# Patient Record
Sex: FEMALE | Race: OTHER | HISPANIC OR LATINO | URBAN - METROPOLITAN AREA
[De-identification: names, ages, dates, MRNs, and addresses within clinical notes are randomized per-mention and may not be internally consistent; named-entity substitution may affect disease eponyms.]

---

## 2017-01-15 ENCOUNTER — EMERGENCY (EMERGENCY)
Facility: HOSPITAL | Age: 37
LOS: 1 days | Discharge: HOME | End: 2017-01-15
Attending: EMERGENCY MEDICINE
Payer: MEDICAID

## 2017-01-15 VITALS
RESPIRATION RATE: 16 BRPM | SYSTOLIC BLOOD PRESSURE: 121 MMHG | DIASTOLIC BLOOD PRESSURE: 80 MMHG | TEMPERATURE: 98 F | OXYGEN SATURATION: 100 % | HEART RATE: 83 BPM

## 2017-01-15 VITALS
OXYGEN SATURATION: 97 % | WEIGHT: 139.99 LBS | SYSTOLIC BLOOD PRESSURE: 109 MMHG | RESPIRATION RATE: 18 BRPM | TEMPERATURE: 98 F | DIASTOLIC BLOOD PRESSURE: 69 MMHG | HEART RATE: 84 BPM | HEIGHT: 64 IN

## 2017-01-15 DIAGNOSIS — R42 DIZZINESS AND GIDDINESS: ICD-10-CM

## 2017-01-15 DIAGNOSIS — J45.909 UNSPECIFIED ASTHMA, UNCOMPLICATED: ICD-10-CM

## 2017-01-15 DIAGNOSIS — R51 HEADACHE: ICD-10-CM

## 2017-01-15 DIAGNOSIS — R10.13 EPIGASTRIC PAIN: ICD-10-CM

## 2017-01-15 DIAGNOSIS — I10 ESSENTIAL (PRIMARY) HYPERTENSION: ICD-10-CM

## 2017-01-15 DIAGNOSIS — Z88.0 ALLERGY STATUS TO PENICILLIN: ICD-10-CM

## 2017-01-15 DIAGNOSIS — R11.0 NAUSEA: ICD-10-CM

## 2017-01-15 LAB
ALBUMIN SERPL ELPH-MCNC: 4.4 G/DL — SIGNIFICANT CHANGE UP (ref 3.3–5.2)
ALP SERPL-CCNC: 48 U/L — SIGNIFICANT CHANGE UP (ref 40–120)
ALT FLD-CCNC: 22 U/L — SIGNIFICANT CHANGE UP
ANION GAP SERPL CALC-SCNC: 16 MMOL/L — SIGNIFICANT CHANGE UP (ref 5–17)
AST SERPL-CCNC: 25 U/L — SIGNIFICANT CHANGE UP
BASOPHILS # BLD AUTO: 0.1 K/UL — SIGNIFICANT CHANGE UP (ref 0–0.2)
BASOPHILS NFR BLD AUTO: 0.7 % — SIGNIFICANT CHANGE UP (ref 0–2)
BILIRUB SERPL-MCNC: 0.4 MG/DL — SIGNIFICANT CHANGE UP (ref 0.4–2)
BUN SERPL-MCNC: 10 MG/DL — SIGNIFICANT CHANGE UP (ref 8–20)
CALCIUM SERPL-MCNC: 9.6 MG/DL — SIGNIFICANT CHANGE UP (ref 8.6–10.2)
CHLORIDE SERPL-SCNC: 98 MMOL/L — SIGNIFICANT CHANGE UP (ref 98–107)
CO2 SERPL-SCNC: 26 MMOL/L — SIGNIFICANT CHANGE UP (ref 22–29)
CREAT SERPL-MCNC: 0.6 MG/DL — SIGNIFICANT CHANGE UP (ref 0.5–1.3)
EOSINOPHIL # BLD AUTO: 0.2 K/UL — SIGNIFICANT CHANGE UP (ref 0–0.5)
EOSINOPHIL NFR BLD AUTO: 1.9 % — SIGNIFICANT CHANGE UP (ref 0–6)
GLUCOSE SERPL-MCNC: 106 MG/DL — SIGNIFICANT CHANGE UP (ref 70–115)
HCT VFR BLD CALC: 41.6 % — SIGNIFICANT CHANGE UP (ref 37–47)
HGB BLD-MCNC: 14.8 G/DL — SIGNIFICANT CHANGE UP (ref 12–16)
LIDOCAIN IGE QN: 38 U/L — SIGNIFICANT CHANGE UP (ref 22–51)
LYMPHOCYTES # BLD AUTO: 2.9 K/UL — SIGNIFICANT CHANGE UP (ref 1–4.8)
LYMPHOCYTES # BLD AUTO: 32.1 % — SIGNIFICANT CHANGE UP (ref 20–55)
MCHC RBC-ENTMCNC: 31.4 PG — HIGH (ref 27–31)
MCHC RBC-ENTMCNC: 35.6 G/DL — SIGNIFICANT CHANGE UP (ref 32–36)
MCV RBC AUTO: 88.3 FL — SIGNIFICANT CHANGE UP (ref 81–99)
MONOCYTES # BLD AUTO: 0.5 K/UL — SIGNIFICANT CHANGE UP (ref 0–0.8)
MONOCYTES NFR BLD AUTO: 6.1 % — SIGNIFICANT CHANGE UP (ref 3–10)
NEUTROPHILS # BLD AUTO: 5.3 K/UL — SIGNIFICANT CHANGE UP (ref 1.8–8)
NEUTROPHILS NFR BLD AUTO: 59.1 % — SIGNIFICANT CHANGE UP (ref 37–73)
PLATELET # BLD AUTO: 282 K/UL — SIGNIFICANT CHANGE UP (ref 150–400)
POTASSIUM SERPL-MCNC: 3.6 MMOL/L — SIGNIFICANT CHANGE UP (ref 3.5–5.3)
POTASSIUM SERPL-SCNC: 3.6 MMOL/L — SIGNIFICANT CHANGE UP (ref 3.5–5.3)
PROT SERPL-MCNC: 8.3 G/DL — SIGNIFICANT CHANGE UP (ref 6.6–8.7)
RBC # BLD: 4.71 M/UL — SIGNIFICANT CHANGE UP (ref 4.4–5.2)
RBC # FLD: 12.2 % — SIGNIFICANT CHANGE UP (ref 11–15.6)
SODIUM SERPL-SCNC: 140 MMOL/L — SIGNIFICANT CHANGE UP (ref 135–145)
WBC # BLD: 8.9 K/UL — SIGNIFICANT CHANGE UP (ref 4.8–10.8)
WBC # FLD AUTO: 8.9 K/UL — SIGNIFICANT CHANGE UP (ref 4.8–10.8)

## 2017-01-15 PROCEDURE — 99284 EMERGENCY DEPT VISIT MOD MDM: CPT | Mod: 25

## 2017-01-15 PROCEDURE — 96374 THER/PROPH/DIAG INJ IV PUSH: CPT

## 2017-01-15 PROCEDURE — 85027 COMPLETE CBC AUTOMATED: CPT

## 2017-01-15 PROCEDURE — 83690 ASSAY OF LIPASE: CPT

## 2017-01-15 PROCEDURE — 84702 CHORIONIC GONADOTROPIN TEST: CPT

## 2017-01-15 PROCEDURE — 80053 COMPREHEN METABOLIC PANEL: CPT

## 2017-01-15 RX ORDER — IBUPROFEN 200 MG
600 TABLET ORAL ONCE
Qty: 0 | Refills: 0 | Status: COMPLETED | OUTPATIENT
Start: 2017-01-15 | End: 2017-01-15

## 2017-01-15 RX ORDER — ONDANSETRON 8 MG/1
4 TABLET, FILM COATED ORAL ONCE
Qty: 0 | Refills: 0 | Status: COMPLETED | OUTPATIENT
Start: 2017-01-15 | End: 2017-01-15

## 2017-01-15 RX ORDER — SODIUM CHLORIDE 9 MG/ML
1000 INJECTION INTRAMUSCULAR; INTRAVENOUS; SUBCUTANEOUS ONCE
Qty: 0 | Refills: 0 | Status: COMPLETED | OUTPATIENT
Start: 2017-01-15 | End: 2017-01-15

## 2017-01-15 RX ADMIN — Medication 600 MILLIGRAM(S): at 07:51

## 2017-01-15 RX ADMIN — SODIUM CHLORIDE 1000 MILLILITER(S): 9 INJECTION INTRAMUSCULAR; INTRAVENOUS; SUBCUTANEOUS at 07:49

## 2017-01-15 RX ADMIN — ONDANSETRON 4 MILLIGRAM(S): 8 TABLET, FILM COATED ORAL at 07:49

## 2017-01-15 RX ADMIN — Medication 600 MILLIGRAM(S): at 08:12

## 2017-01-15 NOTE — ED ADULT TRIAGE NOTE - CHIEF COMPLAINT QUOTE
pt states headache 3 hours pta , pt was at daughter bedside who is a patient while obtaining iv access on pt daughter pt states dizziness and nausea present pt assisted to stretcher

## 2017-01-15 NOTE — ED PROVIDER NOTE - OBJECTIVE STATEMENT
Pt is a 36yoF with h/o HTN, here in ED accompnaying 7yo daughter who likely has gastroenteritis.  She became lighthheaded, dizzy, and developed a generalized headache while watching the iv be placed in her daughter.  c/o some epigastric discomfort and persistent symptoms. No blurry vision/ numbness/tingling/chest pain/ SOB/ vomiting, or diarrhea. Denies foreign travel.

## 2017-01-15 NOTE — ED ADULT NURSE NOTE - OBJECTIVE STATEMENT
pt AOX3 started to feel sick while her daughter was being treated, c/o nausea, abdominal pain and headache. Denies any fevers, vomiting, diarrhea, constipation and blood in her stool. Pt has a Hx of HTN and asthma. Lungs clear bilaterally and normoactive bowel sounds. Blood sent to labs and medications given as per MD orders.

## 2017-01-15 NOTE — ED PROVIDER NOTE - MEDICAL DECISION MAKING DETAILS
Headache/ dizziness/lighteadedness while watching daughter's blood draw - developing epigastric pain/ nausea.  Ddx includes vasovagal reaction vs. early developing viral illness. Will check labs/ hydrate/ treat headache

## 2017-08-16 NOTE — ED ADULT NURSE NOTE - NS ED NURSE DC INFO COMPLEXITY
MD reviewed discharge instructions with the patient. The patient verbalized understanding. Patient removed her IV and took herself off the monitor. Patient was dressed when RN entered room. Patient is free of nausea and vomiting. Patient is in no apparent distress. Patient ambulated out of the emergency department without assistance.
Simple: Patient demonstrates quick and easy understanding/Verbalized Understanding

## 2017-09-06 ENCOUNTER — EMERGENCY (EMERGENCY)
Facility: HOSPITAL | Age: 37
LOS: 1 days | Discharge: HOME | End: 2017-09-06
Attending: EMERGENCY MEDICINE
Payer: MEDICAID

## 2017-09-06 VITALS
SYSTOLIC BLOOD PRESSURE: 128 MMHG | DIASTOLIC BLOOD PRESSURE: 86 MMHG | OXYGEN SATURATION: 100 % | HEART RATE: 63 BPM | HEIGHT: 65 IN | TEMPERATURE: 98 F | WEIGHT: 139.99 LBS | RESPIRATION RATE: 18 BRPM

## 2017-09-06 PROCEDURE — 99284 EMERGENCY DEPT VISIT MOD MDM: CPT

## 2017-09-06 PROCEDURE — 93005 ELECTROCARDIOGRAM TRACING: CPT

## 2017-09-06 PROCEDURE — 99283 EMERGENCY DEPT VISIT LOW MDM: CPT | Mod: 25

## 2017-09-06 PROCEDURE — 93010 ELECTROCARDIOGRAM REPORT: CPT

## 2017-09-06 RX ORDER — IBUPROFEN 200 MG
1 TABLET ORAL
Qty: 24 | Refills: 0
Start: 2017-09-06

## 2017-09-06 RX ORDER — FLUTICASONE PROPIONATE 50 MCG
1 SPRAY, SUSPENSION NASAL
Qty: 1 | Refills: 0
Start: 2017-09-06 | End: 2017-10-06

## 2017-09-06 NOTE — ED STATDOCS - MEDICAL DECISION MAKING DETAILS
Will check EKG and control pain. Sx consistent with URI. Non-tender sinuses, no pharyngeal erythema, lungs clear, no clinical OM. Likely URI, advised motrin, flonase, and dc. No fevers, well appearing, stable for dc

## 2017-09-06 NOTE — ED STATDOCS - OBJECTIVE STATEMENT
38 y/o F w/ PMHx of HTN presents to ED c/o b/l ear pain and headache x3 days. Associated sx include sore throat, and nasal congestion. Pt states she made an appointment to visit PMD but could not see him for another 2 weeks. She notes intermittent CP on the R side. Pt states she has not taken any medication for her current sx. Denies recent travel, sick contacts, difficulty breathing, SOB, cough, or fever. 36 y/o F w/ PMHx of HTN presents to ED c/o b/l ear pain and headache x3 days. Associated sx include sore throat, and nasal congestion. Pt states she made an appointment to visit PMD but could not see him for another 2 weeks. Pt states she has not taken any medication for her current sx. Denies recent travel, sick contacts, difficulty breathing, SOB, cough, or fever.

## 2017-10-31 ENCOUNTER — INPATIENT (INPATIENT)
Facility: HOSPITAL | Age: 37
LOS: 5 days | Discharge: HOME | DRG: 581 | End: 2017-11-06
Attending: HOSPITALIST
Payer: MEDICAID

## 2017-10-31 VITALS — HEIGHT: 65 IN | WEIGHT: 139.99 LBS

## 2017-10-31 DIAGNOSIS — R59.9 ENLARGED LYMPH NODES, UNSPECIFIED: ICD-10-CM

## 2017-10-31 DIAGNOSIS — R59.1 GENERALIZED ENLARGED LYMPH NODES: ICD-10-CM

## 2017-10-31 DIAGNOSIS — J45.909 UNSPECIFIED ASTHMA, UNCOMPLICATED: ICD-10-CM

## 2017-10-31 DIAGNOSIS — I10 ESSENTIAL (PRIMARY) HYPERTENSION: ICD-10-CM

## 2017-10-31 LAB
ALBUMIN SERPL ELPH-MCNC: 4.3 G/DL — SIGNIFICANT CHANGE UP (ref 3.3–5.2)
ALP SERPL-CCNC: 62 U/L — SIGNIFICANT CHANGE UP (ref 40–120)
ALT FLD-CCNC: 15 U/L — SIGNIFICANT CHANGE UP
ANION GAP SERPL CALC-SCNC: 14 MMOL/L — SIGNIFICANT CHANGE UP (ref 5–17)
APTT BLD: 28.2 SEC — SIGNIFICANT CHANGE UP (ref 27.5–37.4)
AST SERPL-CCNC: 17 U/L — SIGNIFICANT CHANGE UP
BASOPHILS # BLD AUTO: 0 K/UL — SIGNIFICANT CHANGE UP (ref 0–0.2)
BASOPHILS NFR BLD AUTO: 0.2 % — SIGNIFICANT CHANGE UP (ref 0–2)
BILIRUB SERPL-MCNC: 0.5 MG/DL — SIGNIFICANT CHANGE UP (ref 0.4–2)
BUN SERPL-MCNC: 9 MG/DL — SIGNIFICANT CHANGE UP (ref 8–20)
CALCIUM SERPL-MCNC: 8.9 MG/DL — SIGNIFICANT CHANGE UP (ref 8.6–10.2)
CHLORIDE SERPL-SCNC: 99 MMOL/L — SIGNIFICANT CHANGE UP (ref 98–107)
CO2 SERPL-SCNC: 26 MMOL/L — SIGNIFICANT CHANGE UP (ref 22–29)
CREAT SERPL-MCNC: 0.56 MG/DL — SIGNIFICANT CHANGE UP (ref 0.5–1.3)
EOSINOPHIL # BLD AUTO: 0.2 K/UL — SIGNIFICANT CHANGE UP (ref 0–0.5)
EOSINOPHIL NFR BLD AUTO: 1.3 % — SIGNIFICANT CHANGE UP (ref 0–6)
GLUCOSE SERPL-MCNC: 84 MG/DL — SIGNIFICANT CHANGE UP (ref 70–115)
HCG SERPL-ACNC: <2 MIU/ML — SIGNIFICANT CHANGE UP
HCT VFR BLD CALC: 38.3 % — SIGNIFICANT CHANGE UP (ref 37–47)
HGB BLD-MCNC: 13.6 G/DL — SIGNIFICANT CHANGE UP (ref 12–16)
INR BLD: 1.01 RATIO — SIGNIFICANT CHANGE UP (ref 0.88–1.16)
LYMPHOCYTES # BLD AUTO: 2.9 K/UL — SIGNIFICANT CHANGE UP (ref 1–4.8)
LYMPHOCYTES # BLD AUTO: 20 % — SIGNIFICANT CHANGE UP (ref 20–55)
MCHC RBC-ENTMCNC: 31.6 PG — HIGH (ref 27–31)
MCHC RBC-ENTMCNC: 35.5 G/DL — SIGNIFICANT CHANGE UP (ref 32–36)
MCV RBC AUTO: 89.1 FL — SIGNIFICANT CHANGE UP (ref 81–99)
MONOCYTES # BLD AUTO: 0.9 K/UL — HIGH (ref 0–0.8)
MONOCYTES NFR BLD AUTO: 6.1 % — SIGNIFICANT CHANGE UP (ref 3–10)
NEUTROPHILS # BLD AUTO: 10.3 K/UL — HIGH (ref 1.8–8)
NEUTROPHILS NFR BLD AUTO: 72.2 % — SIGNIFICANT CHANGE UP (ref 37–73)
PLATELET # BLD AUTO: 256 K/UL — SIGNIFICANT CHANGE UP (ref 150–400)
POTASSIUM SERPL-MCNC: 3.4 MMOL/L — LOW (ref 3.5–5.3)
POTASSIUM SERPL-SCNC: 3.4 MMOL/L — LOW (ref 3.5–5.3)
PROT SERPL-MCNC: 8 G/DL — SIGNIFICANT CHANGE UP (ref 6.6–8.7)
PROTHROM AB SERPL-ACNC: 11.1 SEC — SIGNIFICANT CHANGE UP (ref 9.8–12.7)
RBC # BLD: 4.3 M/UL — LOW (ref 4.4–5.2)
RBC # FLD: 13.9 % — SIGNIFICANT CHANGE UP (ref 11–15.6)
SODIUM SERPL-SCNC: 139 MMOL/L — SIGNIFICANT CHANGE UP (ref 135–145)
WBC # BLD: 14.3 K/UL — HIGH (ref 4.8–10.8)
WBC # FLD AUTO: 14.3 K/UL — HIGH (ref 4.8–10.8)

## 2017-10-31 PROCEDURE — 71260 CT THORAX DX C+: CPT | Mod: 26

## 2017-10-31 PROCEDURE — 99283 EMERGENCY DEPT VISIT LOW MDM: CPT

## 2017-10-31 RX ORDER — FLUTICASONE PROPIONATE 50 MCG
1 SPRAY, SUSPENSION NASAL
Qty: 0 | Refills: 0 | Status: DISCONTINUED | OUTPATIENT
Start: 2017-10-31 | End: 2017-11-06

## 2017-10-31 RX ORDER — POTASSIUM CHLORIDE 20 MEQ
40 PACKET (EA) ORAL ONCE
Qty: 0 | Refills: 0 | Status: COMPLETED | OUTPATIENT
Start: 2017-10-31 | End: 2017-10-31

## 2017-10-31 RX ORDER — LABETALOL HCL 100 MG
200 TABLET ORAL EVERY 12 HOURS
Qty: 0 | Refills: 0 | Status: DISCONTINUED | OUTPATIENT
Start: 2017-10-31 | End: 2017-11-01

## 2017-10-31 RX ORDER — ALBUTEROL 90 UG/1
1 AEROSOL, METERED ORAL EVERY 4 HOURS
Qty: 0 | Refills: 0 | Status: DISCONTINUED | OUTPATIENT
Start: 2017-10-31 | End: 2017-11-06

## 2017-10-31 RX ORDER — ACETAMINOPHEN 500 MG
650 TABLET ORAL EVERY 6 HOURS
Qty: 0 | Refills: 0 | Status: DISCONTINUED | OUTPATIENT
Start: 2017-10-31 | End: 2017-11-06

## 2017-10-31 RX ORDER — OXYCODONE AND ACETAMINOPHEN 5; 325 MG/1; MG/1
1 TABLET ORAL EVERY 6 HOURS
Qty: 0 | Refills: 0 | Status: DISCONTINUED | OUTPATIENT
Start: 2017-10-31 | End: 2017-11-06

## 2017-10-31 RX ORDER — KETOROLAC TROMETHAMINE 30 MG/ML
30 SYRINGE (ML) INJECTION ONCE
Qty: 0 | Refills: 0 | Status: DISCONTINUED | OUTPATIENT
Start: 2017-10-31 | End: 2017-10-31

## 2017-10-31 RX ORDER — LISINOPRIL 2.5 MG/1
20 TABLET ORAL DAILY
Qty: 0 | Refills: 0 | Status: DISCONTINUED | OUTPATIENT
Start: 2017-10-31 | End: 2017-11-01

## 2017-10-31 RX ORDER — MORPHINE SULFATE 50 MG/1
4 CAPSULE, EXTENDED RELEASE ORAL ONCE
Qty: 0 | Refills: 0 | Status: DISCONTINUED | OUTPATIENT
Start: 2017-10-31 | End: 2017-10-31

## 2017-10-31 RX ADMIN — Medication 650 MILLIGRAM(S): at 21:56

## 2017-10-31 RX ADMIN — LISINOPRIL 20 MILLIGRAM(S): 2.5 TABLET ORAL at 21:08

## 2017-10-31 RX ADMIN — Medication 650 MILLIGRAM(S): at 21:08

## 2017-10-31 RX ADMIN — MORPHINE SULFATE 4 MILLIGRAM(S): 50 CAPSULE, EXTENDED RELEASE ORAL at 15:50

## 2017-10-31 RX ADMIN — Medication 40 MILLIEQUIVALENT(S): at 20:49

## 2017-10-31 RX ADMIN — Medication 30 MILLIGRAM(S): at 17:32

## 2017-10-31 RX ADMIN — Medication 30 MILLIGRAM(S): at 17:31

## 2017-10-31 RX ADMIN — Medication 1 SPRAY(S): at 21:07

## 2017-10-31 RX ADMIN — Medication 200 MILLIGRAM(S): at 21:08

## 2017-10-31 RX ADMIN — MORPHINE SULFATE 4 MILLIGRAM(S): 50 CAPSULE, EXTENDED RELEASE ORAL at 16:30

## 2017-10-31 NOTE — H&P ADULT - RS GEN PE MLT RESP DETAILS PC
airway patent/no rales/good air movement/no wheezes/no rhonchi/breath sounds equal/respirations non-labored

## 2017-10-31 NOTE — ED ADULT NURSE REASSESSMENT NOTE - NS ED NURSE REASSESS COMMENT FT1
Called unit to give report. WEN Orozco not available for report at this time. Will await return phone call.
Called unit to give report. WEN Orozco not available. Will call unit again.   Patient remains A&Ox4, complaining of pain to right midaxillary & nausea. Medication for pain administered as ordered. All PM medications administered as ordered, well tolerated. Respirations even & unlabored, denies any numbness or tingling. Denies any chest pain, shortness of breath, or dizziness. IV ABX in progress, well tolerated. IV site C/D/I, patent, negative s/s phlebitis or infiltration. Will monitor.
Report received from off going RN, charting as noted. Patient A&Ox4, tearful, complaining of pain to right axillary secondary to abscess. Respirations even & unlabored, denies any numbness or tingling. Denies any chest pain, shortness of breath, nausea or dizziness. IV site C/D/I, patent, negative s/s phlebitis or infiltration. Plan of care discussed, all questions answered. Will monitor.

## 2017-10-31 NOTE — ED STATDOCS - LACERATION LENGTH DESCRIPTION FT
5x3 circular lesions that is hard, firm and tender to right axilla. No fluctuance, no erythema, and no warmth.

## 2017-10-31 NOTE — ED ADULT NURSE NOTE - OBJECTIVE STATEMENT
rt axillary abscess. started 3 months ago, but pain is worse + fever yesterday, denies any drainage.

## 2017-10-31 NOTE — ED STATDOCS - ATTENDING CONTRIBUTION TO CARE
I, Juan Luis Hamilton, performed the initial face to face bedside interview with this patient regarding history of present illness, review of symptoms and relevant past medical, social and family history.  I completed an independent physical examination.  I was the initial provider who evaluated this patient. I have signed out the follow up of any pending tests (i.e. labs, radiological studies) to the ACP.  I have communicated the patient’s plan of care and disposition with the ACP.  The history, relevant review of systems, past medical and surgical history, medical decision making, and physical examination was documented by the scribe in my presence and I attest to the accuracy of the documentation.

## 2017-10-31 NOTE — ED STATDOCS - OBJECTIVE STATEMENT
37 year old female presenting to the ED complaining of a bump to her right axilla x 3 months. Pt states that she did not feel pain to the area until yesterday. She states that her sx began as a small bump but began to grow in size. Pt states that she visited her PMD for her sx and was informed that she needed to have the abscess removed. She states that she did not follow up with her MD. Pt denies having any breast pain or discharge. She denies having any trauma to the area.  No further complaints at this time.  : Rosanna

## 2017-10-31 NOTE — H&P ADULT - HISTORY OF PRESENT ILLNESS
37 years old female with PMH of HTN presented to the ER for the evaluation of right axillary mass. Patient states the mass has been there for >6 months but for the past few days its has become pain full. Pain is constant, 10/10, non radiating and not associated with nausea, vomiting, SOB, chest pain .

## 2017-10-31 NOTE — H&P ADULT - PROBLEM SELECTOR PLAN 1
?etiology.  CT chest reviewed.  elevated wbc count.  started on Levaquin.  ID consult.  consider hematology consult if no improvement.

## 2017-10-31 NOTE — ED STATDOCS - PROGRESS NOTE DETAILS
Pt seen and evaluated. Agree with HPI/PE and plan. will follow CT and reeval Pt seen and evaluated. Agree with HPI/PE and plan. + large tender nodular axillar mass. No erythema or warmth to suggest abscess. Pt with + fh of breast CA. B/L breast small nontender nodules c/w possible fibrocystic breast vs CA.   Will follow CT and reeval Pt seen and evaluated. Agree with HPI/PE and plan. + large tender nodular axillar mass. Pt reports axillary nodule x 6 months, but develop and increase pain and swelling x 1 month. No fevers, but reports chills. No N/V.  No erythema or warmth to suggest abscess.   Will follow CT and reeval Results noted- concerning for possible lymphoma. results d/w pt and hospitalist called for admission

## 2017-11-01 DIAGNOSIS — J45.909 UNSPECIFIED ASTHMA, UNCOMPLICATED: ICD-10-CM

## 2017-11-01 DIAGNOSIS — R22.31 LOCALIZED SWELLING, MASS AND LUMP, RIGHT UPPER LIMB: ICD-10-CM

## 2017-11-01 DIAGNOSIS — R59.0 LOCALIZED ENLARGED LYMPH NODES: ICD-10-CM

## 2017-11-01 DIAGNOSIS — R50.9 FEVER, UNSPECIFIED: ICD-10-CM

## 2017-11-01 DIAGNOSIS — N63.10 UNSPECIFIED LUMP IN THE RIGHT BREAST, UNSPECIFIED QUADRANT: ICD-10-CM

## 2017-11-01 LAB
POTASSIUM SERPL-MCNC: 3.9 MMOL/L — SIGNIFICANT CHANGE UP (ref 3.5–5.3)
POTASSIUM SERPL-SCNC: 3.9 MMOL/L — SIGNIFICANT CHANGE UP (ref 3.5–5.3)

## 2017-11-01 PROCEDURE — 99222 1ST HOSP IP/OBS MODERATE 55: CPT

## 2017-11-01 PROCEDURE — 99233 SBSQ HOSP IP/OBS HIGH 50: CPT

## 2017-11-01 RX ORDER — SODIUM CHLORIDE 9 MG/ML
1000 INJECTION INTRAMUSCULAR; INTRAVENOUS; SUBCUTANEOUS
Qty: 0 | Refills: 0 | Status: DISCONTINUED | OUTPATIENT
Start: 2017-11-01 | End: 2017-11-03

## 2017-11-01 RX ORDER — SODIUM CHLORIDE 9 MG/ML
1000 INJECTION INTRAMUSCULAR; INTRAVENOUS; SUBCUTANEOUS ONCE
Qty: 0 | Refills: 0 | Status: COMPLETED | OUTPATIENT
Start: 2017-11-01 | End: 2017-11-01

## 2017-11-01 RX ORDER — AZITHROMYCIN 500 MG/1
500 TABLET, FILM COATED ORAL DAILY
Qty: 0 | Refills: 0 | Status: DISCONTINUED | OUTPATIENT
Start: 2017-11-01 | End: 2017-11-06

## 2017-11-01 RX ORDER — ONDANSETRON 8 MG/1
4 TABLET, FILM COATED ORAL EVERY 6 HOURS
Qty: 0 | Refills: 0 | Status: DISCONTINUED | OUTPATIENT
Start: 2017-11-01 | End: 2017-11-06

## 2017-11-01 RX ORDER — LABETALOL HCL 100 MG
100 TABLET ORAL EVERY 12 HOURS
Qty: 0 | Refills: 0 | Status: DISCONTINUED | OUTPATIENT
Start: 2017-11-01 | End: 2017-11-06

## 2017-11-01 RX ORDER — INFLUENZA VIRUS VACCINE 15; 15; 15; 15 UG/.5ML; UG/.5ML; UG/.5ML; UG/.5ML
0.5 SUSPENSION INTRAMUSCULAR ONCE
Qty: 0 | Refills: 0 | Status: COMPLETED | OUTPATIENT
Start: 2017-11-01 | End: 2017-11-01

## 2017-11-01 RX ORDER — SODIUM CHLORIDE 9 MG/ML
500 INJECTION INTRAMUSCULAR; INTRAVENOUS; SUBCUTANEOUS ONCE
Qty: 0 | Refills: 0 | Status: COMPLETED | OUTPATIENT
Start: 2017-11-01 | End: 2017-11-01

## 2017-11-01 RX ADMIN — OXYCODONE AND ACETAMINOPHEN 1 TABLET(S): 5; 325 TABLET ORAL at 20:50

## 2017-11-01 RX ADMIN — SODIUM CHLORIDE 1000 MILLILITER(S): 9 INJECTION INTRAMUSCULAR; INTRAVENOUS; SUBCUTANEOUS at 08:23

## 2017-11-01 RX ADMIN — SODIUM CHLORIDE 125 MILLILITER(S): 9 INJECTION INTRAMUSCULAR; INTRAVENOUS; SUBCUTANEOUS at 19:54

## 2017-11-01 RX ADMIN — SODIUM CHLORIDE 125 MILLILITER(S): 9 INJECTION INTRAMUSCULAR; INTRAVENOUS; SUBCUTANEOUS at 15:26

## 2017-11-01 RX ADMIN — Medication 1 SPRAY(S): at 17:40

## 2017-11-01 RX ADMIN — AZITHROMYCIN 500 MILLIGRAM(S): 500 TABLET, FILM COATED ORAL at 11:52

## 2017-11-01 RX ADMIN — OXYCODONE AND ACETAMINOPHEN 1 TABLET(S): 5; 325 TABLET ORAL at 06:14

## 2017-11-01 RX ADMIN — ONDANSETRON 4 MILLIGRAM(S): 8 TABLET, FILM COATED ORAL at 08:27

## 2017-11-01 RX ADMIN — OXYCODONE AND ACETAMINOPHEN 1 TABLET(S): 5; 325 TABLET ORAL at 14:00

## 2017-11-01 RX ADMIN — OXYCODONE AND ACETAMINOPHEN 1 TABLET(S): 5; 325 TABLET ORAL at 19:54

## 2017-11-01 RX ADMIN — ONDANSETRON 4 MILLIGRAM(S): 8 TABLET, FILM COATED ORAL at 15:26

## 2017-11-01 RX ADMIN — SODIUM CHLORIDE 2000 MILLILITER(S): 9 INJECTION INTRAMUSCULAR; INTRAVENOUS; SUBCUTANEOUS at 23:54

## 2017-11-01 RX ADMIN — OXYCODONE AND ACETAMINOPHEN 1 TABLET(S): 5; 325 TABLET ORAL at 06:51

## 2017-11-01 RX ADMIN — OXYCODONE AND ACETAMINOPHEN 1 TABLET(S): 5; 325 TABLET ORAL at 13:03

## 2017-11-01 RX ADMIN — Medication 1 SPRAY(S): at 06:14

## 2017-11-01 NOTE — CONSULT NOTE ADULT - ASSESSMENT
PAINFUL AXILLARY LYMPHADENOPATHY - NO EPID FOR BARTONELLA  NO TRAVEL AGAINST HISTO    C/W SIMILAR MASS IN BREAST  NODE APPEARS LARGER THAN STATED IN CT    SUGGEST BREAST SURGEON AND IR FOR BX AND CULURE/PATH    EMPIRIC AZITHRO PENDING BARTONELLA SEROLOGY

## 2017-11-01 NOTE — PROGRESS NOTE ADULT - SUBJECTIVE AND OBJECTIVE BOX
Patient is a 37y old  Female who presents with a chief complaint of axillary swelling and pain ,had pain for 1 week now, swelling noted few months ago , denies fever chills , discussed with ID Dr Gill       Allergies    penicillin (Rash)    Intolerances  ROS : pain and swelling under right arm, chest otherwise negative       REVIEW OF SYSTEMS:    Vital Signs Last 24 Hrs  T(C): 36.9 (01 Nov 2017 07:30), Max: 36.9 (31 Oct 2017 13:39)  T(F): 98.4 (01 Nov 2017 07:30), Max: 98.5 (31 Oct 2017 13:39)  HR: 60 (01 Nov 2017 11:18) (60 - 84)  BP: 90/58 (01 Nov 2017 11:18) (80/50 - 128/84)  BP(mean): 97 (31 Oct 2017 19:51) (97 - 97)  RR: 18 (01 Nov 2017 07:30) (16 - 19)  SpO2: 97% (01 Nov 2017 07:30) (97% - 100%)    PHYSICAL EXAM:    GENERAL: NAD, well-groomed, well-developed  NECK: Supple, No JVD, Normal thyroid  CHEST/LUNG: Clear to auscultation percussion bilaterally; No rales, rhonchi, wheezing, or rubs  HEART: Regular rate and rhythm; No murmurs, rubs, or gallops  ABDOMEN: Soft, Nontender, Nondistended; Bowel sounds present  EXTREMITIES:  2+ Peripheral Pulses, No clubbing, cyanosis, or edema  SKIN : right axillary /chest skin lump tender to touch       LABS:                        13.6   14.3  )-----------( 256      ( 31 Oct 2017 16:26 )             38.3     11-01    x   |  x   |  x   ----------------------------<  x   3.9   |  x   |  x     Ca    8.9      31 Oct 2017 16:26    TPro  8.0  /  Alb  4.3  /  TBili  0.5  /  DBili  x   /  AST  17  /  ALT  15  /  AlkPhos  62  10-31    PT/INR - ( 31 Oct 2017 16:26 )   PT: 11.1 sec;   INR: 1.01 ratio         PTT - ( 31 Oct 2017 16:26 )  PTT:28.2 sec  < from: CT Chest w/ IV Cont (10.31.17 @ 18:20) >  IMPRESSION:      The palpable abnormality in the superior lateral right breast at the   inferior margin of the right axilla appears to represent a solid nodule,   possibly a lymph node. In addition there is a mildly enlarged lymph node   in the superior aspect of the right axilla. Mild lymphadenopathy noted in   the visualized high retroperitoneum..    < end of copied text >      RADIOLOGY & ADDITIONAL TESTS:

## 2017-11-01 NOTE — PROGRESS NOTE ADULT - SUBJECTIVE AND OBJECTIVE BOX
The patient is a 37y old female who presents with a complaint of a painful worrisome axillary   nodule that is getting bigger.  She is scheduled to have a RIGHT AXILLARY NODE BIOPSY.  (31 Oct 2017 19:51)      PAST MEDICAL HISTORY:  Hypertension      PAST SURGICAL HISTORY:  Cesarian section x 2  Tummy tuck 3 years ago.      MEDICATIONS  (STANDING):  azithromycin   Tablet 500 milliGRAM(s) Oral daily  fluticasone propionate 50 MICROgram(s)/spray Nasal Spray 1 Spray(s) Both Nostrils two times a day  influenza   Vaccine 0.5 milliLiter(s) IntraMuscular once  labetalol 100 milliGRAM(s) Oral every 12 hours  sodium chloride 0.9%. 1000 milliLiter(s) (125 mL/Hr) IV Continuous <Continuous>    MEDICATIONS  (PRN):  acetaminophen   Tablet. 650 milliGRAM(s) Oral every 6 hours PRN Mild Pain (1 - 3)  ALBUTerol    90 MICROgram(s) HFA Inhaler 1 Puff(s) Inhalation every 4 hours PRN Shortness of Breath and/or Wheezing  ondansetron Injectable 4 milliGRAM(s) IV Push every 6 hours PRN Nausea  oxyCODONE    5 mG/acetaminophen 325 mG 1 Tablet(s) Oral every 6 hours PRN Moderate Pain (4 - 6)    Allergies:    penicillin (nausea & vomiting)    SOCIAL HISTORY:    The patient drinks wine on rare occasion.  She doesn't smoke or use illicit                               drugs.                      13.6   14.3  )-----------( 256      ( 31 Oct 2017 16:26 )             38.3       PT/INR - ( 31 Oct 2017 16:26 )   PT: 11.1 sec;   INR: 1.01 ratio         PTT - ( 31 Oct 2017 16:26 )  PTT:28.2 sec    11-01    x   |  x   |  x   ----------------------------<  x   3.9   |  x   |  x     Ca    8.9      31 Oct 2017 16:26    TPro  8.0  /  Alb  4.3  /  TBili  0.5  /  DBili  x   /  AST  17  /  ALT  15  /  AlkPhos  62  10-31    [id number]  Normal sinus rhythm  Normal ECG    CT CHEST  -  10/31/2017  FINDINGS:  Mildly enlarged lymph node in the superior aspect of the right exit   liver. At the superior margin of the lateral aspect of the right breast,   just inferior to the right axilla, there is a soft tissue nodule. This   abnormality measures 3.0 x 3.0 cm. This has the appearance of a solid   nodule. No evidence of a discernible wall or fluid center.  Mild lymphadenopathy is noted in the left para-aortic region of the upper   abdomen.  IMPRESSION:      The palpable abnormality in the superior lateral right breast at the   inferior margin of the right axilla appears to represent a solid nodule,   possibly a lymph node. In addition there is a mildly enlarged lymph node   in the superior aspect of the right axilla. Mild lymphadenopathy noted in   the visualized high retroperitoneum..    ASA # = 1 Mallampati # = 3   (Her teeth are intact)

## 2017-11-01 NOTE — CONSULT NOTE ADULT - SUBJECTIVE AND OBJECTIVE BOX
38yo F admitted to the ED last night with cc tender mass on the R maxilla. Patient refers that she first noted this mass 6 months ago and states that it has been increasing in size over time. She complains of chills and states to have had a fever on Sun that was not measured.  Mass became red/ecchymotic and more tender on Sat, now improved. She refers pain is 10/10. Denies drainage from the mass, trauma, or insect bites. States to have travelled recently for 3 [place]. Has a (-) breast CA family hx. One aunt with hx of ovarian CA. Not currently breastfeeding.  Denies n/v, SOB, CP, recent weight loss or loss of appetite. (-)OCP    PMHx: Asthma, HTN  All: PCN  PSHx: c/s, abd liposuction 4 yrs ago in   Meds: ibuprofen, fluticasone, labetalol, lisinopril    CT A/P 10/31: Superior lateral R breast in inferior margin of R axilla- solid nodule possibly LN. Mildly enlarged LN in superior aspect of R axilla. Mild lymphadenopathy in the RP.    TM: 98.4  HR:60-84  BP: 80/50 - 128/72  O2:    AAOx3, NAD  CTAx2  Tender palpable 3x5cm mass in the R axilla- nonfluctuant. No drainage. inflamed surrounding tissue  Breast exam- no palpable mass bilaterally, +fibrocystic changes, no nipple drainage, no skin changes noted  (-)cervical, supraclavicular, ingunal nodules palpated  S/ND/NT, no rebound or guarding    Labs:  WBC: 14.3 (segs: 72.2%)  H/H: 13.6/38.3  BUN/Cr: 9/0.56    A/P 38 yo F with palpable LN in R axilla and R superior of breast, most likely infected LN. Patient seen and evaluated with Dr. Rodriguez.  -Excisional biopsy of LN in R axilla  -Will follow   -rest as per PMD and ID
[facility] [place] VANESA ORO MD FACP   NANCY ACOSTA MD  Diplomates American Board of Internal Medicine and Infecctious Diseases      MRN-[id number]  JIMENA ARTHUR is a 37y  Female     CC:  5 MONTHS SOLLEN LN R AXILLA  NO C/O INCREASED PAIN AND FEVER  AFEBRILE ON ADMIT    DENIES ANIMAL EXPOSURE(CATS0 OR RECENT TRAVEL OR ANY PETS  NOTED NODE FOR MONTHS  NOW PAIN AND ? FEVER  NO WEIGHT LOSS  POS SWEATS    Past Medical & Surgical Hx:  PAST MEDICAL & SURGICAL HISTORY:  Asthma  High blood pressure   delivery delivered      Problem List:  HEALTH ISSUES - PROBLEM Dx:  Asthma: Asthma  HTN (hypertension): HTN (hypertension)  Lymph nodes enlarged: Lymph nodes enlarged            Allergies    penicillin (Rash)    Intolerances          ANTIBIOTICS:   azithromycin   Tablet 500 milliGRAM(s) Oral daily       Review of Systems: - Negative except as mentioned below  FEVER  PAINFUL R AXILLA    Physical Exam:    Vital Signs Last 24 Hrs  T(C): 36.9 (2017 07:30), Max: 36.9 (31 Oct 2017 13:39)  T(F): 98.4 (2017 07:30), Max: 98.5 (31 Oct 2017 13:39)  HR: 60 (2017 07:30) (60 - 84)  BP: 90/57 (2017 07:30) (80/50 - 128/84)  BP(mean): 97 (31 Oct 2017 19:51) (97 - 97)  RR: 18 (2017 07:30) (16 - 19)  SpO2: 97% (2017 07:30) (97% - 100%)    Height (cm): 167.64 (10-31 @ 16:27)  Weight (kg): 81.6 (10-31 @ 16:27)  BMI (kg/m2): 29 (10-31 @ 16:27)  BSA (m2): 1.91 (10-31 @ 16:27)    GEN: NAD, pleasant  HEENT: normocephalic and atraumatic. EOMI. JORDAN. Moist mucosa. Clear Posterior pharynx.  NECK: Supple. No carotid bruits.  No lymphadenopathy or thyromegaly.SHOTTY SMALL NODES  LUNGS: Clear to auscultation.  HEART: Regular rate and rhythm without murmur.  ABDOMEN: Soft, nontender, and nondistended.  Positive bowel sounds.  No hepatosplenomegaly was noted.  EXTREMITIES: Without any cyanosis, clubbing, rash, lesions or edema. LARGE VERY TENDER NON FLUCTUANT MASS R AXILLA. TOO PAINFUL TO PALPATE AND DETERMINE SIZE. SOFT  NO LN ELSEWHERE - L AXILLA, SIGNIF NECK OR INGUINAL  NEUROLOGIC: Cranial nerves II through XII are grossly intact.  MUSCULOSKELETAL:  SKIN: No ulceration or induration present.      Labs:                        13.6   14.3  )-----------( 256      ( 31 Oct 2017 16:26 )             38.3         x   |  x   |  x   ----------------------------<  x   3.9   |  x   |  x     Ca    8.9      31 Oct 2017 16:26    TPro  8.0  /  Alb  4.3  /  TBili  0.5  /  DBili  x   /  AST  17  /  ALT  15  /  AlkPhos  62  10-31    PT/INR - ( 31 Oct 2017 16:26 )   PT: 11.1 sec;   INR: 1.01 ratio         PTT - ( 31 Oct 2017 16:26 )  PTT:28.2 sec      Hemoglobin: 13.6 g/dL (10-31 @ 16:26)  Hematocrit: 38.3 % (10-31 @ 16:26)  WBC Count: 14.3 K/uL (10-31 @ 16:26)  Platelet Count - Automated: 256 K/uL (10-31 @ 16:26)    Potassium, Serum: 3.9 mmol/L ( @ 08:11)  Potassium, Serum: 3.4 mmol/L <L> (10-31 @ 16:26)  Blood Urea Nitrogen, Serum: 9.0 mg/dL (10-31 @ 16:26)  Sodium, Serum: 139 mmol/L (10-31 @ 16:26)          MICROBIOLOGY        RADIOLOGY  < from: CT Chest w/ IV Cont (10.31.17 @ 18:20) >  IMPRESSION:      The palpable abnormality in the superior lateral right breast at the   inferior margin of the right axilla appears to represent a solid nodule,   possibly a lymph node. In addition there is a mildly enlarged lymph node   in the superior aspect of the right axilla. Mild lymphadenopathy noted in   the visualized high retroperitoneum..          < end of copied text >              MADDIE ORO MD FACP

## 2017-11-02 ENCOUNTER — RESULT REVIEW (OUTPATIENT)
Age: 37
End: 2017-11-02

## 2017-11-02 LAB
ANION GAP SERPL CALC-SCNC: 8 MMOL/L — SIGNIFICANT CHANGE UP (ref 5–17)
BASOPHILS # BLD AUTO: 0 K/UL — SIGNIFICANT CHANGE UP (ref 0–0.2)
BASOPHILS NFR BLD AUTO: 0.2 % — SIGNIFICANT CHANGE UP (ref 0–2)
BUN SERPL-MCNC: 9 MG/DL — SIGNIFICANT CHANGE UP (ref 8–20)
CALCIUM SERPL-MCNC: 7.4 MG/DL — LOW (ref 8.6–10.2)
CHLORIDE SERPL-SCNC: 110 MMOL/L — HIGH (ref 98–107)
CO2 SERPL-SCNC: 22 MMOL/L — SIGNIFICANT CHANGE UP (ref 22–29)
CREAT SERPL-MCNC: 0.66 MG/DL — SIGNIFICANT CHANGE UP (ref 0.5–1.3)
EOSINOPHIL # BLD AUTO: 0.2 K/UL — SIGNIFICANT CHANGE UP (ref 0–0.5)
EOSINOPHIL NFR BLD AUTO: 1.8 % — SIGNIFICANT CHANGE UP (ref 0–6)
GLUCOSE SERPL-MCNC: 90 MG/DL — SIGNIFICANT CHANGE UP (ref 70–115)
GRAM STN FLD: SIGNIFICANT CHANGE UP
HCT VFR BLD CALC: 31.7 % — LOW (ref 37–47)
HGB BLD-MCNC: 10.8 G/DL — LOW (ref 12–16)
LYMPHOCYTES # BLD AUTO: 3.2 K/UL — SIGNIFICANT CHANGE UP (ref 1–4.8)
LYMPHOCYTES # BLD AUTO: 32.7 % — SIGNIFICANT CHANGE UP (ref 20–55)
MAGNESIUM SERPL-MCNC: 1.9 MG/DL — SIGNIFICANT CHANGE UP (ref 1.6–2.6)
MCHC RBC-ENTMCNC: 31.1 PG — HIGH (ref 27–31)
MCHC RBC-ENTMCNC: 34.1 G/DL — SIGNIFICANT CHANGE UP (ref 32–36)
MCV RBC AUTO: 91.4 FL — SIGNIFICANT CHANGE UP (ref 81–99)
MONOCYTES # BLD AUTO: 0.5 K/UL — SIGNIFICANT CHANGE UP (ref 0–0.8)
MONOCYTES NFR BLD AUTO: 5.4 % — SIGNIFICANT CHANGE UP (ref 3–10)
NEUTROPHILS # BLD AUTO: 5.7 K/UL — SIGNIFICANT CHANGE UP (ref 1.8–8)
NEUTROPHILS NFR BLD AUTO: 59.7 % — SIGNIFICANT CHANGE UP (ref 37–73)
PHOSPHATE SERPL-MCNC: 2.9 MG/DL — SIGNIFICANT CHANGE UP (ref 2.4–4.7)
PLATELET # BLD AUTO: 198 K/UL — SIGNIFICANT CHANGE UP (ref 150–400)
POTASSIUM SERPL-MCNC: 4.2 MMOL/L — SIGNIFICANT CHANGE UP (ref 3.5–5.3)
POTASSIUM SERPL-SCNC: 4.2 MMOL/L — SIGNIFICANT CHANGE UP (ref 3.5–5.3)
RBC # BLD: 3.47 M/UL — LOW (ref 4.4–5.2)
RBC # FLD: 14.6 % — SIGNIFICANT CHANGE UP (ref 11–15.6)
SODIUM SERPL-SCNC: 140 MMOL/L — SIGNIFICANT CHANGE UP (ref 135–145)
SPECIMEN SOURCE: SIGNIFICANT CHANGE UP
WBC # BLD: 9.6 K/UL — SIGNIFICANT CHANGE UP (ref 4.8–10.8)
WBC # FLD AUTO: 9.6 K/UL — SIGNIFICANT CHANGE UP (ref 4.8–10.8)

## 2017-11-02 PROCEDURE — 99232 SBSQ HOSP IP/OBS MODERATE 35: CPT

## 2017-11-02 PROCEDURE — 99233 SBSQ HOSP IP/OBS HIGH 50: CPT

## 2017-11-02 PROCEDURE — 88305 TISSUE EXAM BY PATHOLOGIST: CPT | Mod: 26

## 2017-11-02 RX ORDER — OXYCODONE HYDROCHLORIDE 5 MG/1
5 TABLET ORAL EVERY 4 HOURS
Qty: 0 | Refills: 0 | Status: DISCONTINUED | OUTPATIENT
Start: 2017-11-02 | End: 2017-11-06

## 2017-11-02 RX ORDER — MORPHINE SULFATE 50 MG/1
2 CAPSULE, EXTENDED RELEASE ORAL EVERY 6 HOURS
Qty: 0 | Refills: 0 | Status: DISCONTINUED | OUTPATIENT
Start: 2017-11-02 | End: 2017-11-06

## 2017-11-02 RX ADMIN — Medication 650 MILLIGRAM(S): at 06:03

## 2017-11-02 RX ADMIN — Medication 100 MILLIGRAM(S): at 17:10

## 2017-11-02 RX ADMIN — OXYCODONE AND ACETAMINOPHEN 1 TABLET(S): 5; 325 TABLET ORAL at 19:57

## 2017-11-02 RX ADMIN — OXYCODONE AND ACETAMINOPHEN 1 TABLET(S): 5; 325 TABLET ORAL at 20:27

## 2017-11-02 RX ADMIN — SODIUM CHLORIDE 125 MILLILITER(S): 9 INJECTION INTRAMUSCULAR; INTRAVENOUS; SUBCUTANEOUS at 23:24

## 2017-11-02 RX ADMIN — Medication 1 SPRAY(S): at 17:10

## 2017-11-02 RX ADMIN — SODIUM CHLORIDE 125 MILLILITER(S): 9 INJECTION INTRAMUSCULAR; INTRAVENOUS; SUBCUTANEOUS at 14:25

## 2017-11-02 RX ADMIN — AZITHROMYCIN 500 MILLIGRAM(S): 500 TABLET, FILM COATED ORAL at 14:03

## 2017-11-02 RX ADMIN — Medication 1 SPRAY(S): at 06:03

## 2017-11-02 RX ADMIN — Medication 650 MILLIGRAM(S): at 06:35

## 2017-11-02 NOTE — PROGRESS NOTE ADULT - SUBJECTIVE AND OBJECTIVE BOX
INTERVAL HPI/OVERNIGHT EVENTS: No acute events overnight    SUBJECTIVE: Mild R axillary pain. No fevers, chills. No n/v/d. Tolerating diet.      MEDICATIONS  (STANDING):  azithromycin   Tablet 500 milliGRAM(s) Oral daily  fluticasone propionate 50 MICROgram(s)/spray Nasal Spray 1 Spray(s) Both Nostrils two times a day  influenza   Vaccine 0.5 milliLiter(s) IntraMuscular once  labetalol 100 milliGRAM(s) Oral every 12 hours  sodium chloride 0.9%. 1000 milliLiter(s) (125 mL/Hr) IV Continuous <Continuous>    MEDICATIONS  (PRN):  acetaminophen   Tablet. 650 milliGRAM(s) Oral every 6 hours PRN Mild Pain (1 - 3)  ALBUTerol    90 MICROgram(s) HFA Inhaler 1 Puff(s) Inhalation every 4 hours PRN Shortness of Breath and/or Wheezing  morphine  - Injectable 2 milliGRAM(s) IV Push every 6 hours PRN Severe Pain (7 - 10)  ondansetron Injectable 4 milliGRAM(s) IV Push every 6 hours PRN Nausea  oxyCODONE    5 mG/acetaminophen 325 mG 1 Tablet(s) Oral every 6 hours PRN Moderate Pain (4 - 6)  oxyCODONE    IR 5 milliGRAM(s) Oral every 4 hours PRN Moderate Pain (4 - 6)      Vital Signs Last 24 Hrs  T(C): 36.8 (02 Nov 2017 07:15), Max: 37 (01 Nov 2017 15:20)  T(F): 98.2 (02 Nov 2017 07:15), Max: 98.6 (01 Nov 2017 15:20)  HR: 71 (02 Nov 2017 07:15) (71 - 80)  BP: 118/75 (02 Nov 2017 07:15) (86/53 - 118/75)  BP(mean): --  RR: 18 (02 Nov 2017 07:15) (18 - 19)  SpO2: 97% (02 Nov 2017 07:15) (97% - 98%)    NAD, AOx3  No respiratory distress  No peripheral edema, normal ROM    I&O's Detail    01 Nov 2017 07:01  -  02 Nov 2017 07:00  --------------------------------------------------------  IN:    Sodium Chloride 0.9% IV Bolus: 1000 mL    sodium chloride 0.9%.: 500 mL  Total IN: 1500 mL    OUT:  Total OUT: 0 mL    Total NET: 1500 mL      02 Nov 2017 07:01  -  02 Nov 2017 13:26  --------------------------------------------------------  IN:    sodium chloride 0.9%.: 625 mL  Total IN: 625 mL    OUT:  Total OUT: 0 mL    Total NET: 625 mL          LABS:                        10.8   9.6   )-----------( 198      ( 02 Nov 2017 07:48 )             31.7     11-02    140  |  110<H>  |  9.0  ----------------------------<  90  4.2   |  22.0  |  0.66    Ca    7.4<L>      02 Nov 2017 07:46  Phos  2.9     11-02  Mg     1.9     11-02    TPro  8.0  /  Alb  4.3  /  TBili  0.5  /  DBili  x   /  AST  17  /  ALT  15  /  AlkPhos  62  10-31    PT/INR - ( 31 Oct 2017 16:26 )   PT: 11.1 sec;   INR: 1.01 ratio         PTT - ( 31 Oct 2017 16:26 )  PTT:28.2 sec

## 2017-11-02 NOTE — PROGRESS NOTE ADULT - SUBJECTIVE AND OBJECTIVE BOX
ID FOLLOW UP      AFEBRILE  WC NORMAL    R AXILIARY MASS PAINFUL    PE    TENDER R AXILLARY MASS BUT FIRMNESS NOTES TO IT    FOR IR BX FOR DEFINITIVE DX    NEEDS CULTURE/AFB/FUNGUS  PAS STAIN FOR BARTONELLA  PATH TO R/O BREAST CANCER      MADDIE ORO MD

## 2017-11-02 NOTE — PROGRESS NOTE ADULT - SUBJECTIVE AND OBJECTIVE BOX
CC pain on right axillary , no fever or chills   discussed with IR ,  surgery and DR Gill   Allergies    penicillin (Rash)    Intolerances  ROS : pain and swelling under right arm, chest otherwise negative       REVIEW OF SYSTEMS:    Vital Signs Last 24 Hrs  T(C): 36.8 (02 Nov 2017 07:15), Max: 37 (01 Nov 2017 15:20)  T(F): 98.2 (02 Nov 2017 07:15), Max: 98.6 (01 Nov 2017 15:20)  HR: 71 (02 Nov 2017 07:15) (60 - 80)  BP: 118/75 (02 Nov 2017 07:15) (86/53 - 118/75)  BP(mean): --  RR: 18 (02 Nov 2017 07:15) (18 - 19)  SpO2: 97% (02 Nov 2017 07:15) (97% - 98%)  PHYSICAL EXAM:    GENERAL: NAD, well-groomed, well-developed  CHEST/LUNG: Clear to auscultation percussion bilaterally; No rales, rhonchi, wheezing, or rubs  HEART: Regular rate and rhythm; No murmurs, rubs, or gallops  ABDOMEN: Soft, Nontender, Nondistended; Bowel sounds present  EXTREMITIES:  2+ Peripheral Pulses, No clubbing, cyanosis, or edema  SKIN : right axillary /chest skin lump tender to touch       LABS:                                     10.8   9.6   )-----------( 198      ( 02 Nov 2017 07:48 )             31.7   Basic Metabolic Panel in AM (11.02.17 @ 07:46)    Potassium, Serum: 4.2 mmol/L

## 2017-11-02 NOTE — PROGRESS NOTE ADULT - PROBLEM SELECTOR PLAN 1
surgery input appreciated suggest IR consult for biopsy   I had spoke to IR and plan for needle aspirate /biopsy today   discussed with Dr Gill

## 2017-11-02 NOTE — PROGRESS NOTE ADULT - ATTENDING COMMENTS
Discussed with hospitalist after reviewing images. Large right axillary mass vs node which should be amenable to core biopsy. If core biopsy is indeterminate/nondiagnostic, will plan for operative approach.

## 2017-11-03 DIAGNOSIS — R51 HEADACHE: ICD-10-CM

## 2017-11-03 LAB
B HENSELAE IGG SER-ACNC: SIGNIFICANT CHANGE UP TITER
B HENSELAE IGM SER-ACNC: SIGNIFICANT CHANGE UP TITER
B QUINTANA IGG SERPL-ACNC: SIGNIFICANT CHANGE UP TITER
B QUINTANA IGM SER-ACNC: SIGNIFICANT CHANGE UP TITER
NIGHT BLUE STAIN TISS: SIGNIFICANT CHANGE UP
SPECIMEN SOURCE: SIGNIFICANT CHANGE UP

## 2017-11-03 PROCEDURE — 99232 SBSQ HOSP IP/OBS MODERATE 35: CPT

## 2017-11-03 PROCEDURE — 99233 SBSQ HOSP IP/OBS HIGH 50: CPT

## 2017-11-03 RX ORDER — ENOXAPARIN SODIUM 100 MG/ML
40 INJECTION SUBCUTANEOUS DAILY
Qty: 0 | Refills: 0 | Status: DISCONTINUED | OUTPATIENT
Start: 2017-11-03 | End: 2017-11-06

## 2017-11-03 RX ORDER — IBUPROFEN 200 MG
400 TABLET ORAL EVERY 8 HOURS
Qty: 0 | Refills: 0 | Status: DISCONTINUED | OUTPATIENT
Start: 2017-11-03 | End: 2017-11-06

## 2017-11-03 RX ORDER — SODIUM CHLORIDE 9 MG/ML
1000 INJECTION INTRAMUSCULAR; INTRAVENOUS; SUBCUTANEOUS
Qty: 0 | Refills: 0 | Status: DISCONTINUED | OUTPATIENT
Start: 2017-11-03 | End: 2017-11-06

## 2017-11-03 RX ADMIN — Medication 100 MILLIGRAM(S): at 17:00

## 2017-11-03 RX ADMIN — ENOXAPARIN SODIUM 40 MILLIGRAM(S): 100 INJECTION SUBCUTANEOUS at 11:00

## 2017-11-03 RX ADMIN — OXYCODONE AND ACETAMINOPHEN 1 TABLET(S): 5; 325 TABLET ORAL at 05:14

## 2017-11-03 RX ADMIN — Medication 1 SPRAY(S): at 17:00

## 2017-11-03 RX ADMIN — Medication 650 MILLIGRAM(S): at 13:40

## 2017-11-03 RX ADMIN — Medication 650 MILLIGRAM(S): at 12:53

## 2017-11-03 RX ADMIN — Medication 400 MILLIGRAM(S): at 21:21

## 2017-11-03 RX ADMIN — Medication 400 MILLIGRAM(S): at 22:00

## 2017-11-03 RX ADMIN — Medication 1 SPRAY(S): at 05:14

## 2017-11-03 RX ADMIN — SODIUM CHLORIDE 150 MILLILITER(S): 9 INJECTION INTRAMUSCULAR; INTRAVENOUS; SUBCUTANEOUS at 21:31

## 2017-11-03 RX ADMIN — AZITHROMYCIN 500 MILLIGRAM(S): 500 TABLET, FILM COATED ORAL at 11:00

## 2017-11-03 RX ADMIN — OXYCODONE AND ACETAMINOPHEN 1 TABLET(S): 5; 325 TABLET ORAL at 07:28

## 2017-11-03 NOTE — PROGRESS NOTE ADULT - SUBJECTIVE AND OBJECTIVE BOX
ID FOLLOW UP      AFEBRILE  WC NORMAL    R AXILIARY MASS PAINFUL    PE    TENDER R AXILLARY MASS BUT FIRMNESS NOTED     S/P IR BX FOR DEFINITIVE DX     CULTURE/AFB/FUNGUS P  PAS STAIN FOR BARTONELLA  PATH TO R/O BREAST CANCER    AFEBRILE  BARTONELLA SEROLOGY PENDING      MADDIE ORO MD

## 2017-11-03 NOTE — PROGRESS NOTE ADULT - SUBJECTIVE AND OBJECTIVE BOX
SUBJECTIVE: Pain to R axilla. No fevers, chills. No nausea, vomiting. Tolerating diet. Ambulating. HA.       MEDICATIONS  (STANDING):  azithromycin   Tablet 500 milliGRAM(s) Oral daily  enoxaparin Injectable 40 milliGRAM(s) SubCutaneous daily  fluticasone propionate 50 MICROgram(s)/spray Nasal Spray 1 Spray(s) Both Nostrils two times a day  influenza   Vaccine 0.5 milliLiter(s) IntraMuscular once  labetalol 100 milliGRAM(s) Oral every 12 hours  sodium chloride 0.9%. 1000 milliLiter(s) (125 mL/Hr) IV Continuous <Continuous>    MEDICATIONS  (PRN):  acetaminophen   Tablet. 650 milliGRAM(s) Oral every 6 hours PRN Mild Pain (1 - 3)  ALBUTerol    90 MICROgram(s) HFA Inhaler 1 Puff(s) Inhalation every 4 hours PRN Shortness of Breath and/or Wheezing  ibuprofen  Tablet 400 milliGRAM(s) Oral every 8 hours PRN headache  morphine  - Injectable 2 milliGRAM(s) IV Push every 6 hours PRN Severe Pain (7 - 10)  ondansetron Injectable 4 milliGRAM(s) IV Push every 6 hours PRN Nausea  oxyCODONE    5 mG/acetaminophen 325 mG 1 Tablet(s) Oral every 6 hours PRN Moderate Pain (4 - 6)  oxyCODONE    IR 5 milliGRAM(s) Oral every 4 hours PRN Moderate Pain (4 - 6)      Vital Signs Last 24 Hrs  T(C): 37.2 (02 Nov 2017 23:25), Max: 37.4 (02 Nov 2017 17:12)  T(F): 99 (02 Nov 2017 23:25), Max: 99.3 (02 Nov 2017 17:12)  HR: 88 (02 Nov 2017 23:25) (68 - 88)  BP: 96/62 (02 Nov 2017 23:30) (96/62 - 125/82)  BP(mean): --  RR: 16 (02 Nov 2017 23:25) (16 - 18)  SpO2: 96% (02 Nov 2017 23:25) (96% - 100%)    NAD, AOx3, uncomfortable  PERRL, EOMI  No respiratory distress  No peripheral edema, normal ROM    I&O's Detail    02 Nov 2017 07:01  -  03 Nov 2017 07:00  --------------------------------------------------------  IN:    sodium chloride 0.9%.: 1625 mL  Total IN: 1625 mL    OUT:  Total OUT: 0 mL    Total NET: 1625 mL      03 Nov 2017 07:01  -  03 Nov 2017 09:49  --------------------------------------------------------  IN:    sodium chloride 0.9%.: 375 mL  Total IN: 375 mL    OUT:  Total OUT: 0 mL    Total NET: 375 mL          LABS:                        10.8   9.6   )-----------( 198      ( 02 Nov 2017 07:48 )             31.7     11-02    140  |  110<H>  |  9.0  ----------------------------<  90  4.2   |  22.0  |  0.66    Ca    7.4<L>      02 Nov 2017 07:46  Phos  2.9     11-02  Mg     1.9     11-02

## 2017-11-03 NOTE — PROGRESS NOTE ADULT - SUBJECTIVE AND OBJECTIVE BOX
Pt is seen examined c/o right sided trobbing headache since yesterday , pain on the axillary area intermittent   s/p IR core neeedle biopsy yesterday , all input appreciated       ROS :as above otherwise negative ]      REVIEW OF SYSTEMS:    Vital Signs Last 24 Hrs  T(C): 37.2 (02 Nov 2017 23:25), Max: 37.4 (02 Nov 2017 17:12)  T(F): 99 (02 Nov 2017 23:25), Max: 99.3 (02 Nov 2017 17:12)  HR: 88 (02 Nov 2017 23:25) (68 - 88)  BP: 96/62 (02 Nov 2017 23:30) (96/62 - 125/82)  BP(mean): --  RR: 16 (02 Nov 2017 23:25) (16 - 18)  SpO2: 96% (02 Nov 2017 23:25) (96% - 100%)  PHYSICAL EXAM:    GENERAL: NAD, well-groomed, well-developed  CHEST/LUNG: Clear to auscultation  bilaterally; No rales  HEART: Regular rate and rhythm; No murmurs, rubs, or gallops  ABDOMEN: Soft, Nontender, Nondistended; Bowel sounds present  EXTREMITIES:  2+ Peripheral Pulses, No clubbing, cyanosis, or edema  SKIN : right axillary /chest skin lump tender to touch       LABS:                                 11-02    140  |  110<H>  |  9.0  ----------------------------<  90  4.2   |  22.0  |  0.66    Ca    7.4<L>      02 Nov 2017 07:46  Phos  2.9     11-02  Mg     1.9     11-02                          10.8   9.6   )-----------( 198      ( 02 Nov 2017 07:48 )             31.7

## 2017-11-04 LAB
H CAPSUL MYC AB FLD-ACNC: SIGNIFICANT CHANGE UP
H CAPSUL YST AB FLD-ACNC: SIGNIFICANT CHANGE UP

## 2017-11-04 PROCEDURE — 99233 SBSQ HOSP IP/OBS HIGH 50: CPT

## 2017-11-04 PROCEDURE — 99232 SBSQ HOSP IP/OBS MODERATE 35: CPT

## 2017-11-04 RX ADMIN — Medication 100 MILLIGRAM(S): at 05:16

## 2017-11-04 RX ADMIN — SODIUM CHLORIDE 150 MILLILITER(S): 9 INJECTION INTRAMUSCULAR; INTRAVENOUS; SUBCUTANEOUS at 05:15

## 2017-11-04 RX ADMIN — Medication 1 SPRAY(S): at 05:16

## 2017-11-04 RX ADMIN — Medication 100 MILLIGRAM(S): at 17:00

## 2017-11-04 RX ADMIN — Medication 400 MILLIGRAM(S): at 05:16

## 2017-11-04 RX ADMIN — Medication 400 MILLIGRAM(S): at 06:57

## 2017-11-04 RX ADMIN — Medication 1 SPRAY(S): at 17:00

## 2017-11-04 RX ADMIN — ENOXAPARIN SODIUM 40 MILLIGRAM(S): 100 INJECTION SUBCUTANEOUS at 11:00

## 2017-11-04 RX ADMIN — AZITHROMYCIN 500 MILLIGRAM(S): 500 TABLET, FILM COATED ORAL at 11:00

## 2017-11-04 NOTE — PROGRESS NOTE ADULT - SUBJECTIVE AND OBJECTIVE BOX
Pt is seen c/o axillary pain improving , + pus apparently from site last night   s/p IR core needle  biopsy on 10/02 , ID follow up appreciated     ROS :as above otherwise negative ]      REVIEW OF SYSTEMS:    Vital Signs Last 24 Hrs  T(C): 36.8 (04 Nov 2017 07:40), Max: 37.2 (03 Nov 2017 16:00)  T(F): 98.2 (04 Nov 2017 07:40), Max: 99 (03 Nov 2017 16:00)  HR: 74 (04 Nov 2017 07:40) (72 - 82)  BP: 108/76 (04 Nov 2017 07:40) (107/66 - 128/81)  BP(mean): --  RR: 19 (04 Nov 2017 07:40) (18 - 19)  SpO2: 97% (03 Nov 2017 23:15) (97% - 97%)  PHYSICAL EXAM:    GENERAL: NAD, well-groomed, well-developed  CHEST/LUNG: Clear to auscultation  bilaterally; No rales  HEART: Regular rate and rhythm; No murmurs, rubs, or gallops  ABDOMEN: Soft, Nontender, Nondistended; Bowel sounds present  EXTREMITIES:  2+ Peripheral Pulses, No clubbing, cyanosis, or edema  SKIN : right axillary /chest skin lump tender to touch       LABS: Pt is seen c/o axillary pain improving , + pus apparently from site last night   s/p IR core needle  biopsy on 10/02 , ID follow up appreciated     ROS :as above otherwise negative ]      REVIEW OF SYSTEMS:    Vital Signs Last 24 Hrs  T(C): 36.8 (04 Nov 2017 07:40), Max: 37.2 (03 Nov 2017 16:00)  T(F): 98.2 (04 Nov 2017 07:40), Max: 99 (03 Nov 2017 16:00)  HR: 74 (04 Nov 2017 07:40) (72 - 82)  BP: 108/76 (04 Nov 2017 07:40) (107/66 - 128/81)  BP(mean): --  RR: 19 (04 Nov 2017 07:40) (18 - 19)  SpO2: 97% (03 Nov 2017 23:15) (97% - 97%)  PHYSICAL EXAM:    GENERAL: NAD, well-groomed, well-developed  CHEST/LUNG: Clear to auscultation  bilaterally; No rales  HEART: Regular rate and rhythm; No murmurs, rubs, or gallops  ABDOMEN: Soft, Nontender, Nondistended; Bowel sounds present  EXTREMITIES:  2+ Peripheral Pulses, No clubbing, cyanosis, or edema  SKIN : right axillary /chest skin lump tender to touch       LABS:                    Surgical Pathology Final Report - :   ACCESSION No:  [id number]    JIMENA ARTHUR                  1        Surgical Final Report          Final Diagnosis    Soft tissue, right axilla, core biopsy: Fragments of keratin  material with isolated acute inflammatory cells consistent  with contents of epidermal inclusion cyst.    Prasanna Valdovinos MD  (Electronic Signature)  Reported on: 11/03/17    Clinical History  Acute inflam cells    Specimen(s) Submitted  1     Right lymph node biopsy (axillary)    Gross Description  Thespecimen is received in formalin, labeled "right axilla,  lymph node".  It consists of four cores of white-tan, soft  tissue, ranging from 0.3-1.0 cm in length and each measuring 0.1  cm diameter.  The tissue is submitted entirely in two cassettes.  Tissue is placed in RPMI and tissue is sent for culture.    IMMEDIATE ASSESMENT:    Acute inflammatory cells    By: Dr. YAZAN Negro.    KV 11/02/17 14:30 (11.02.17 @ 12:33)

## 2017-11-04 NOTE — PROGRESS NOTE ADULT - PROBLEM SELECTOR PLAN 1
s/p core needle biopsy result pending , continue azithromycin pending result   gr stain no organism s/p core needle biopsy result noted imflamatory cell   will discuss with surgery team    continue azithromycin   gr stain no organism

## 2017-11-05 PROCEDURE — 99232 SBSQ HOSP IP/OBS MODERATE 35: CPT

## 2017-11-05 RX ORDER — LISINOPRIL 2.5 MG/1
20 TABLET ORAL DAILY
Qty: 0 | Refills: 0 | Status: DISCONTINUED | OUTPATIENT
Start: 2017-11-05 | End: 2017-11-06

## 2017-11-05 RX ORDER — LISINOPRIL 2.5 MG/1
35 TABLET ORAL
Qty: 0 | Refills: 0 | COMMUNITY

## 2017-11-05 RX ORDER — HYDROCHLOROTHIAZIDE 25 MG
12.5 TABLET ORAL DAILY
Qty: 0 | Refills: 0 | Status: CANCELLED | OUTPATIENT
Start: 2017-11-06 | End: 2017-11-06

## 2017-11-05 RX ADMIN — AZITHROMYCIN 500 MILLIGRAM(S): 500 TABLET, FILM COATED ORAL at 12:58

## 2017-11-05 RX ADMIN — Medication 100 MILLIGRAM(S): at 05:25

## 2017-11-05 RX ADMIN — SODIUM CHLORIDE 150 MILLILITER(S): 9 INJECTION INTRAMUSCULAR; INTRAVENOUS; SUBCUTANEOUS at 00:08

## 2017-11-05 RX ADMIN — SODIUM CHLORIDE 150 MILLILITER(S): 9 INJECTION INTRAMUSCULAR; INTRAVENOUS; SUBCUTANEOUS at 19:50

## 2017-11-05 RX ADMIN — Medication 400 MILLIGRAM(S): at 00:04

## 2017-11-05 RX ADMIN — ENOXAPARIN SODIUM 40 MILLIGRAM(S): 100 INJECTION SUBCUTANEOUS at 12:58

## 2017-11-05 RX ADMIN — LISINOPRIL 20 MILLIGRAM(S): 2.5 TABLET ORAL at 19:44

## 2017-11-05 RX ADMIN — Medication 400 MILLIGRAM(S): at 00:45

## 2017-11-05 RX ADMIN — Medication 1 SPRAY(S): at 05:25

## 2017-11-05 RX ADMIN — Medication 1 SPRAY(S): at 17:53

## 2017-11-05 NOTE — PROGRESS NOTE ADULT - SUBJECTIVE AND OBJECTIVE BOX
Pt is seen this am c/o less pain on axillary lump , anxious to go home , biopsy result reviewed with the patient   no new complaints       ROS :as above otherwise negative       REVIEW OF SYSTEMS:    Vital Signs Last 24 Hrs  T(C): 37.1 (05 Nov 2017 07:32), Max: 37.2 (04 Nov 2017 15:43)  T(F): 98.7 (05 Nov 2017 07:32), Max: 99 (04 Nov 2017 15:43)  HR: 77 (05 Nov 2017 07:32) (76 - 82)  BP: 133/89 (05 Nov 2017 07:32) (118/84 - 136/84)  BP(mean): --  RR: 19 (05 Nov 2017 07:32) (18 - 19)  SpO2: 99% (05 Nov 2017 05:24) (99% - 100%)    GENERAL: NAD, well-groomed, well-developed  CHEST/LUNG: Clear to auscultation  bilaterally; No rales  HEART: Regular rate and rhythm; No murmurs, rubs, or gallops  ABDOMEN: Soft, Nontender, Nondistended; Bowel sounds present  EXTREMITIES:  2+ Peripheral Pulses, No clubbing, cyanosis, or edema  SKIN : right axillary /chest skin lump mild  tenderness on palpation       LABS:                    Kunz    Surgical Final Report          Final Diagnosis    Soft tissue, right axilla, core biopsy: Fragments of keratin  material with isolated acute inflammatory cells consistent  with contents of epidermal inclusion cyst.    Prasanna Valdovinos MD  (Electronic Signature)  Reported on: 11/03/17    Clinical History  Acute inflam cells    Specimen(s) Submitted  1     Right lymph node biopsy (axillary)    Gross Description  Thespecimen is received in formalin, labeled "right axilla,  lymph node".  It consists of four cores of white-tan, soft  tissue, ranging from 0.3-1.0 cm in length and each measuring 0.1  cm diameter.  The tissue is submitted entirely in two cassettes.  Tissue is placed in RPMI and tissue is sent for culture.    IMMEDIATE ASSESMENT:    Acute inflammatory cells    By: Dr. YAZAN Negro.    ADRIANA 11/02/17 14:30 (11.02.17 @ 12:33) Pt is seen this am c/o less pain on axillary lump , anxious to go home , biopsy result reviewed with the patient   no new complaints       ROS :as above otherwise negative       REVIEW OF SYSTEMS:    Vital Signs Last 24 Hrs  T(C): 37.1 (05 Nov 2017 07:32), Max: 37.2 (04 Nov 2017 15:43)  T(F): 98.7 (05 Nov 2017 07:32), Max: 99 (04 Nov 2017 15:43)  HR: 77 (05 Nov 2017 07:32) (76 - 82)  BP: 133/89 (05 Nov 2017 07:32) (118/84 - 136/84)  BP(mean): --  RR: 19 (05 Nov 2017 07:32) (18 - 19)  SpO2: 99% (05 Nov 2017 05:24) (99% - 100%)      CHEST/LUNG: Clear to auscultation  bilaterally; No rales  HEART: Regular rate and rhythm; No murmurs, rubs, or gallops  ABDOMEN: Soft, Nontender, Nondistended; Bowel sounds present  EXTREMITIES:  2+ Peripheral Pulses, No clubbing, cyanosis, or edema  SKIN : right axillary /chest skin lump mild  tenderness on palpation       LABS:                    Kunz    Surgical Final Report          Final Diagnosis    Soft tissue, right axilla, core biopsy: Fragments of keratin  material with isolated acute inflammatory cells consistent  with contents of epidermal inclusion cyst.    Prasanna Valdovinos MD  (Electronic Signature)  Reported on: 11/03/17    Clinical History  Acute inflam cells    Specimen(s) Submitted  1     Right lymph node biopsy (axillary)    Gross Description  Thespecimen is received in formalin, labeled "right axilla,  lymph node".  It consists of four cores of white-tan, soft  tissue, ranging from 0.3-1.0 cm in length and each measuring 0.1  cm diameter.  The tissue is submitted entirely in two cassettes.  Tissue is placed in RPMI and tissue is sent for culture.    IMMEDIATE ASSESMENT:    Acute inflammatory cells    By: Dr. YAZAN Negro.    KV 11/02/17 14:30 (11.02.17 @ 12:33)

## 2017-11-05 NOTE — PROGRESS NOTE ADULT - PROBLEM SELECTOR PLAN 1
s/p core needle biopsy result noted imflamatory cell /cyst  will discuss with surgery team , left message to reevaluate for need to remove mass surgically   will call ID re antibiotic therapy if needed

## 2017-11-06 ENCOUNTER — TRANSCRIPTION ENCOUNTER (OUTPATIENT)
Age: 37
End: 2017-11-06

## 2017-11-06 VITALS
DIASTOLIC BLOOD PRESSURE: 81 MMHG | OXYGEN SATURATION: 99 % | RESPIRATION RATE: 18 BRPM | TEMPERATURE: 98 F | HEART RATE: 82 BPM | SYSTOLIC BLOOD PRESSURE: 125 MMHG

## 2017-11-06 DIAGNOSIS — Z29.9 ENCOUNTER FOR PROPHYLACTIC MEASURES, UNSPECIFIED: ICD-10-CM

## 2017-11-06 DIAGNOSIS — D64.9 ANEMIA, UNSPECIFIED: ICD-10-CM

## 2017-11-06 LAB
ANION GAP SERPL CALC-SCNC: 12 MMOL/L — SIGNIFICANT CHANGE UP (ref 5–17)
BUN SERPL-MCNC: 9 MG/DL — SIGNIFICANT CHANGE UP (ref 8–20)
CALCIUM SERPL-MCNC: 8.5 MG/DL — LOW (ref 8.6–10.2)
CHLORIDE SERPL-SCNC: 103 MMOL/L — SIGNIFICANT CHANGE UP (ref 98–107)
CO2 SERPL-SCNC: 23 MMOL/L — SIGNIFICANT CHANGE UP (ref 22–29)
CREAT SERPL-MCNC: 0.51 MG/DL — SIGNIFICANT CHANGE UP (ref 0.5–1.3)
CULTURE RESULTS: SIGNIFICANT CHANGE UP
GLUCOSE SERPL-MCNC: 88 MG/DL — SIGNIFICANT CHANGE UP (ref 70–115)
HCT VFR BLD CALC: 33 % — LOW (ref 37–47)
HGB BLD-MCNC: 11.6 G/DL — LOW (ref 12–16)
INR BLD: 1.12 RATIO — SIGNIFICANT CHANGE UP (ref 0.88–1.16)
MCHC RBC-ENTMCNC: 31.4 PG — HIGH (ref 27–31)
MCHC RBC-ENTMCNC: 35.2 G/DL — SIGNIFICANT CHANGE UP (ref 32–36)
MCV RBC AUTO: 89.4 FL — SIGNIFICANT CHANGE UP (ref 81–99)
PLATELET # BLD AUTO: 255 K/UL — SIGNIFICANT CHANGE UP (ref 150–400)
POTASSIUM SERPL-MCNC: 3.6 MMOL/L — SIGNIFICANT CHANGE UP (ref 3.5–5.3)
POTASSIUM SERPL-SCNC: 3.6 MMOL/L — SIGNIFICANT CHANGE UP (ref 3.5–5.3)
PROTHROM AB SERPL-ACNC: 12.3 SEC — SIGNIFICANT CHANGE UP (ref 9.8–12.7)
RBC # BLD: 3.69 M/UL — LOW (ref 4.4–5.2)
RBC # FLD: 13 % — SIGNIFICANT CHANGE UP (ref 11–15.6)
SODIUM SERPL-SCNC: 138 MMOL/L — SIGNIFICANT CHANGE UP (ref 135–145)
SPECIMEN SOURCE: SIGNIFICANT CHANGE UP
WBC # BLD: 12 K/UL — HIGH (ref 4.8–10.8)
WBC # FLD AUTO: 12 K/UL — HIGH (ref 4.8–10.8)

## 2017-11-06 PROCEDURE — T1013: CPT

## 2017-11-06 PROCEDURE — 76942 ECHO GUIDE FOR BIOPSY: CPT

## 2017-11-06 PROCEDURE — 80048 BASIC METABOLIC PNL TOTAL CA: CPT

## 2017-11-06 PROCEDURE — 87206 SMEAR FLUORESCENT/ACID STAI: CPT

## 2017-11-06 PROCEDURE — 86611 BARTONELLA ANTIBODY: CPT

## 2017-11-06 PROCEDURE — 99285 EMERGENCY DEPT VISIT HI MDM: CPT | Mod: 25

## 2017-11-06 PROCEDURE — 94640 AIRWAY INHALATION TREATMENT: CPT

## 2017-11-06 PROCEDURE — 71260 CT THORAX DX C+: CPT

## 2017-11-06 PROCEDURE — 85730 THROMBOPLASTIN TIME PARTIAL: CPT

## 2017-11-06 PROCEDURE — 87102 FUNGUS ISOLATION CULTURE: CPT

## 2017-11-06 PROCEDURE — 84702 CHORIONIC GONADOTROPIN TEST: CPT

## 2017-11-06 PROCEDURE — 85610 PROTHROMBIN TIME: CPT

## 2017-11-06 PROCEDURE — 36415 COLL VENOUS BLD VENIPUNCTURE: CPT

## 2017-11-06 PROCEDURE — 88305 TISSUE EXAM BY PATHOLOGIST: CPT

## 2017-11-06 PROCEDURE — 87015 SPECIMEN INFECT AGNT CONCNTJ: CPT

## 2017-11-06 PROCEDURE — 84132 ASSAY OF SERUM POTASSIUM: CPT

## 2017-11-06 PROCEDURE — 87116 MYCOBACTERIA CULTURE: CPT

## 2017-11-06 PROCEDURE — 87070 CULTURE OTHR SPECIMN AEROBIC: CPT

## 2017-11-06 PROCEDURE — 96375 TX/PRO/DX INJ NEW DRUG ADDON: CPT | Mod: XU

## 2017-11-06 PROCEDURE — 87075 CULTR BACTERIA EXCEPT BLOOD: CPT

## 2017-11-06 PROCEDURE — 80053 COMPREHEN METABOLIC PANEL: CPT

## 2017-11-06 PROCEDURE — 85027 COMPLETE CBC AUTOMATED: CPT

## 2017-11-06 PROCEDURE — 96374 THER/PROPH/DIAG INJ IV PUSH: CPT | Mod: XU

## 2017-11-06 PROCEDURE — 83735 ASSAY OF MAGNESIUM: CPT

## 2017-11-06 PROCEDURE — 86698 HISTOPLASMA ANTIBODY: CPT

## 2017-11-06 PROCEDURE — 99239 HOSP IP/OBS DSCHRG MGMT >30: CPT

## 2017-11-06 PROCEDURE — 84100 ASSAY OF PHOSPHORUS: CPT

## 2017-11-06 RX ORDER — SACCHAROMYCES BOULARDII 250 MG
1 POWDER IN PACKET (EA) ORAL
Qty: 14 | Refills: 0
Start: 2017-11-06 | End: 2017-11-13

## 2017-11-06 RX ORDER — AZITHROMYCIN 500 MG/1
1 TABLET, FILM COATED ORAL
Qty: 5 | Refills: 0
Start: 2017-11-06 | End: 2017-11-11

## 2017-11-06 RX ORDER — LABETALOL HCL 100 MG
1 TABLET ORAL
Qty: 60 | Refills: 0
Start: 2017-11-06 | End: 2017-12-06

## 2017-11-06 RX ADMIN — LISINOPRIL 20 MILLIGRAM(S): 2.5 TABLET ORAL at 04:54

## 2017-11-06 RX ADMIN — AZITHROMYCIN 500 MILLIGRAM(S): 500 TABLET, FILM COATED ORAL at 12:49

## 2017-11-06 RX ADMIN — ENOXAPARIN SODIUM 40 MILLIGRAM(S): 100 INJECTION SUBCUTANEOUS at 12:49

## 2017-11-06 RX ADMIN — Medication 1 SPRAY(S): at 04:54

## 2017-11-06 NOTE — PROGRESS NOTE ADULT - PROBLEM SELECTOR PROBLEM 1
Breast mass, right
Axillary mass, right
Breast mass, right

## 2017-11-06 NOTE — DISCHARGE NOTE ADULT - PATIENT PORTAL LINK FT
“You can access the [employer] Patient Portal, offered by [facility], by registering with the following website: [email or web address]”

## 2017-11-06 NOTE — PROGRESS NOTE ADULT - ASSESSMENT
right breast mass and LAP , painful with leukocytosis
36 y/o F with large R axillary mass vs node that is amenable to core biopsy. If core biopsy is indeterminate/nondiagnostic, will plan for operative approach.    Plan  -Awaiting pathology report  -Medical management as per primary team
37 years old female with PMH of HTN presented to the ER for the evaluation of right axillary mass. Patient states the mass has been there for >6 months but for the past few days its has become pain full. Pain is constant, 10/10, non radiating and not associated with nausea, vomiting, SOB, chest pain .  S/P  BX  NOV 2   CX NEG TO DATE  PATH PENDING BARTONELLA NEGATIVE  WILL FOLLOW UP
38 y/o F with R axillary soft tissue mass, appears to be superficial on imaging.     Plan:  -Recommend IR core needle biopsy  -Will continue to follow
Patient is a 38 y/o female with pmhx of htn and asthma who presents with a right breast/axillary mass. Right breast mass and LAP , painful with leukocytosis , seen by ID and surgery team  s/p IR core needle biopsy on 10/02 ,biopsy + inflamatory cells, probable epidermal inclusion cyst , bartonella titers negative , on azithromycin per ID. Plan to d/c home with follow up today
right breast mass and LAP , painful with leukocytosis , seen by ID and surgery team
right breast mass and LAP , painful with leukocytosis , seen by ID and surgery team  s/p IR core needle biopsy on 10/02 , She is r/o bartonella, culture no growth   on azithromycin for possible bartonella
right breast mass and LAP , painful with leukocytosis , seen by ID and surgery team  s/p IR core needle biopsy on 10/02 ,biopsy + inflamatory cells, probable epidermal inclusion cyst , bartonella titers negative , on azithromycin per ID on admission for possible bartonella
right breast mass and LAP , painful with leukocytosis , seen by ID and surgery team  s/p IR core needle biopsy yesterday

## 2017-11-06 NOTE — DISCHARGE NOTE ADULT - CARE PLAN
Principal Discharge DX:	Axillary mass, right  Goal:	complete antibiotics  Instructions for follow-up, activity and diet:	-please finish the antibiotics that were given to you for 5 more days with florastor   -if you are unable to get the florastor, please take a probiotic daily   -Local wound care: wash as per routine, manipulate your breast to allow discharge to come out as shown and place mepilex dressing on region daily for 7 days until follow up   -please see surgeon (Dr Mckay), ID (Dr Gill) and regular doctor all within 1 week after discharge  Secondary Diagnosis:	Normocytic anemia  Instructions for follow-up, activity and diet:	please follow up on your blood work with pcp (blood work from hospital as below)  Secondary Diagnosis:	High blood pressure  Instructions for follow-up, activity and diet:	resume home medications, we have also added another medication to your profile (labetalol). Please check your Blood pressure at home and if the top number is always over 150 or if the bottom number is always over 100, please call your doctor  Secondary Diagnosis:	Asthma  Instructions for follow-up, activity and diet:	continue with routine medications  Secondary Diagnosis:	Acute nonintractable headache, unspecified headache type  Instructions for follow-up, activity and diet:	resolved. Please drink an adequate amount of water a day (about 1 gallon)

## 2017-11-06 NOTE — DISCHARGE NOTE ADULT - CARE PROVIDER_API CALL
Doe Lyon), Internal Medicine  [address] [place] [address]  Phone: [phone]  Fax: [phone]    Naren Gill), Infectious Disease; Internal Medicine  [address] [place] [address]  Phone: [phone]  Fax: [phone]    Celsa Mckay), Surgery; Surgical Critical Care  [address] [place] [address]  Phone: [phone]  Fax: [phone]

## 2017-11-06 NOTE — PROGRESS NOTE ADULT - PROBLEM SELECTOR PLAN 1
s/p core needle biopsy result noted imflamatory cell /cyst. Leucocytosis noted, no fevers and clinically improved  seen by surgical team   LWC   complete abx for 5 more days. Plan to d/c to home and f/up as OP

## 2017-11-06 NOTE — PROGRESS NOTE ADULT - PROBLEM SELECTOR PLAN 3
stable
motrin prn ordered
motrin prn ordered, improving
resolved
resolved  encourage oral hydration
stable

## 2017-11-06 NOTE — PROGRESS NOTE ADULT - PROBLEM SELECTOR PLAN 2
likely due to breast pathology pending work up , ? biopsy   continue antibiotics
likely due to breast pathology , biopsy today   continue antibiotics
stable
stable  No further intervention at this time

## 2017-11-06 NOTE — DISCHARGE NOTE ADULT - HOSPITAL COURSE
Patient is a 38 y/o female with pmhx of htn and asthma who presents with a right breast/axillary mass. Right breast mass and LAP , painful with leukocytosis , seen by ID and surgery team  s/p IR core needle biopsy on 10/02 ,biopsy + inflamatory cells, probable epidermal inclusion cyst , bartonella titers negative , on azithromycin per ID. Plan to d/c home with follow up today. She is to continue with abx for 5 more days. Plan conveyed to patient.     Please see lab work as below.

## 2017-11-06 NOTE — DISCHARGE NOTE ADULT - PLAN OF CARE
complete antibiotics -please finish the antibiotics that were given to you for 5 more days with florastor   -if you are unable to get the florastor, please take a probiotic daily   -Local wound care: wash as per routine, manipulate your breast to allow discharge to come out as shown and place mepilex dressing on region daily for 7 days until follow up   -please see surgeon (Dr Mckay), ID (Dr Gill) and regular doctor all within 1 week after discharge please follow up on your blood work with pcp (blood work from hospital as below) resume home medications, we have also added another medication to your profile (labetalol). Please check your Blood pressure at home and if the top number is always over 150 or if the bottom number is always over 100, please call your doctor continue with routine medications resolved. Please drink an adequate amount of water a day (about 1 gallon)

## 2017-11-06 NOTE — PROGRESS NOTE ADULT - PROBLEM SELECTOR PROBLEM 3
Mild asthma without complication, unspecified whether persistent
Acute nonintractable headache, unspecified headache type
Mild asthma without complication, unspecified whether persistent

## 2017-11-06 NOTE — PROGRESS NOTE ADULT - PROVIDER SPECIALTY LIST ADULT
Anesthesia
Hospitalist
Infectious Disease
Surgery
Surgery
Hospitalist

## 2017-11-06 NOTE — DISCHARGE NOTE ADULT - OTHER SIGNIFICANT FINDINGS
11.6   12.0  )-----------( 255      ( 06 Nov 2017 08:01 )             33.0   11-06    138  |  103  |  9.0  ----------------------------<  88  3.6   |  23.0  |  0.51    Ca    8.5<L>      06 Nov 2017 08:01      Surgical report:     Final Diagnosis    Soft tissue, right axilla, core biopsy: Fragments of keratin  material with isolated acute inflammatory cells consistent  with contents of epidermal inclusion cyst.      Clinical History  Acute inflam cells    Specimen(s) Submitted  1     Right lymph node biopsy (axillary)    Gross Description  Thespecimen is received in formalin, labeled "right axilla,  lymph node".  It consists of four cores of white-tan, soft  tissue, ranging from 0.3-1.0 cm in length and each measuring 0.1  cm diameter.  The tissue is submitted entirely in two cassettes.  Tissue is placed in RPMI and tissue is sent for culture.    IMMEDIATE ASSESMENT:    Acute inflammatory cells    Skin bx - negative   LN biopsy - few corynebacterium species

## 2017-11-06 NOTE — DISCHARGE NOTE ADULT - MEDICATION SUMMARY - MEDICATIONS TO TAKE
I will START or STAY ON the medications listed below when I get home from the hospital:    ibuprofen 600 mg oral tablet  -- 1 tab(s) by mouth every 6 hours  -- Do not take this drug if you are pregnant.  It is very important that you take or use this exactly as directed.  Do not skip doses or discontinue unless directed by your doctor.  May cause drowsiness or dizziness.  Obtain medical advice before taking any non-prescription drugs as some may affect the action of this medication.  Take with food or milk.    -- Indication: For Pain    oxyCODONE-acetaminophen 5 mg-325 mg oral tablet  -- 1 tab(s) by mouth every 6 hours, As needed, Moderate Pain (4 - 6) MDD:4 tabs  -- Indication: For Pain    lisinopril-hydroCHLOROthiazide 20 mg-12.5 mg oral tablet  -- 1 tab(s) by mouth once a day  -- Indication: For HTN (hypertension)    labetalol 100 mg oral tablet  -- 1 tab(s) by mouth every 12 hours  -- Indication: For HTN (hypertension)    azithromycin 500 mg oral tablet  -- 1 tab(s) by mouth once a day  -- Indication: For inclusion cyst    fluticasone nasal 50 mcg/inh nasal spray  -- 1 spray(s) into nose 2 times a day  -- For the nose.  It is very important that you take or use this exactly as directed.  Do not skip doses or discontinue unless directed by your doctor.    -- Indication: For Nasal congestion    Florastor 250 mg oral capsule  -- 1 cap(s) by mouth 2 times a day   -- Indication: For Prophylactic measure

## 2017-11-06 NOTE — PROGRESS NOTE ADULT - PROBLEM SELECTOR PROBLEM 2
Lymphadenopathy, axillary
Lymphadenopathy, axillary
Mild asthma without complication, unspecified whether persistent

## 2017-11-06 NOTE — PROGRESS NOTE ADULT - SUBJECTIVE AND OBJECTIVE BOX
Patient is a 37y old  Female who presents with a chief complaint of axillary nodule (31 Oct 2017 19:51)    Patient seen and examined at bedside. No acute distress and no acute overnight events. No pain complaints. No fevers/chills. States that she is ready to go home.     ROS: No chest pain, palpitations, sob, light headedness/dizziness, difficulty breathing/cough, fevers/chills, abdominal pain, n/v, diarrhea/constipation, dysuria or increased urinary frequency.,    Vital Signs Last 24 Hrs  T(C): 36.8 (06 Nov 2017 08:39), Max: 37.6 (05 Nov 2017 15:53)  T(F): 98.2 (06 Nov 2017 08:39), Max: 99.6 (05 Nov 2017 15:53)  HR: 82 (06 Nov 2017 08:39) (82 - 88)  BP: 125/81 (06 Nov 2017 08:39) (125/81 - 144/90)  BP(mean): --  RR: 18 (06 Nov 2017 08:39) (16 - 20)  SpO2: 99% (06 Nov 2017 08:39) (98% - 99%)    Physical exam:   Gen: young woman in nad   CHEST/LUNG: Clear to auscultation  bilaterally; No rales  HEART: Regular rate and rhythm; No murmurs, rubs, or gallops  ABDOMEN: Soft, Nontender, Nondistended; Bowel sounds present  EXTREMITIES:  2+ Peripheral Pulses, No clubbing, cyanosis, or edema  SKIN : right axillary /chest skin lump mild  tenderness on palpation, thick/purulent yellow discharge on manipulation     LABS:                        11.6   12.0  )-----------( 255      ( 06 Nov 2017 08:01 )             33.0   11-06    138  |  103  |  9.0  ----------------------------<  88  3.6   |  23.0  |  0.51    Ca    8.5<L>      06 Nov 2017 08:01      Surgical report:     Final Diagnosis    Soft tissue, right axilla, core biopsy: Fragments of keratin  material with isolated acute inflammatory cells consistent  with contents of epidermal inclusion cyst.      Clinical History  Acute inflam cells    Specimen(s) Submitted  1     Right lymph node biopsy (axillary)    Gross Description  Thespecimen is received in formalin, labeled "right axilla,  lymph node".  It consists of four cores of white-tan, soft  tissue, ranging from 0.3-1.0 cm in length and each measuring 0.1  cm diameter.  The tissue is submitted entirely in two cassettes.  Tissue is placed in RPMI and tissue is sent for culture.    IMMEDIATE ASSESMENT:    Acute inflammatory cells    By: Dr. YAZAN Negro.    KV 11/02/17 14:30 (11.02.17 @ 12:33)

## 2017-11-07 LAB
CULTURE RESULTS: SIGNIFICANT CHANGE UP
SPECIMEN SOURCE: SIGNIFICANT CHANGE UP

## 2017-11-27 LAB
CULTURE RESULTS: SIGNIFICANT CHANGE UP
SPECIMEN SOURCE: SIGNIFICANT CHANGE UP

## 2017-12-23 LAB
CULTURE RESULTS: SIGNIFICANT CHANGE UP
SPECIMEN SOURCE: SIGNIFICANT CHANGE UP

## 2018-03-24 ENCOUNTER — EMERGENCY (EMERGENCY)
Facility: HOSPITAL | Age: 38
LOS: 1 days | Discharge: HOME | End: 2018-03-24
Attending: EMERGENCY MEDICINE
Payer: MEDICAID

## 2018-03-24 VITALS
OXYGEN SATURATION: 99 % | SYSTOLIC BLOOD PRESSURE: 111 MMHG | HEART RATE: 69 BPM | TEMPERATURE: 98 F | DIASTOLIC BLOOD PRESSURE: 77 MMHG | HEIGHT: 65 IN | RESPIRATION RATE: 20 BRPM | WEIGHT: 139.99 LBS

## 2018-03-24 VITALS
SYSTOLIC BLOOD PRESSURE: 116 MMHG | HEART RATE: 74 BPM | OXYGEN SATURATION: 98 % | TEMPERATURE: 98 F | RESPIRATION RATE: 16 BRPM | DIASTOLIC BLOOD PRESSURE: 72 MMHG

## 2018-03-24 LAB — HCG UR QL: NEGATIVE — SIGNIFICANT CHANGE UP

## 2018-03-24 PROCEDURE — 99283 EMERGENCY DEPT VISIT LOW MDM: CPT | Mod: 25

## 2018-03-24 PROCEDURE — 96372 THER/PROPH/DIAG INJ SC/IM: CPT

## 2018-03-24 PROCEDURE — 81025 URINE PREGNANCY TEST: CPT

## 2018-03-24 PROCEDURE — 99283 EMERGENCY DEPT VISIT LOW MDM: CPT

## 2018-03-24 RX ORDER — PROCHLORPERAZINE MALEATE 5 MG
10 TABLET ORAL ONCE
Qty: 0 | Refills: 0 | Status: COMPLETED | OUTPATIENT
Start: 2018-03-24 | End: 2018-03-24

## 2018-03-24 RX ORDER — DIPHENHYDRAMINE HCL 50 MG
1 CAPSULE ORAL
Qty: 15 | Refills: 0
Start: 2018-03-24 | End: 2018-03-28

## 2018-03-24 RX ORDER — METOCLOPRAMIDE HCL 10 MG
10 TABLET ORAL ONCE
Qty: 0 | Refills: 0 | Status: COMPLETED | OUTPATIENT
Start: 2018-03-24 | End: 2018-03-24

## 2018-03-24 RX ORDER — IBUPROFEN 200 MG
1 TABLET ORAL
Qty: 20 | Refills: 0
Start: 2018-03-24 | End: 2018-03-28

## 2018-03-24 RX ORDER — ACETAMINOPHEN 500 MG
650 TABLET ORAL ONCE
Qty: 0 | Refills: 0 | Status: COMPLETED | OUTPATIENT
Start: 2018-03-24 | End: 2018-03-24

## 2018-03-24 RX ORDER — METOCLOPRAMIDE HCL 10 MG
1 TABLET ORAL
Qty: 28 | Refills: 0
Start: 2018-03-24 | End: 2018-03-30

## 2018-03-24 RX ORDER — KETOROLAC TROMETHAMINE 30 MG/ML
60 SYRINGE (ML) INJECTION ONCE
Qty: 0 | Refills: 0 | Status: DISCONTINUED | OUTPATIENT
Start: 2018-03-24 | End: 2018-03-24

## 2018-03-24 RX ORDER — ACETAMINOPHEN 500 MG
2 TABLET ORAL
Qty: 60 | Refills: 0
Start: 2018-03-24 | End: 2018-03-28

## 2018-03-24 RX ORDER — DIPHENHYDRAMINE HCL 50 MG
50 CAPSULE ORAL ONCE
Qty: 0 | Refills: 0 | Status: COMPLETED | OUTPATIENT
Start: 2018-03-24 | End: 2018-03-24

## 2018-03-24 RX ADMIN — Medication 10 MILLIGRAM(S): at 08:03

## 2018-03-24 RX ADMIN — Medication 60 MILLIGRAM(S): at 09:18

## 2018-03-24 RX ADMIN — Medication 50 MILLIGRAM(S): at 08:04

## 2018-03-24 RX ADMIN — Medication 10 MILLIGRAM(S): at 09:18

## 2018-03-24 RX ADMIN — Medication 650 MILLIGRAM(S): at 08:04

## 2018-03-24 RX ADMIN — Medication 650 MILLIGRAM(S): at 09:17

## 2018-03-24 NOTE — ED ADULT NURSE NOTE - OBJECTIVE STATEMENT
States headache for past 3 days, nausea present, denies vomiting, denies any recent trauma, denies blurred vision, LMP 3/7/18

## 2018-03-24 NOTE — ED PROVIDER NOTE - PROGRESS NOTE DETAILS
patient feels better, wants to go home to sleep, states lots of stress with two children, denies any trauma or falls, denies any fevers or neck pain, advised to f/u with neuro

## 2018-03-24 NOTE — ED PROVIDER NOTE - OBJECTIVE STATEMENT
Patient is a 37 y/o F with PMH of HTN and asthma c/o headache x 3 days. Patient describes pain as constant, " feels like someone hitting her in the head", starting in frontal area and radiating to occipital area. Patient denies ever having similar symptoms before. Also admits to nausea and dizziness x 3 days. Patient states that she took Advil with no relief. Denies LOC, trauma, fever, chills, blurry vision, floaters, vomiting.

## 2018-03-24 NOTE — ED PROVIDER NOTE - ATTENDING CONTRIBUTION TO CARE
pt comes in c/o headache , nausea and dizziness   neuro nonfocal   + stressors    normal exam   meds , neurology

## 2018-03-24 NOTE — ED ADULT NURSE REASSESSMENT NOTE - NS ED NURSE REASSESS COMMENT FT1
Pt remains alert and oriented x4 with even and unlabored respirations. Denies pain relief. Provider to be made aware. Denies worsening of symptoms. Safety maintained. RN to continue to monitor and reassess.

## 2018-03-25 ENCOUNTER — EMERGENCY (EMERGENCY)
Facility: HOSPITAL | Age: 38
LOS: 1 days | Discharge: HOME | End: 2018-03-25
Attending: EMERGENCY MEDICINE
Payer: MEDICAID

## 2018-03-25 VITALS
TEMPERATURE: 99 F | RESPIRATION RATE: 16 BRPM | OXYGEN SATURATION: 100 % | DIASTOLIC BLOOD PRESSURE: 68 MMHG | SYSTOLIC BLOOD PRESSURE: 101 MMHG | HEART RATE: 72 BPM

## 2018-03-25 VITALS — WEIGHT: 139.99 LBS | HEIGHT: 65 IN

## 2018-03-25 PROCEDURE — 99283 EMERGENCY DEPT VISIT LOW MDM: CPT

## 2018-03-25 RX ORDER — METOCLOPRAMIDE HCL 10 MG
10 TABLET ORAL ONCE
Qty: 0 | Refills: 0 | Status: COMPLETED | OUTPATIENT
Start: 2018-03-25 | End: 2018-03-25

## 2018-03-25 RX ORDER — DIAZEPAM 5 MG
5 TABLET ORAL ONCE
Qty: 0 | Refills: 0 | Status: DISCONTINUED | OUTPATIENT
Start: 2018-03-25 | End: 2018-03-25

## 2018-03-25 RX ADMIN — Medication 10 MILLIGRAM(S): at 20:07

## 2018-03-25 RX ADMIN — Medication 5 MILLIGRAM(S): at 20:07

## 2018-03-25 NOTE — ED STATDOCS - ATTENDING CONTRIBUTION TO CARE
I, Mira Burger, performed the initial face to face bedside interview with this patient regarding history of present illness, review of symptoms and relevant past medical, social and family history.  I completed an independent physical examination.  I was the initial provider who evaluated this patient. I have signed out the follow up of any pending tests (i.e. labs, radiological studies) to the ACP.  I have communicated the patient’s plan of care and disposition with the ACP.  pt with h/o headaches presents with a headache and nausea with a non-focal exam and resolution after meds. pt to follow up with pmd

## 2018-03-25 NOTE — ED STATDOCS - MEDICAL DECISION MAKING DETAILS
VSS, BP at patients baseline. No neuro deficits on exam, CT head not warranted. Pts complaints/symptoms consistent with migraine.. will treat migraine with valium and reglan and reassess. Pt needs to follow up with o/p neurology.

## 2018-03-25 NOTE — ED ADULT TRIAGE NOTE - CHIEF COMPLAINT QUOTE
' I was here yesterday for headache and they gave me medicine and it is not working. I have pain on my back and neck and nausea. " Pt states she has hx of HBP and takes medicine.  Pt was evaluated yesterday for same and prescribed Ibuprofen 600mg and Naprosyn pt states no relief with meds.

## 2018-03-25 NOTE — ED STATDOCS - PLAN OF CARE
will treat with valium and reglan  pt to be discharged  follow up o/p with neurology as recommended  return to ED if symptoms worsen

## 2018-03-25 NOTE — ED STATDOCS - CARE PLAN
Principal Discharge DX:	Migraine  Assessment and plan of treatment:	will treat with valium and reglan  pt to be discharged  follow up o/p with neurology as recommended  return to ED if symptoms worsen

## 2018-03-25 NOTE — ED STATDOCS - OBJECTIVE STATEMENT
38 y.o F with no PMH presents to ED complaining of headache. Pt was at [facility] yesterday for same complaint, headache resolved after medications in ED and then  woke up experiencing the same headache. Pt describes headache as localized above her left eye, pressure like pain, worsened with bright lights/sound. Pt denies n/v, fever, chills, confusion, loc, syncope, trauma. Pt states has high blood pressure, compliant with medications, and checks BP regularly. Pt denies hx of migraines in the past.

## 2018-03-25 NOTE — ED STATDOCS - PHYSICAL EXAMINATION
PE: General: NAD. Eyes: PERRLA, EOM intact. Neck: Supple and symmetrical, no JVD. CV: RRR, no m/r/g. Lungs: CTA b/l, no use of accessory muscles, no wheezes, rales, rhonchi. Skin: warm, dry, no rashes. Psych: normal mood/affect. Abdomen: Normal BS, soft, NT/ND. Extremities: no edema, cyanosis. Musculoskeletal:  good strength b/l UE/LE, normal ROM, no joint swelling. Neuro: A & O X 3, CN 2-12 grossly intact, no sensory or motor deficit.

## 2018-04-17 ENCOUNTER — APPOINTMENT (OUTPATIENT)
Dept: DERMATOLOGY | Facility: CLINIC | Age: 38
End: 2018-04-17

## 2018-07-02 ENCOUNTER — EMERGENCY (EMERGENCY)
Facility: HOSPITAL | Age: 38
LOS: 1 days | Discharge: HOME | End: 2018-07-02
Attending: STUDENT IN AN ORGANIZED HEALTH CARE EDUCATION/TRAINING PROGRAM
Payer: SELF-PAY

## 2018-07-02 VITALS
OXYGEN SATURATION: 100 % | RESPIRATION RATE: 20 BRPM | SYSTOLIC BLOOD PRESSURE: 120 MMHG | TEMPERATURE: 98 F | HEART RATE: 70 BPM | DIASTOLIC BLOOD PRESSURE: 87 MMHG

## 2018-07-02 VITALS — WEIGHT: 139.99 LBS | HEIGHT: 65 IN

## 2018-07-02 PROCEDURE — 99282 EMERGENCY DEPT VISIT SF MDM: CPT

## 2018-07-02 NOTE — ED ADULT TRIAGE NOTE - CHIEF COMPLAINT QUOTE
patient states that she fell in the bathroom hitting back of right thigh - causing large hematoma and back of right arm. denies hitting head or being on blood thinners

## 2018-07-03 NOTE — ED PROVIDER NOTE - MEDICAL DECISION MAKING DETAILS
Patient states she has been here for several hours and wants to take her children home. Declining pain meds or further studies/scans at this time.

## 2018-07-03 NOTE — ED PROVIDER NOTE - OBJECTIVE STATEMENT
37 y/o F presents to the ED c/o large area of bruising to R hip s/p slip and fall while in the bathroom at home today. Also notes having pain to her R arm. 37 y/o F with PMHx of asthma and HTN and PSHx of  presents to the ED c/o large area of bruising to R hip s/p slip and fall while in the bathroom at home today around . Also notes having pain to her R arm. Denies difficulty ambulating, LOC, head trauma, headache or visual changes after the fall. No blood thinner use and did not self-medicate PTA. No further acute complaints at this time.

## 2018-11-03 NOTE — DISCHARGE NOTE ADULT - SECONDARY DIAGNOSIS.
Normocytic anemia High blood pressure Asthma Acute nonintractable headache, unspecified headache type no

## 2018-11-19 ENCOUNTER — APPOINTMENT (OUTPATIENT)
Dept: OBGYN | Facility: CLINIC | Age: 38
End: 2018-11-19

## 2019-01-25 ENCOUNTER — APPOINTMENT (OUTPATIENT)
Dept: OBGYN | Facility: CLINIC | Age: 39
End: 2019-01-25
Payer: COMMERCIAL

## 2019-01-25 VITALS
HEART RATE: 75 BPM | HEIGHT: 65 IN | SYSTOLIC BLOOD PRESSURE: 137 MMHG | BODY MASS INDEX: 24.83 KG/M2 | WEIGHT: 149.06 LBS | DIASTOLIC BLOOD PRESSURE: 89 MMHG

## 2019-01-25 DIAGNOSIS — Z82.49 FAMILY HISTORY OF ISCHEMIC HEART DISEASE AND OTHER DISEASES OF THE CIRCULATORY SYSTEM: ICD-10-CM

## 2019-01-25 DIAGNOSIS — Z78.9 OTHER SPECIFIED HEALTH STATUS: ICD-10-CM

## 2019-01-25 PROCEDURE — 99385 PREV VISIT NEW AGE 18-39: CPT

## 2019-01-28 LAB
C TRACH RRNA SPEC QL NAA+PROBE: NOT DETECTED
HPV HIGH+LOW RISK DNA PNL CVX: NOT DETECTED
N GONORRHOEA RRNA SPEC QL NAA+PROBE: NOT DETECTED
SOURCE TP AMPLIFICATION: NORMAL

## 2019-01-31 LAB — CYTOLOGY CVX/VAG DOC THIN PREP: NORMAL

## 2019-03-03 ENCOUNTER — EMERGENCY (EMERGENCY)
Facility: HOSPITAL | Age: 39
LOS: 1 days | End: 2019-03-03
Attending: EMERGENCY MEDICINE
Payer: COMMERCIAL

## 2019-03-03 VITALS
HEART RATE: 84 BPM | DIASTOLIC BLOOD PRESSURE: 83 MMHG | OXYGEN SATURATION: 100 % | HEIGHT: 64 IN | RESPIRATION RATE: 18 BRPM | TEMPERATURE: 98 F | WEIGHT: 145.06 LBS | SYSTOLIC BLOOD PRESSURE: 131 MMHG

## 2019-03-03 PROCEDURE — 99283 EMERGENCY DEPT VISIT LOW MDM: CPT

## 2019-03-03 RX ORDER — HYDROXYZINE HCL 10 MG
1 TABLET ORAL
Qty: 21 | Refills: 0
Start: 2019-03-03 | End: 2019-03-09

## 2019-03-03 RX ORDER — FLUOCINONIDE/EMOLLIENT BASE 0.05 %
1 CREAM (GRAM) TOPICAL
Qty: 30 | Refills: 0
Start: 2019-03-03 | End: 2019-03-16

## 2019-03-03 NOTE — ED STATDOCS - OBJECTIVE STATEMENT
40 y/o F pt with significant PMHx of Asthma and HTN presents to the ED c/o rash to R hand onset this week. Reports itchiness. She has not applied anything to the rash. Denies fever, or chills. No further complaints at this time.

## 2019-03-03 NOTE — ED ADULT TRIAGE NOTE - CHIEF COMPLAINT QUOTE
pt a+ox3, reports rash to right hand x 1 week. pt reports itchiness and pain to right hand. no drainage or breaks in skin noted.

## 2019-03-06 ENCOUNTER — APPOINTMENT (OUTPATIENT)
Dept: DERMATOLOGY | Facility: CLINIC | Age: 39
End: 2019-03-06

## 2019-05-04 ENCOUNTER — EMERGENCY (EMERGENCY)
Facility: HOSPITAL | Age: 39
LOS: 1 days | Discharge: HOME | End: 2019-05-04
Attending: EMERGENCY MEDICINE
Payer: COMMERCIAL

## 2019-05-04 VITALS
DIASTOLIC BLOOD PRESSURE: 74 MMHG | HEIGHT: 65 IN | RESPIRATION RATE: 18 BRPM | TEMPERATURE: 99 F | SYSTOLIC BLOOD PRESSURE: 109 MMHG | WEIGHT: 139.99 LBS | HEART RATE: 69 BPM | OXYGEN SATURATION: 100 %

## 2019-05-04 PROCEDURE — 96374 THER/PROPH/DIAG INJ IV PUSH: CPT

## 2019-05-04 PROCEDURE — 99284 EMERGENCY DEPT VISIT MOD MDM: CPT

## 2019-05-04 PROCEDURE — 99284 EMERGENCY DEPT VISIT MOD MDM: CPT | Mod: 25

## 2019-05-04 PROCEDURE — 96375 TX/PRO/DX INJ NEW DRUG ADDON: CPT

## 2019-05-04 RX ORDER — FAMOTIDINE 10 MG/ML
20 INJECTION INTRAVENOUS ONCE
Qty: 0 | Refills: 0 | Status: DISCONTINUED | OUTPATIENT
Start: 2019-05-04 | End: 2019-05-09

## 2019-05-04 RX ORDER — EPINEPHRINE 0.3 MG/.3ML
0.3 INJECTION INTRAMUSCULAR; SUBCUTANEOUS
Qty: 0.3 | Refills: 0
Start: 2019-05-04 | End: 2019-05-04

## 2019-05-04 RX ORDER — DIPHENHYDRAMINE HCL 50 MG
1 CAPSULE ORAL
Qty: 16 | Refills: 0
Start: 2019-05-04 | End: 2019-05-07

## 2019-05-04 RX ORDER — AMLODIPINE BESYLATE 2.5 MG/1
1 TABLET ORAL
Qty: 7 | Refills: 0
Start: 2019-05-04 | End: 2019-05-10

## 2019-05-04 RX ORDER — DIPHENHYDRAMINE HCL 50 MG
25 CAPSULE ORAL ONCE
Qty: 0 | Refills: 0 | Status: COMPLETED | OUTPATIENT
Start: 2019-05-04 | End: 2019-05-04

## 2019-05-04 RX ADMIN — Medication 125 MILLIGRAM(S): at 09:45

## 2019-05-04 RX ADMIN — Medication 25 MILLIGRAM(S): at 09:45

## 2019-05-04 NOTE — ED STATDOCS - CLINICAL SUMMARY MEDICAL DECISION MAKING FREE TEXT BOX
PT ON ACE INHIBITOR FOR BP PRESENTS WITH SWELLING UPPER LIP UPON WAKING. NO TRAUMA. NO AIRWAY COMPROMISE ON PE. PT TREATED WITH IV STEROID AND BENADRYL. WE ARE OBSERVING HER TO MAKE SURE SHE DOES NOT PROGRESS, AND IMPROVES. WILL REASSESS.

## 2019-05-04 NOTE — ED STATDOCS - OBJECTIVE STATEMENT
BROMHEXINE, CHLORPHENIRAMINE, PHENYLEPHRINE, LISINOPRIL/HCTZ 20/12.5 40 YO FEMALE WITH CO SWOLLEN LIP. PT TOOK NEW COLD MEDICATION LAST NIGHT AND THIS MORNING SHE WOKE UP WITH A SWOLLEN UPPER LIP.SWELLING PERSISTENT DOES NOT RADIATE AND IS NOT PAINFUL.  PT HAS NOT THROAT FULLNESS OR DIFFICULTY BREATHING. MEDICATION CONTAINS BROMHEXINE, CHLORPHENIRAMINE, AND PHENYLEPHRINE, SHE IS ON LISINOPRIL/HCTZ 20/12.5 FOR HER HYPERTENSION. NO PRIOR SIMILAR EPISODES. NO DROOLING OR DIFFICULTY WITH SECRETIONS  MED HX HTN  NON SMOKER

## 2019-05-04 NOTE — ED STATDOCS - ATTENDING CONTRIBUTION TO CARE
I, Amy Phelps, performed the initial face to face bedside interview with this patient regarding history of present illness, review of symptoms and relevant past medical, social and family history.  I completed an independent physical examination.  I was the initial provider who evaluated this patient. I have signed out the follow up of any pending tests (i.e. labs, radiological studies) to the ACP.  I have communicated the patient’s plan of care and disposition with the ACP.

## 2019-05-04 NOTE — ED STATDOCS - PROGRESS NOTE DETAILS
PT evaluated by intake physician. HPI/PE/ROS as noted above. Will follow up plan per intake physician.  Will monitor PT in ED for exacerbation of symptoms. rx amlodipine, benadryl, and medrol dose pack. PT agrees to follow up with PMD on Monday, and to d/c ace inhibitor. Pt reassessed symptoms stable, Pt denies SOB, wheezing, itchiness, rash, worsening of swelling.

## 2019-05-04 NOTE — ED ADULT TRIAGE NOTE - CHIEF COMPLAINT QUOTE
Patient arrived to ED today with c/o swelling to her upper lip after taking medication last night for a cough.  Patient has no trouble breathing and is able to handle oral secretions.

## 2019-10-09 ENCOUNTER — APPOINTMENT (OUTPATIENT)
Dept: OBGYN | Facility: CLINIC | Age: 39
End: 2019-10-09
Payer: COMMERCIAL

## 2019-10-09 VITALS
BODY MASS INDEX: 24.02 KG/M2 | SYSTOLIC BLOOD PRESSURE: 140 MMHG | DIASTOLIC BLOOD PRESSURE: 97 MMHG | HEIGHT: 65 IN | HEART RATE: 64 BPM | WEIGHT: 144.19 LBS

## 2019-10-09 PROCEDURE — 99213 OFFICE O/P EST LOW 20 MIN: CPT

## 2020-02-05 ENCOUNTER — APPOINTMENT (OUTPATIENT)
Dept: OBGYN | Facility: CLINIC | Age: 40
End: 2020-02-05
Payer: COMMERCIAL

## 2020-02-05 VITALS
SYSTOLIC BLOOD PRESSURE: 135 MMHG | WEIGHT: 146.25 LBS | BODY MASS INDEX: 24.37 KG/M2 | HEIGHT: 65 IN | DIASTOLIC BLOOD PRESSURE: 90 MMHG

## 2020-02-05 PROCEDURE — 99214 OFFICE O/P EST MOD 30 MIN: CPT

## 2020-02-05 NOTE — COUNSELING
[Breast Self Exam] : breast self exam [Exercise] : exercise [Vitamins/Supplements] : vitamins/supplements [Nutrition] : nutrition [Safe Sexual Practices] : safe sexual practices [STD (testing, results, tx)] : STD (testing, results, tx)

## 2020-02-10 LAB — CYTOLOGY CVX/VAG DOC THIN PREP: NORMAL

## 2020-10-01 ENCOUNTER — APPOINTMENT (OUTPATIENT)
Dept: OBGYN | Facility: CLINIC | Age: 40
End: 2020-10-01
Payer: COMMERCIAL

## 2020-10-01 VITALS
HEART RATE: 64 BPM | SYSTOLIC BLOOD PRESSURE: 142 MMHG | WEIGHT: 139.31 LBS | DIASTOLIC BLOOD PRESSURE: 93 MMHG | BODY MASS INDEX: 23.21 KG/M2 | HEIGHT: 65 IN

## 2020-10-01 PROCEDURE — 99213 OFFICE O/P EST LOW 20 MIN: CPT

## 2020-10-22 NOTE — ED ADULT NURSE NOTE - NS ED NOTE  TALK SOMEONE YN
Notified patient had possible seizure. I came and evaluated the patient. She reports that she was shaking all over. She reports that she was awake during this episode. Per staff she was having tonic-clonic motion more so on the left side than right. She denies biting her tongue. She denies having episodes like this in the past.  She denies any confusion now. Blood sugar is 337. Plan  Seizure precautions - ordered on admission  D/w Neurosurgery - ordered keppra, c/w decadron. Order MRI brain, CT abd to complete workup. Hold anti platelet agents. Requested medical records from [facility] [place] daughter evangelina - no answer. VM full. No

## 2020-12-08 ENCOUNTER — INPATIENT (INPATIENT)
Facility: HOSPITAL | Age: 40
LOS: 1 days | Discharge: HOME | DRG: 916 | End: 2020-12-10
Attending: HOSPITALIST | Admitting: INTERNAL MEDICINE
Payer: COMMERCIAL

## 2020-12-08 VITALS
TEMPERATURE: 98 F | OXYGEN SATURATION: 99 % | RESPIRATION RATE: 16 BRPM | HEART RATE: 75 BPM | DIASTOLIC BLOOD PRESSURE: 80 MMHG | SYSTOLIC BLOOD PRESSURE: 120 MMHG | HEIGHT: 65 IN | WEIGHT: 134.04 LBS

## 2020-12-08 DIAGNOSIS — T78.3XXA ANGIONEUROTIC EDEMA, INITIAL ENCOUNTER: ICD-10-CM

## 2020-12-08 LAB
ABO RH CONFIRMATION: SIGNIFICANT CHANGE UP
ANION GAP SERPL CALC-SCNC: 10 MMOL/L — SIGNIFICANT CHANGE UP (ref 5–17)
BASOPHILS # BLD AUTO: 0.05 K/UL — SIGNIFICANT CHANGE UP (ref 0–0.2)
BASOPHILS NFR BLD AUTO: 0.5 % — SIGNIFICANT CHANGE UP (ref 0–2)
BLD GP AB SCN SERPL QL: SIGNIFICANT CHANGE UP
BUN SERPL-MCNC: 10 MG/DL — SIGNIFICANT CHANGE UP (ref 8–20)
CALCIUM SERPL-MCNC: 8.5 MG/DL — LOW (ref 8.6–10.2)
CHLORIDE SERPL-SCNC: 101 MMOL/L — SIGNIFICANT CHANGE UP (ref 98–107)
CO2 SERPL-SCNC: 25 MMOL/L — SIGNIFICANT CHANGE UP (ref 22–29)
CREAT SERPL-MCNC: 0.73 MG/DL — SIGNIFICANT CHANGE UP (ref 0.5–1.3)
EOSINOPHIL # BLD AUTO: 0.2 K/UL — SIGNIFICANT CHANGE UP (ref 0–0.5)
EOSINOPHIL NFR BLD AUTO: 2 % — SIGNIFICANT CHANGE UP (ref 0–6)
GLUCOSE SERPL-MCNC: 96 MG/DL — SIGNIFICANT CHANGE UP (ref 70–99)
HCT VFR BLD CALC: 37.7 % — SIGNIFICANT CHANGE UP (ref 34.5–45)
HGB BLD-MCNC: 13.1 G/DL — SIGNIFICANT CHANGE UP (ref 11.5–15.5)
IMM GRANULOCYTES NFR BLD AUTO: 0.3 % — SIGNIFICANT CHANGE UP (ref 0–1.5)
LYMPHOCYTES # BLD AUTO: 3.24 K/UL — SIGNIFICANT CHANGE UP (ref 1–3.3)
LYMPHOCYTES # BLD AUTO: 31.9 % — SIGNIFICANT CHANGE UP (ref 13–44)
MCHC RBC-ENTMCNC: 31.5 PG — SIGNIFICANT CHANGE UP (ref 27–34)
MCHC RBC-ENTMCNC: 34.7 GM/DL — SIGNIFICANT CHANGE UP (ref 32–36)
MCV RBC AUTO: 90.6 FL — SIGNIFICANT CHANGE UP (ref 80–100)
MONOCYTES # BLD AUTO: 0.4 K/UL — SIGNIFICANT CHANGE UP (ref 0–0.9)
MONOCYTES NFR BLD AUTO: 3.9 % — SIGNIFICANT CHANGE UP (ref 2–14)
NEUTROPHILS # BLD AUTO: 6.25 K/UL — SIGNIFICANT CHANGE UP (ref 1.8–7.4)
NEUTROPHILS NFR BLD AUTO: 61.4 % — SIGNIFICANT CHANGE UP (ref 43–77)
PLATELET # BLD AUTO: 287 K/UL — SIGNIFICANT CHANGE UP (ref 150–400)
POTASSIUM SERPL-MCNC: 3.5 MMOL/L — SIGNIFICANT CHANGE UP (ref 3.5–5.3)
POTASSIUM SERPL-SCNC: 3.5 MMOL/L — SIGNIFICANT CHANGE UP (ref 3.5–5.3)
RAPID RVP RESULT: SIGNIFICANT CHANGE UP
RBC # BLD: 4.16 M/UL — SIGNIFICANT CHANGE UP (ref 3.8–5.2)
RBC # FLD: 13.4 % — SIGNIFICANT CHANGE UP (ref 10.3–14.5)
SODIUM SERPL-SCNC: 136 MMOL/L — SIGNIFICANT CHANGE UP (ref 135–145)
WBC # BLD: 10.17 K/UL — SIGNIFICANT CHANGE UP (ref 3.8–10.5)
WBC # FLD AUTO: 10.17 K/UL — SIGNIFICANT CHANGE UP (ref 3.8–10.5)

## 2020-12-08 PROCEDURE — 99285 EMERGENCY DEPT VISIT HI MDM: CPT

## 2020-12-08 PROCEDURE — 99291 CRITICAL CARE FIRST HOUR: CPT

## 2020-12-08 RX ORDER — ALBUTEROL 90 UG/1
2.5 AEROSOL, METERED ORAL EVERY 6 HOURS
Refills: 0 | Status: ACTIVE | OUTPATIENT
Start: 2020-12-08 | End: 2021-11-06

## 2020-12-08 RX ORDER — DIPHENHYDRAMINE HCL 50 MG
50 CAPSULE ORAL ONCE
Refills: 0 | Status: COMPLETED | OUTPATIENT
Start: 2020-12-08 | End: 2020-12-08

## 2020-12-08 RX ORDER — INFLUENZA VIRUS VACCINE 15; 15; 15; 15 UG/.5ML; UG/.5ML; UG/.5ML; UG/.5ML
0.5 SUSPENSION INTRAMUSCULAR ONCE
Refills: 0 | Status: ACTIVE | OUTPATIENT
Start: 2020-12-08 | End: 2021-11-06

## 2020-12-08 RX ORDER — FAMOTIDINE 10 MG/ML
20 INJECTION INTRAVENOUS EVERY 12 HOURS
Refills: 0 | Status: ACTIVE | OUTPATIENT
Start: 2020-12-08 | End: 2020-12-11

## 2020-12-08 RX ORDER — TRANEXAMIC ACID 100 MG/ML
1000 INJECTION, SOLUTION INTRAVENOUS ONCE
Refills: 0 | Status: COMPLETED | OUTPATIENT
Start: 2020-12-08 | End: 2020-12-08

## 2020-12-08 RX ORDER — FAMOTIDINE 10 MG/ML
20 INJECTION INTRAVENOUS ONCE
Refills: 0 | Status: COMPLETED | OUTPATIENT
Start: 2020-12-08 | End: 2020-12-08

## 2020-12-08 RX ORDER — DIPHENHYDRAMINE HCL 50 MG
50 CAPSULE ORAL EVERY 6 HOURS
Refills: 0 | Status: DISCONTINUED | OUTPATIENT
Start: 2020-12-08 | End: 2020-12-09

## 2020-12-08 RX ORDER — DEXAMETHASONE 0.5 MG/5ML
10 ELIXIR ORAL EVERY 6 HOURS
Refills: 0 | Status: DISCONTINUED | OUTPATIENT
Start: 2020-12-08 | End: 2020-12-09

## 2020-12-08 RX ORDER — EPINEPHRINE 0.3 MG/.3ML
0.3 INJECTION INTRAMUSCULAR; SUBCUTANEOUS ONCE
Refills: 0 | Status: COMPLETED | OUTPATIENT
Start: 2020-12-08 | End: 2020-12-08

## 2020-12-08 RX ORDER — DEXAMETHASONE 0.5 MG/5ML
10 ELIXIR ORAL ONCE
Refills: 0 | Status: COMPLETED | OUTPATIENT
Start: 2020-12-08 | End: 2020-12-08

## 2020-12-08 RX ADMIN — EPINEPHRINE 0.3 MILLIGRAM(S): 0.3 INJECTION INTRAMUSCULAR; SUBCUTANEOUS at 17:38

## 2020-12-08 RX ADMIN — FAMOTIDINE 20 MILLIGRAM(S): 10 INJECTION INTRAVENOUS at 18:30

## 2020-12-08 RX ADMIN — Medication 102 MILLIGRAM(S): at 16:16

## 2020-12-08 RX ADMIN — Medication 50 MILLIGRAM(S): at 16:16

## 2020-12-08 RX ADMIN — TRANEXAMIC ACID 220 MILLIGRAM(S): 100 INJECTION, SOLUTION INTRAVENOUS at 14:33

## 2020-12-08 RX ADMIN — Medication 50 MILLIGRAM(S): at 23:11

## 2020-12-08 RX ADMIN — Medication 10 MILLIGRAM(S): at 23:11

## 2020-12-08 NOTE — ED PROVIDER NOTE - CLINICAL SUMMARY MEDICAL DECISION MAKING FREE TEXT BOX
Pt presenting with lisinopril induced angioedema, not in respiratory distress. Will check labs, treat w txa and reeval.

## 2020-12-08 NOTE — ED ADULT NURSE REASSESSMENT NOTE - NS ED NURSE REASSESS COMMENT FT1
pt receiving first Unit of FFP as per MD order, pt tolerating infusion well, no adverse reaction noted, breathing easy and unlabored, no drooling noted , VSS, will continue to monitor
report given to regis CARVER in MICU; awaiting transport
p aox4, reps even unlabored, no change in edema to upper lip. pt able to speak in full sentences, no drool or resp distress noted. will continue to monitor.

## 2020-12-08 NOTE — ED PROVIDER NOTE - ATTENDING CONTRIBUTION TO CARE
Pro: I performed a face to face bedside interview with patient regarding history of present illness, review of symptoms and past medical history. I completed an independent physical exam.  I have discussed patient's plan of care with resident.   I agree with note as stated above including HISTORY OF PRESENT ILLNESS, HIV, PAST MEDICAL/SURGICAL/FAMILY/SOCIAL HISTORY, ALLERGIES AND HOME MEDICATIONS, REVIEW OF SYSTEMS, PHYSICAL EXAM, MEDICAL DECISION MAKING and any PROGRESS NOTES during the time I functioned as the attending physician for this patient unless otherwise noted. My brief assessment is as follows: Patient with one prior episode of ACE-I Angioedema, ran out of amlodipine so began taking her lisinopril again intermittently when her BP is high, p/w R upper lip swelling. No additional new exposures. No rash, nausea, vomiting. Exam notable for isolated R sided upper lip swelling, no uvular or tongue swelling. No stridor. Lungs CTAB. Plan for txa, if innefective will give FFP. Reassess

## 2020-12-08 NOTE — CHART NOTE - NSCHARTNOTEFT_GEN_A_CORE
Pt here for airway watch, contacted ENT for consultation and SICU aware of pt. ED resident contacted anesthesia, service aware of patient.

## 2020-12-08 NOTE — ED PROVIDER NOTE - OBJECTIVE STATEMENT
39 y/o F pt with hx of htn presenting today with c/o R lip swelling with onset approx 1 hour pta. Pt takes lisinopril for htn, states that in the past a similar rxn occurred where her R lip swelled up after taking medication. Did not stop taking medications. Denies allergies, did not take any other medications today. Denies difficulty breathing, throat swelling. Pt denies any chest pain, dyspnea, fevers, n/v/d, abdominal pain, dysuria, headache, cough, congestion, sore throat, neck pain, back pain, weakness, numbness, tingling, dizziness, syncope, or other complaint.

## 2020-12-08 NOTE — ED PROVIDER NOTE - PROGRESS NOTE DETAILS
Lip swelling appears to be worsening. Pt continues to deny difficulty breathing, no tongue swelling. Will order benadryl, steroids, ffp, and continue to closely monitor. - Kong Hernandez, PGY-2 Labial swelling appearing to continue to worsen. Pt given decadron and benadryl, still awaiting ffp. MICU consulted. - Kong Hernandez, PGY-2

## 2020-12-08 NOTE — H&P ADULT - ASSESSMENT
40F w/ PMHx HTN, asthma admitted on 12/08 w/ facial swelling    Angioedema sec to ACEI    Neuro: Mentation at baseline. Avoid sedation   Allergy: IM epinephrine and FFP pending. Started on ATC IV steroids, IV Pepcid and Benadryl. Consulted anesthesiology surgery and ENT. Add ACEI into allergy list  Cardiovascular: Aim for MAP target 65. No active issues  Resp/Chest: Currently w/ no stridor or wheeze. Maintain SpO2 > 92%. Currently oxygenation well on 2L n/c. EtCO2 monitoring. PRN albuterol  GI/Nutrition: Diet: Keep NPO for now. IV Pepcid  ID: No active issues  Renal: Avoid nephrotoxic agents. Strict I&O  Endocrinology: Maintain Blood sugar < 180. ISS as per protocol  Haem/Oncology:  DVT ppx w/ SCDs  Code status: Full    Critical Care time: 35 minutes assessing presenting problems of acute illness that poses high probability of life threatening deterioration or end organ damage/dysfunction.  Medical decision making including Initiating plan of care, reviewing data, reviewing radiology, discussing with multidisciplinary team, non inclusive of procedures

## 2020-12-08 NOTE — H&P ADULT - NSHPPHYSICALEXAM_GEN_ALL_CORE
General: No acute distress.    HEENT: Pupils equal, reactive to light.  Symmetric.  PULM: Clear to auscultation bilaterally, no significant sputum production  NECK: Supple, no lymphadenopathy, trachea midline  CVS: Regular rate and rhythm, no murmurs, rubs, or gallops  ABD: Soft, nondistended, nontender, normoactive bowel sounds, no masses  EXT: No edema, nontender  SKIN: Warm and well perfused, no rashes noted.  NEURO: Alert, oriented, interactive, nonfocal

## 2020-12-08 NOTE — H&P ADULT - NSHPREVIEWOFSYSTEMS_GEN_ALL_CORE
CONSTITUTIONAL: No fever, chills, or fatigue  EYES: No eye pain, visual disturbances, or discharge  ENMT:  No difficulty hearing, tinnitus, vertigo; No sinus or throat pain. Facial swelling  NECK: No pain or stiffness  RESPIRATORY: No cough, wheezing, chills or hemoptysis; No shortness of breath  CARDIOVASCULAR: No chest pain, palpitations, dizziness, or leg swelling  GASTROINTESTINAL: No abdominal or epigastric pain. No nausea, vomiting, or hematemesis; No diarrhea or constipation. No melena or hematochezia.  GENITOURINARY: No dysuria, frequency, hematuria, or incontinence  NEUROLOGICAL: No headaches, memory loss, loss of strength, numbness, or tremors  SKIN: No itching, burning, rashes, or lesions   MUSCULOSKELETAL: No joint pain or swelling; No muscle, back, or extremity pain  PSYCHIATRIC: No depression, anxiety, mood swings, or difficulty sleeping

## 2020-12-08 NOTE — H&P ADULT - HISTORY OF PRESENT ILLNESS
40F w/ PMHx HTN, asthma admitted on 12/08 w/ facial swelling. Pt had a similar allergic reaction to lisinopril in 05/2019 but continued to take lisinopril on and off. Last took lisinopril this morning and also took one tablet last night. She does not c/o SOB, wheezing or stridor. No drooling. Has a good cough. Facial swelling got worse after she presented to the hospital. Given IV steroids, benadryl in the ED. IM epinephrine and FFP pending

## 2020-12-08 NOTE — ED ADULT TRIAGE NOTE - CHIEF COMPLAINT QUOTE
Pt started with right sided facial swelling and upper lip swelling today. Pt takes Lisinopril for HTN. Denies any tongue swelling or SOB.

## 2020-12-08 NOTE — ED PROVIDER NOTE - PHYSICAL EXAMINATION
Gen: no acute distress  Head: normocephalic, atraumatic  ENT: edema to R upper lip and R facial area, no edema to oropharynx or tongue  Lung: CTAB, no respiratory distress, no wheezing, rales, rhonchi  CV: normal s1/s2, rrr,   Abd: soft, non-tender, non-distended  MSK: No edema, no visible deformities, full range of motion in all 4 extremities  Neuro: No focal neurologic deficits  Skin: No rash   Psych: normal affect

## 2020-12-09 LAB
ACANTHOCYTES BLD QL SMEAR: SLIGHT — SIGNIFICANT CHANGE UP
ALBUMIN SERPL ELPH-MCNC: 4.2 G/DL — SIGNIFICANT CHANGE UP (ref 3.3–5.2)
ALP SERPL-CCNC: 41 U/L — SIGNIFICANT CHANGE UP (ref 40–120)
ALT FLD-CCNC: 13 U/L — SIGNIFICANT CHANGE UP
ANION GAP SERPL CALC-SCNC: 14 MMOL/L — SIGNIFICANT CHANGE UP (ref 5–17)
ANISOCYTOSIS BLD QL: SLIGHT — SIGNIFICANT CHANGE UP
AST SERPL-CCNC: 17 U/L — SIGNIFICANT CHANGE UP
BASOPHILS # BLD AUTO: 0 K/UL — SIGNIFICANT CHANGE UP (ref 0–0.2)
BASOPHILS NFR BLD AUTO: 0 % — SIGNIFICANT CHANGE UP (ref 0–2)
BILIRUB SERPL-MCNC: 0.5 MG/DL — SIGNIFICANT CHANGE UP (ref 0.4–2)
BUN SERPL-MCNC: 12 MG/DL — SIGNIFICANT CHANGE UP (ref 8–20)
BURR CELLS BLD QL SMEAR: PRESENT — SIGNIFICANT CHANGE UP
CALCIUM SERPL-MCNC: 9.2 MG/DL — SIGNIFICANT CHANGE UP (ref 8.6–10.2)
CHLORIDE SERPL-SCNC: 105 MMOL/L — SIGNIFICANT CHANGE UP (ref 98–107)
CO2 SERPL-SCNC: 20 MMOL/L — LOW (ref 22–29)
CREAT SERPL-MCNC: 0.65 MG/DL — SIGNIFICANT CHANGE UP (ref 0.5–1.3)
ELLIPTOCYTES BLD QL SMEAR: SLIGHT — SIGNIFICANT CHANGE UP
EOSINOPHIL # BLD AUTO: 0 K/UL — SIGNIFICANT CHANGE UP (ref 0–0.5)
EOSINOPHIL NFR BLD AUTO: 0 % — SIGNIFICANT CHANGE UP (ref 0–6)
GLUCOSE SERPL-MCNC: 128 MG/DL — HIGH (ref 70–99)
HCT VFR BLD CALC: 37.9 % — SIGNIFICANT CHANGE UP (ref 34.5–45)
HGB BLD-MCNC: 13.5 G/DL — SIGNIFICANT CHANGE UP (ref 11.5–15.5)
LYMPHOCYTES # BLD AUTO: 0.76 K/UL — LOW (ref 1–3.3)
LYMPHOCYTES # BLD AUTO: 9.5 % — LOW (ref 13–44)
MACROCYTES BLD QL: SLIGHT — SIGNIFICANT CHANGE UP
MAGNESIUM SERPL-MCNC: 2 MG/DL — SIGNIFICANT CHANGE UP (ref 1.8–2.6)
MANUAL SMEAR VERIFICATION: SIGNIFICANT CHANGE UP
MCHC RBC-ENTMCNC: 31.8 PG — SIGNIFICANT CHANGE UP (ref 27–34)
MCHC RBC-ENTMCNC: 35.6 GM/DL — SIGNIFICANT CHANGE UP (ref 32–36)
MCV RBC AUTO: 89.2 FL — SIGNIFICANT CHANGE UP (ref 80–100)
MICROCYTES BLD QL: SLIGHT — SIGNIFICANT CHANGE UP
MONOCYTES # BLD AUTO: 0 K/UL — SIGNIFICANT CHANGE UP (ref 0–0.9)
MONOCYTES NFR BLD AUTO: 0 % — LOW (ref 2–14)
NEUTROPHILS # BLD AUTO: 7.16 K/UL — SIGNIFICANT CHANGE UP (ref 1.8–7.4)
NEUTROPHILS NFR BLD AUTO: 89.6 % — HIGH (ref 43–77)
PHOSPHATE SERPL-MCNC: 3.9 MG/DL — SIGNIFICANT CHANGE UP (ref 2.4–4.7)
PLAT MORPH BLD: NORMAL — SIGNIFICANT CHANGE UP
PLATELET # BLD AUTO: 272 K/UL — SIGNIFICANT CHANGE UP (ref 150–400)
POIKILOCYTOSIS BLD QL AUTO: SLIGHT — SIGNIFICANT CHANGE UP
POLYCHROMASIA BLD QL SMEAR: SLIGHT — SIGNIFICANT CHANGE UP
POTASSIUM SERPL-MCNC: 3.5 MMOL/L — SIGNIFICANT CHANGE UP (ref 3.5–5.3)
POTASSIUM SERPL-SCNC: 3.5 MMOL/L — SIGNIFICANT CHANGE UP (ref 3.5–5.3)
PROT SERPL-MCNC: 7.6 G/DL — SIGNIFICANT CHANGE UP (ref 6.6–8.7)
RBC # BLD: 4.25 M/UL — SIGNIFICANT CHANGE UP (ref 3.8–5.2)
RBC # FLD: 13.1 % — SIGNIFICANT CHANGE UP (ref 10.3–14.5)
RBC BLD AUTO: ABNORMAL
SCHISTOCYTES BLD QL AUTO: SLIGHT — SIGNIFICANT CHANGE UP
SODIUM SERPL-SCNC: 139 MMOL/L — SIGNIFICANT CHANGE UP (ref 135–145)
VARIANT LYMPHS # BLD: 0.9 % — SIGNIFICANT CHANGE UP (ref 0–6)
WBC # BLD: 7.99 K/UL — SIGNIFICANT CHANGE UP (ref 3.8–10.5)
WBC # FLD AUTO: 7.99 K/UL — SIGNIFICANT CHANGE UP (ref 3.8–10.5)

## 2020-12-09 PROCEDURE — 99233 SBSQ HOSP IP/OBS HIGH 50: CPT

## 2020-12-09 RX ORDER — SODIUM CHLORIDE 9 MG/ML
1000 INJECTION, SOLUTION INTRAVENOUS
Refills: 0 | Status: ACTIVE | OUTPATIENT
Start: 2020-12-09 | End: 2020-12-10

## 2020-12-09 RX ORDER — POTASSIUM CHLORIDE 20 MEQ
20 PACKET (EA) ORAL ONCE
Refills: 0 | Status: COMPLETED | OUTPATIENT
Start: 2020-12-09 | End: 2020-12-09

## 2020-12-09 RX ORDER — ACETAMINOPHEN 500 MG
650 TABLET ORAL EVERY 6 HOURS
Refills: 0 | Status: ACTIVE | OUTPATIENT
Start: 2020-12-09 | End: 2021-11-07

## 2020-12-09 RX ORDER — DIPHENHYDRAMINE HCL 50 MG
25 CAPSULE ORAL EVERY 6 HOURS
Refills: 0 | Status: DISCONTINUED | OUTPATIENT
Start: 2020-12-09 | End: 2020-12-09

## 2020-12-09 RX ORDER — POTASSIUM CHLORIDE 20 MEQ
20 PACKET (EA) ORAL ONCE
Refills: 0 | Status: DISCONTINUED | OUTPATIENT
Start: 2020-12-09 | End: 2020-12-09

## 2020-12-09 RX ORDER — ONDANSETRON 8 MG/1
4 TABLET, FILM COATED ORAL ONCE
Refills: 0 | Status: COMPLETED | OUTPATIENT
Start: 2020-12-09 | End: 2020-12-09

## 2020-12-09 RX ORDER — DIPHENHYDRAMINE HCL 50 MG
25 CAPSULE ORAL EVERY 6 HOURS
Refills: 0 | Status: ACTIVE | OUTPATIENT
Start: 2020-12-09 | End: 2021-11-07

## 2020-12-09 RX ORDER — ENOXAPARIN SODIUM 100 MG/ML
40 INJECTION SUBCUTANEOUS DAILY
Refills: 0 | Status: ACTIVE | OUTPATIENT
Start: 2020-12-09 | End: 2021-11-07

## 2020-12-09 RX ORDER — POTASSIUM CHLORIDE 20 MEQ
40 PACKET (EA) ORAL ONCE
Refills: 0 | Status: COMPLETED | OUTPATIENT
Start: 2020-12-09 | End: 2020-12-09

## 2020-12-09 RX ORDER — DEXAMETHASONE 0.5 MG/5ML
4 ELIXIR ORAL DAILY
Refills: 0 | Status: DISCONTINUED | OUTPATIENT
Start: 2020-12-09 | End: 2020-12-09

## 2020-12-09 RX ORDER — DEXAMETHASONE 0.5 MG/5ML
4 ELIXIR ORAL DAILY
Refills: 0 | Status: ACTIVE | OUTPATIENT
Start: 2020-12-10 | End: 2021-11-08

## 2020-12-09 RX ORDER — DEXAMETHASONE 0.5 MG/5ML
20 ELIXIR ORAL ONCE
Refills: 0 | Status: COMPLETED | OUTPATIENT
Start: 2020-12-09 | End: 2020-12-09

## 2020-12-09 RX ADMIN — FAMOTIDINE 20 MILLIGRAM(S): 10 INJECTION INTRAVENOUS at 16:45

## 2020-12-09 RX ADMIN — Medication 40 MILLIEQUIVALENT(S): at 11:15

## 2020-12-09 RX ADMIN — Medication 10 MILLIGRAM(S): at 04:38

## 2020-12-09 RX ADMIN — ONDANSETRON 4 MILLIGRAM(S): 8 TABLET, FILM COATED ORAL at 06:11

## 2020-12-09 RX ADMIN — FAMOTIDINE 20 MILLIGRAM(S): 10 INJECTION INTRAVENOUS at 04:39

## 2020-12-09 RX ADMIN — Medication 20 MILLIEQUIVALENT(S): at 20:53

## 2020-12-09 RX ADMIN — ENOXAPARIN SODIUM 40 MILLIGRAM(S): 100 INJECTION SUBCUTANEOUS at 11:16

## 2020-12-09 RX ADMIN — SODIUM CHLORIDE 100 MILLILITER(S): 9 INJECTION, SOLUTION INTRAVENOUS at 18:04

## 2020-12-09 RX ADMIN — Medication 50 MILLIGRAM(S): at 04:38

## 2020-12-09 RX ADMIN — Medication 110 MILLIGRAM(S): at 12:04

## 2020-12-09 NOTE — PROGRESS NOTE ADULT - ASSESSMENT
Pt is a 40 y.o. F PMH HTN and asthma admitted on 12/08 w/ facial swelling likely secondary to lisinopril use.    1) Angioedema sec to ACEI  treated with iv steroid , pepcid benadryl   cont benadryl, decadron   pepcid   allergy immunology called   pt is instructed to not use ace inh again   will call PCP in am     2- HTN   controlled   start new antihypertensive agent as needed

## 2020-12-09 NOTE — PROGRESS NOTE ADULT - ASSESSMENT
Pt is a 40 y.o. F PMH HTN and asthma admitted on 12/08 w/ facial swelling likely secondary to lisinopril use.    1) Angioedema sec to ACEI    Neuro: Mentation at baseline. Avoid sedation   Allergy: IM epinephrine, FFP pending, benadryl/pepcid given yesterday. Has been on ATC IV steroids, IV Pepcid. Spoke with ENT attending Dr. Elizabeth, recommends 20 mg Decadron with lower dosing tomorrow  Cardiovascular: Aim for MAP target 65. No active issues  Resp/Chest: Currently w/ no stridor or wheeze. Maintain SpO2 > 92%. Currently oxygenation well on 2L n/c. EtCO2 monitoring. PRN albuterol  GI/Nutrition: Diet: Regular diet started, pt tolerating without any issues. IV Pepcid.  ID: No active issues  Renal: Avoid nephrotoxic agents. Strict I&O  Endocrinology: Maintain Blood sugar < 180. ISS as per protocol  Haem/Oncology:  DVT ppx w/ SCDs  Code status: Full Code    Pt endorsed to Dr. Ramos and downgraded to  bed.

## 2020-12-09 NOTE — PROGRESS NOTE ADULT - SUBJECTIVE AND OBJECTIVE BOX
Patient is a 40y old  Female who presents with a chief complaint of Angioedema   treated in ICU , now stable tx to medicine service   pt is c/o headache , mild lip swelling on the right   no SOB no CP       HPI:      40F w/ PMHx HTN, asthma admitted on 12/08 w/ facial swelling. Pt had a similar allergic reaction to lisinopril in 05/2019 but continued to take lisinopril on and off. Last took lisinopril this morning and also took one tablet last night. She does not c/o SOB, wheezing or stridor. No drooling. Has a good cough. Facial swelling got worse after she presented to the hospital. Given IV steroids, benadryl in the ED. IM epinephrine and FFP pending     (08 Dec 2020 17:38)      Allergies    ACE inhibitors (Angioedema)  penicillin (Rash)    Intolerances        REVIEW OF SYSTEMS:    CONSTITUTIONAL: No fever, weight loss, or fatigue  EYES: No eye pain, visual disturbances, or discharge  ENMT:  No difficulty hearing, tinnitus, vertigo; No sinus or throat pain  NECK: No pain or stiffness  BREASTS: No pain, masses, or nipple discharge  RESPIRATORY: No cough, wheezing, chills or hemoptysis; No shortness of breath  CARDIOVASCULAR: No chest pain, palpitations, dizziness, or leg swelling  GASTROINTESTINAL: No abdominal or epigastric pain. No nausea, vomiting, or hematemesis; No diarrhea or constipation. No melena or hematochezia.  GENITOURINARY: No dysuria, frequency, hematuria, or incontinence  NEUROLOGICAL: No headaches, memory loss, loss of strength, numbness, or tremors  SKIN: No itching, burning, rashes, or lesions   LYMPH NODES: No enlarged glands  ENDOCRINE: No heat or cold intolerance; No hair loss  MUSCULOSKELETAL: No joint pain or swelling; No muscle, back, or extremity pain  PSYCHIATRIC: No depression, anxiety, mood swings, or difficulty sleeping  HEME/LYMPH: No easy bruising, or bleeding gums  ALLERY AND IMMUNOLOGIC: No hives or eczema      Vital Signs Last 24 Hrs  T(C): 37 (09 Dec 2020 15:48), Max: 37.2 (08 Dec 2020 19:37)  T(F): 98.6 (09 Dec 2020 15:48), Max: 98.9 (08 Dec 2020 19:37)  HR: 65 (09 Dec 2020 16:00) (60 - 92)  BP: 116/73 (09 Dec 2020 16:00) (96/70 - 143/92)  BP(mean): 88 (09 Dec 2020 16:00) (81 - 112)  RR: 16 (09 Dec 2020 16:00) (13 - 23)  SpO2: 100% (09 Dec 2020 16:00) (96% - 100%)    PHYSICAL EXAM:    GENERAL: NAD, well-groomed, well-developed  HEAD:  Atraumatic, Normocephalic  EYES: EOMI, PERRLA, conjunctiva and sclera clear  ENMT: No tonsillar erythema, exudates, or enlargement; lip upper on the right  mild swelling Moist mucous membranes  NECK: Supple, No JVD, Normal thyroid  NERVOUS SYSTEM:  Alert & Oriented X3, Good concentration; no motor deficits  CHEST/LUNG: Clear to auscultation  bilaterally; No rales, rhonchi  HEART: Regular rate and rhythm; s1 /s2   ABDOMEN: Soft, Nontender, Nondistended; Bowel sounds present  EXTREMITIES:  2+ Peripheral Pulses, No clubbing, cyanosis, or edema      LABS:                        13.5   7.99  )-----------( 272      ( 09 Dec 2020 05:49 )             37.9     12-09    139  |  105  |  12.0  ----------------------------<  128<H>  3.5   |  20.0<L>  |  0.65    Ca    9.2      09 Dec 2020 05:49  Phos  3.9     12-09  Mg     2.0     12-09    TPro  7.6  /  Alb  4.2  /  TBili  0.5  /  DBili  x   /  AST  17  /  ALT  13  /  AlkPhos  41  12-09          RADIOLOGY & ADDITIONAL TESTS:

## 2020-12-09 NOTE — PROGRESS NOTE ADULT - SUBJECTIVE AND OBJECTIVE BOX
S: no acute events overnight. Pt examined and seen at the bedside with MICU team and attending Dr. Aguilar.    Patient is a 40y old  Female who presents with a chief complaint of Angioedema (08 Dec 2020 17:38)      PAST MEDICAL & SURGICAL HISTORY:  Asthma    High blood pressure     delivery delivered          Medications:      ALBUTerol    0.083% 2.5 milliGRAM(s) Nebulizer every 6 hours PRN  diphenhydrAMINE   Injectable 25 milliGRAM(s) IV Push every 6 hours PRN        enoxaparin Injectable 40 milliGRAM(s) SubCutaneous daily    famotidine Injectable 20 milliGRAM(s) IV Push every 12 hours          influenza   Vaccine 0.5 milliLiter(s) IntraMuscular once              ICU Vital Signs Last 24 Hrs  T(C): 37 (09 Dec 2020 15:48), Max: 37.2 (08 Dec 2020 19:37)  T(F): 98.6 (09 Dec 2020 15:48), Max: 98.9 (08 Dec 2020 19:37)  HR: 78 (09 Dec 2020 14:00) (60 - 92)  BP: 119/84 (09 Dec 2020 13:00) (96/70 - 143/92)  BP(mean): 96 (09 Dec 2020 13:00) (81 - 112)  ABP: --  ABP(mean): --  RR: 22 (09 Dec 2020 14:00) (13 - 23)  SpO2: 98% (09 Dec 2020 14:00) (96% - 100%)          I&O's Detail    08 Dec 2020 07:01  -  09 Dec 2020 07:00  --------------------------------------------------------  IN:  Total IN: 0 mL    OUT:    Voided (mL): 200 mL  Total OUT: 200 mL    Total NET: -200 mL      09 Dec 2020 07:01  -  09 Dec 2020 16:13  --------------------------------------------------------  IN:    IV PiggyBack: 100 mL    Oral Fluid: 960 mL  Total IN: 1060 mL    OUT:    Voided (mL): 500 mL  Total OUT: 500 mL    Total NET: 560 mL            LABS:                        13.5   7.99  )-----------( 272      ( 09 Dec 2020 05:49 )             37.9         139  |  105  |  12.0  ----------------------------<  128<H>  3.5   |  20.0<L>  |  0.65    Ca    9.2      09 Dec 2020 05:49  Phos  3.9       Mg     2.0         TPro  7.6  /  Alb  4.2  /  TBili  0.5  /  DBili  x   /  AST  17  /  ALT  13  /  AlkPhos  41            CAPILLARY BLOOD GLUCOSE            CULTURES:  Rapid RVP Result: NotDetec ( @ 17:35)      Physical Examination:    General: No acute distress.      HEENT: Pupils equal, reactive to light.  Symmetric. No oropharyngeal edema.    Face: Mild upper lip edema, improved from yesterday    PULM: Clear to auscultation bilaterally, no significant sputum production    NECK: Supple, no lymphadenopathy, trachea midline    CVS: Regular rate and rhythm, no murmurs, rubs, or gallops    ABD: Soft, nondistended, nontender, normoactive bowel sounds, no masses    EXT: No edema, nontender    SKIN: Warm and well perfused, no rashes noted.    NEURO: Alert, oriented, interactive, nonfocal    DEVICES: PIV    RADIOLOGY: reviewed S: no acute events overnight. Pt examined and seen at the bedside with MICU team and attending Dr. Aguilar.    Patient is a 40y old  Female who presents with a chief complaint of Angioedema (08 Dec 2020 17:38)      PAST MEDICAL & SURGICAL HISTORY:  Asthma    High blood pressure     delivery delivered          Medications:      ALBUTerol    0.083% 2.5 milliGRAM(s) Nebulizer every 6 hours PRN  diphenhydrAMINE   Injectable 25 milliGRAM(s) IV Push every 6 hours PRN        enoxaparin Injectable 40 milliGRAM(s) SubCutaneous daily    famotidine Injectable 20 milliGRAM(s) IV Push every 12 hours          influenza   Vaccine 0.5 milliLiter(s) IntraMuscular once              ICU Vital Signs Last 24 Hrs  T(C): 37 (09 Dec 2020 15:48), Max: 37.2 (08 Dec 2020 19:37)  T(F): 98.6 (09 Dec 2020 15:48), Max: 98.9 (08 Dec 2020 19:37)  HR: 78 (09 Dec 2020 14:00) (60 - 92)  BP: 119/84 (09 Dec 2020 13:00) (96/70 - 143/92)  BP(mean): 96 (09 Dec 2020 13:00) (81 - 112)  ABP: --  ABP(mean): --  RR: 22 (09 Dec 2020 14:00) (13 - 23)  SpO2: 98% (09 Dec 2020 14:00) (96% - 100%)          I&O's Detail    08 Dec 2020 07:01  -  09 Dec 2020 07:00  --------------------------------------------------------  IN:  Total IN: 0 mL    OUT:    Voided (mL): 200 mL  Total OUT: 200 mL    Total NET: -200 mL      09 Dec 2020 07:01  -  09 Dec 2020 16:13  --------------------------------------------------------  IN:    IV PiggyBack: 100 mL    Oral Fluid: 960 mL  Total IN: 1060 mL    OUT:    Voided (mL): 500 mL  Total OUT: 500 mL    Total NET: 560 mL            LABS:                        13.5   7.99  )-----------( 272      ( 09 Dec 2020 05:49 )             37.9         139  |  105  |  12.0  ----------------------------<  128<H>  3.5   |  20.0<L>  |  0.65    Ca    9.2      09 Dec 2020 05:49  Phos  3.9       Mg     2.0         TPro  7.6  /  Alb  4.2  /  TBili  0.5  /  DBili  x   /  AST  17  /  ALT  13  /  AlkPhos  41            CAPILLARY BLOOD GLUCOSE            CULTURES:  Rapid RVP Result: NotDetec ( @ 17:35)      Physical Examination:    General: No acute distress.      HEENT: Pupils equal, reactive to light.  Symmetric. No oropharyngeal edema.     Face: Mild upper lip edema, improved from yesterday    PULM: Clear to auscultation bilaterally, no significant sputum production    NECK: Supple, no lymphadenopathy, trachea midline  no stridor on exam    CVS: Regular rate and rhythm, no murmurs, rubs, or gallops    ABD: Soft, nondistended, nontender, normoactive bowel sounds, no masses    EXT: No edema, nontender    SKIN: Warm and well perfused, no rashes noted.    NEURO: Alert, oriented, interactive, nonfocal    DEVICES: PIV    RADIOLOGY: reviewed

## 2020-12-09 NOTE — PROGRESS NOTE ADULT - ATTENDING COMMENTS
Ms Nava was seen and examined  discussed  in interdisciplinary  rounds improved angioedema  - aware of  ace  side effect -  tolerating po    - discussed with resident agree with above    -continue steroids and   ENT consult    transfer to floor team on continuous pulse ox    NISHI

## 2020-12-10 ENCOUNTER — TRANSCRIPTION ENCOUNTER (OUTPATIENT)
Age: 40
End: 2020-12-10

## 2020-12-10 VITALS
DIASTOLIC BLOOD PRESSURE: 68 MMHG | RESPIRATION RATE: 17 BRPM | SYSTOLIC BLOOD PRESSURE: 125 MMHG | OXYGEN SATURATION: 100 % | HEART RATE: 65 BPM | TEMPERATURE: 98 F

## 2020-12-10 LAB
ALBUMIN SERPL ELPH-MCNC: 3.9 G/DL — SIGNIFICANT CHANGE UP (ref 3.3–5.2)
ALP SERPL-CCNC: 36 U/L — LOW (ref 40–120)
ALT FLD-CCNC: 12 U/L — SIGNIFICANT CHANGE UP
ANION GAP SERPL CALC-SCNC: 13 MMOL/L — SIGNIFICANT CHANGE UP (ref 5–17)
AST SERPL-CCNC: 15 U/L — SIGNIFICANT CHANGE UP
BILIRUB SERPL-MCNC: 0.4 MG/DL — SIGNIFICANT CHANGE UP (ref 0.4–2)
BUN SERPL-MCNC: 15 MG/DL — SIGNIFICANT CHANGE UP (ref 8–20)
CALCIUM SERPL-MCNC: 8.5 MG/DL — LOW (ref 8.6–10.2)
CHLORIDE SERPL-SCNC: 106 MMOL/L — SIGNIFICANT CHANGE UP (ref 98–107)
CO2 SERPL-SCNC: 21 MMOL/L — LOW (ref 22–29)
CREAT SERPL-MCNC: 0.53 MG/DL — SIGNIFICANT CHANGE UP (ref 0.5–1.3)
DSDNA AB SER-ACNC: <12 IU/ML — SIGNIFICANT CHANGE UP
GLUCOSE SERPL-MCNC: 108 MG/DL — HIGH (ref 70–99)
HCT VFR BLD CALC: 36 % — SIGNIFICANT CHANGE UP (ref 34.5–45)
HGB BLD-MCNC: 12.3 G/DL — SIGNIFICANT CHANGE UP (ref 11.5–15.5)
MAGNESIUM SERPL-MCNC: 2.3 MG/DL — SIGNIFICANT CHANGE UP (ref 1.6–2.6)
MCHC RBC-ENTMCNC: 31.6 PG — SIGNIFICANT CHANGE UP (ref 27–34)
MCHC RBC-ENTMCNC: 34.2 GM/DL — SIGNIFICANT CHANGE UP (ref 32–36)
MCV RBC AUTO: 92.5 FL — SIGNIFICANT CHANGE UP (ref 80–100)
PHOSPHATE SERPL-MCNC: 3.3 MG/DL — SIGNIFICANT CHANGE UP (ref 2.4–4.7)
PLATELET # BLD AUTO: 204 K/UL — SIGNIFICANT CHANGE UP (ref 150–400)
POTASSIUM SERPL-MCNC: 3.7 MMOL/L — SIGNIFICANT CHANGE UP (ref 3.5–5.3)
POTASSIUM SERPL-SCNC: 3.7 MMOL/L — SIGNIFICANT CHANGE UP (ref 3.5–5.3)
PROT SERPL-MCNC: 7 G/DL — SIGNIFICANT CHANGE UP (ref 6.6–8.7)
RBC # BLD: 3.89 M/UL — SIGNIFICANT CHANGE UP (ref 3.8–5.2)
RBC # FLD: 13.8 % — SIGNIFICANT CHANGE UP (ref 10.3–14.5)
SARS-COV-2 IGG SERPL QL IA: NEGATIVE — SIGNIFICANT CHANGE UP
SARS-COV-2 IGM SERPL IA-ACNC: <0.1 INDEX — SIGNIFICANT CHANGE UP
SODIUM SERPL-SCNC: 140 MMOL/L — SIGNIFICANT CHANGE UP (ref 135–145)
WBC # BLD: 15.13 K/UL — HIGH (ref 3.8–10.5)
WBC # FLD AUTO: 15.13 K/UL — HIGH (ref 3.8–10.5)

## 2020-12-10 PROCEDURE — 99239 HOSP IP/OBS DSCHRG MGMT >30: CPT

## 2020-12-10 PROCEDURE — 85027 COMPLETE CBC AUTOMATED: CPT

## 2020-12-10 PROCEDURE — 36430 TRANSFUSION BLD/BLD COMPNT: CPT

## 2020-12-10 PROCEDURE — 96372 THER/PROPH/DIAG INJ SC/IM: CPT | Mod: XU

## 2020-12-10 PROCEDURE — 0225U NFCT DS DNA&RNA 21 SARSCOV2: CPT

## 2020-12-10 PROCEDURE — 86038 ANTINUCLEAR ANTIBODIES: CPT

## 2020-12-10 PROCEDURE — 84100 ASSAY OF PHOSPHORUS: CPT

## 2020-12-10 PROCEDURE — 86900 BLOOD TYPING SEROLOGIC ABO: CPT

## 2020-12-10 PROCEDURE — 86160 COMPLEMENT ANTIGEN: CPT

## 2020-12-10 PROCEDURE — 80048 BASIC METABOLIC PNL TOTAL CA: CPT

## 2020-12-10 PROCEDURE — 80053 COMPREHEN METABOLIC PANEL: CPT

## 2020-12-10 PROCEDURE — 86225 DNA ANTIBODY NATIVE: CPT

## 2020-12-10 PROCEDURE — 85025 COMPLETE CBC W/AUTO DIFF WBC: CPT

## 2020-12-10 PROCEDURE — 86901 BLOOD TYPING SEROLOGIC RH(D): CPT

## 2020-12-10 PROCEDURE — 96374 THER/PROPH/DIAG INJ IV PUSH: CPT

## 2020-12-10 PROCEDURE — 99285 EMERGENCY DEPT VISIT HI MDM: CPT | Mod: 25

## 2020-12-10 PROCEDURE — P9059: CPT

## 2020-12-10 PROCEDURE — 83735 ASSAY OF MAGNESIUM: CPT

## 2020-12-10 PROCEDURE — 86850 RBC ANTIBODY SCREEN: CPT

## 2020-12-10 PROCEDURE — 96375 TX/PRO/DX INJ NEW DRUG ADDON: CPT

## 2020-12-10 PROCEDURE — 36415 COLL VENOUS BLD VENIPUNCTURE: CPT

## 2020-12-10 PROCEDURE — 86769 SARS-COV-2 COVID-19 ANTIBODY: CPT

## 2020-12-10 RX ORDER — HYDROXYZINE HCL 10 MG
1 TABLET ORAL
Qty: 8 | Refills: 0
Start: 2020-12-10 | End: 2020-12-13

## 2020-12-10 RX ORDER — ACETAMINOPHEN 500 MG
2 TABLET ORAL
Qty: 0 | Refills: 0 | DISCHARGE
Start: 2020-12-10

## 2020-12-10 RX ORDER — FAMOTIDINE 10 MG/ML
1 INJECTION INTRAVENOUS
Qty: 30 | Refills: 0
Start: 2020-12-10 | End: 2020-12-24

## 2020-12-10 RX ORDER — LISINOPRIL/HYDROCHLOROTHIAZIDE 10-12.5 MG
1 TABLET ORAL
Qty: 0 | Refills: 0 | DISCHARGE

## 2020-12-10 RX ADMIN — FAMOTIDINE 20 MILLIGRAM(S): 10 INJECTION INTRAVENOUS at 05:08

## 2020-12-10 RX ADMIN — Medication 4 MILLIGRAM(S): at 05:08

## 2020-12-10 RX ADMIN — SODIUM CHLORIDE 100 MILLILITER(S): 9 INJECTION, SOLUTION INTRAVENOUS at 05:56

## 2020-12-10 NOTE — DISCHARGE NOTE PROVIDER - PROVIDER TOKENS
PROVIDER:[TOKEN:[[id number]]] PROVIDER:[TOKEN:[[id number]]],FREE:[LAST:[madai],FIRST:[Hunter],PHONE:[phone]],FAX:[phone]],ADDRESS:[allergy/immunology  [address] [place] [address]]] PROVIDER:[TOKEN:[[id number]],FOLLOWUP:[1-3 days]],FREE:[LAST:[madai],FIRST:[Hunter],PHONE:[phone]],FAX:[phone]],ADDRESS:[allergy/immunology  [address] [place] [address]]]

## 2020-12-10 NOTE — DISCHARGE NOTE PROVIDER - NSDCCPCAREPLAN_GEN_ALL_CORE_FT
PRINCIPAL DISCHARGE DIAGNOSIS  Diagnosis: Angioedema due to angiotensin converting enzyme inhibitor (ACE-I)  Assessment and Plan of Treatment: -likely in the setting for ACEI use  - s/p IV steroids, benadyrl and 2U FFP  -discussed with patient to not take these class of medication anymore given her allergy to them.  - follow up with PCP       PRINCIPAL DISCHARGE DIAGNOSIS  Diagnosis: Angioedema due to angiotensin converting enzyme inhibitor (ACE-I)  Assessment and Plan of Treatment: -likely in the setting for ACEI use  - s/p IV steroids, benadyrl and 2U FFP  -discussed with patient to not take these class of medication anymore given her allergy to them.  - follow up with Dr. yLon       PRINCIPAL DISCHARGE DIAGNOSIS  Diagnosis: Angioedema due to angiotensin converting enzyme inhibitor (ACE-I)  Assessment and Plan of Treatment: -likely in the setting for ACEI use  - s/p IV steroids, benadyrl and 2U FFP  -discussed with patient to not take these class of medication anymore given her allergy to them.  - follow up with Dr. Lyon  spoke to the primary care physician Dr Lyon      SECONDARY DISCHARGE DIAGNOSES  Diagnosis: History of hypertension  Assessment and Plan of Treatment: do not take lisinopril or same group medications   BP is stable without medication   cont to monitor see dr Lyon for new medications

## 2020-12-10 NOTE — DISCHARGE NOTE PROVIDER - CARE PROVIDER_API CALL
Doe Lyon  INTERNAL MEDICINE  [address] [place] [address]  Phone: [phone]  Fax: [phone]  Follow Up Time:    Doe Lyon  INTERNAL MEDICINE  [address] [place] [address]  Phone: [phone]  Fax: [phone]  Follow Up Time:     Hunter aj  allergy/immunology  [address] [place] [address]  Phone: [phone]  Fax: [phone]  Follow Up Time:    Doe Lyon  INTERNAL MEDICINE  [address] [place] [address]  Phone: [phone]  Fax: [phone]  Follow Up Time: 1-3 days    Hunter aj  allergy/immunology  [address] [place] [address]  Phone: [phone]  Fax: [phone]  Follow Up Time:

## 2020-12-10 NOTE — DISCHARGE NOTE NURSING/CASE MANAGEMENT/SOCIAL WORK - NSDCVIVACCINE_GEN_ALL_CORE_FT
Hospitalist Progress Note           Daily Progress Note: 9/14/2020    Chief complaint: Shortness of breath  Subjective: The patient is seen for follow  up.  16-year-old gentleman admitted for shortness of breath and overall body weakness. Noted with left lower lobe pneumonia and seems to be improving on IV antibiotics.   Remains on 2 L nasal O2    Problem List:  Problem List as of 9/14/2020 Date Reviewed: 9/1/2020          Codes Class Noted - Resolved    Abdominal wall hernia ICD-10-CM: K43.9  ICD-9-CM: 553.20  8/1/2017 - Present        DDD (degenerative disc disease), lumbar ICD-10-CM: M51.36  ICD-9-CM: 722.52  5/1/2017 - Present        Chronic pain ICD-10-CM: G89.29  ICD-9-CM: 338.29  1/6/2015 - Present        Aspiration pneumonia ([facility] ICD-10-CM: J69.0  ICD-9-CM: 507.0  1/19/2014 - Present        Hypernatremia ICD-10-CM: E87.0  ICD-9-CM: 276.0  1/14/2014 - Present        Ischemic colitis ([facility] ICD-10-CM: K55.9  ICD-9-CM: 557.9  1/13/2014 - Present        Colon perforation ([facility] ICD-10-CM: K63.1  ICD-9-CM: 569.83  1/13/2014 - Present        Acute respiratory failure with hypoxia ([facility] ICD-10-CM: J96.01  ICD-9-CM: 518.81  1/13/2014 - Present        Acute blood loss anemia ICD-10-CM: D62  ICD-9-CM: 285.1  1/13/2014 - Present        JESUS (acute kidney injury) ([facility] ICD-10-CM: N17.9  ICD-9-CM: 584.9  1/13/2014 - Present        Pneumonia ICD-10-CM: J18.9  ICD-9-CM: 486  1/1/2014 - Present        Elevated INR ICD-10-CM: R79.1  ICD-9-CM: 790.92  1/1/2014 - Present        Alcoholism ([facility] ICD-10-CM: F10.20  ICD-9-CM: 303.90  1/1/2014 - Present        Nicotine addiction ICD-10-CM: F17.200  ICD-9-CM: 305.1  1/1/2014 - Present        PVD (peripheral vascular disease) ([facility] ICD-10-CM: I73.9  ICD-9-CM: 443.9  7/31/2013 - Present        Hyponatremia ICD-10-CM: E87.1  ICD-9-CM: 276.1  5/30/2012 - Present        ETOH abuse ICD-10-CM: F10.10  ICD-9-CM: 305.00  5/30/2012 - Present        Coagulation disorder [facility]) ICD-10-CM: D68.9  ICD-9-CM: 286.9  5/30/2012 - Present        Allergic rhinitis due to other allergen ICD-10-CM: J30.89  ICD-9-CM: 477.8  12/27/2011 - Present        Stroke 2002 (Chronic) ICD-10-CM: I63.9  ICD-9-CM: 434.91  3/15/2010 - Present        Anal fistula (Chronic) ICD-10-CM: K60.3  ICD-9-CM: 565.1  3/15/2010 - Present        HTN (hypertension) (Chronic) ICD-10-CM: I10  ICD-9-CM: 401.9  3/15/2010 - Present        Genital herpes (Chronic) ICD-10-CM: A60.00  ICD-9-CM: 054.10  3/15/2010 - Present        Noncompliance with treatment (Chronic) ICD-10-CM: Z91.19  ICD-9-CM: V15.81  3/15/2010 - Present        High cholesterol (Chronic) ICD-10-CM: E78.00  ICD-9-CM: 272.0  3/15/2010 - Present        left Carotid Artery Occlusion (Chronic) ICD-10-CM: Q40.08  ICD-9-CM: 433.10  3/15/2010 - Present              Medications reviewed  Current Facility-Administered Medications   Medication Dose Route Frequency    0.9% sodium chloride infusion 250 mL  250 mL IntraVENous PRN    atorvastatin (LIPITOR) tablet 20 mg  20 mg Oral DAILY    enoxaparin (LOVENOX) injection 40 mg  40 mg SubCUTAneous Q24H    losartan (COZAAR) tablet 100 mg  100 mg Oral DAILY    piperacillin-tazobactam (ZOSYN) 3.375 g in 0.9% sodium chloride (MBP/ADV) 100 mL MBP  3.375 g IntraVENous Q8H    pantoprazole (PROTONIX) tablet 40 mg  40 mg Oral DAILY    acetaminophen (TYLENOL) tablet 650 mg  650 mg Oral Q4H PRN    alum-mag hydroxide-simeth (MYLANTA) oral suspension 30 mL  30 mL Oral Q4H PRN    magnesium hydroxide (MILK OF MAGNESIA) 400 mg/5 mL oral suspension 30 mL  30 mL Oral DAILY PRN    ondansetron (ZOFRAN) injection 4 mg  4 mg IntraVENous Q8H PRN    azithromycin (ZITHROMAX) tablet 500 mg  500 mg Oral DAILY       Review of Systems:   A comprehensive review of systems was negative except for: Respiratory: positive for dyspnea on exertion    Objective:   Physical Exam:     Visit Vitals  /75 (BP 1 Location: Right arm, BP Patient Position: At rest)   Pulse 91   Temp 97.8 °F (36.6 °C)   Resp 18   SpO2 97%    O2 Flow Rate (L/min): 4 l/min O2 Device: Nasal cannula    Temp (24hrs), Av.9 °F (36.6 °C), Min:97.7 °F (36.5 °C), Max:98.3 °F (36.8 °C)    No intake/output data recorded. No intake/output data recorded. General:  Alert, cooperative, no distress, appears stated age. Lungs:   Clear to auscultation bilaterally. Chest wall:  No tenderness or deformity. Heart:  Regular rate and rhythm, S1, S2 normal, no murmur, click, rub or gallop. Abdomen:   Soft, non-tender. Bowel sounds normal. No masses,  No organomegaly. Extremities: Extremities normal, atraumatic, no cyanosis or edema. Pulses: 2+ and symmetric all extremities. Skin: Skin color, texture, turgor normal. No rashes or lesions   Neurologic: CNII-XII intact. No gross sensory or motor deficits     Data Review:       Recent Days:  Recent Labs     20  0840 20  1220 20  0726   WBC 5.5 7.0 4.7   HGB 9.4* 9.2* 7.3*   HCT 30.0* 29.1* 22.9*    270 268     Recent Labs     20  0840 20  0726   * 133*   K 3.9 3.6   CL 97 93*   CO2 35* 37*   GLU 59* 63*   BUN 4* 4*   CREA 0.52* 0.56*   CA 8.4* 7.7*     No results for input(s): PH, PCO2, PO2, HCO3, FIO2 in the last 72 hours. 24 Hour Results:  Recent Results (from the past 24 hour(s))   CBC WITH AUTOMATED DIFF    Collection Time: 20  8:40 AM   Result Value Ref Range    WBC 5.5 4.1 - 11.1 K/uL    RBC 3.27 (L) 4.10 - 5.70 M/uL    HGB 9.4 (L) 12.1 - 17.0 %    HCT 30.0 (L) 36.6 - 50.3 %    MCV 91.7 80.0 - 99.0 FL    MCH 28.7 26.0 - 34.0 PG    MCHC 31.3 30.0 - 36.5 g/dL    RDW 16.0 (H) 11.5 - 14.5 %    PLATELET 730 182 - 071 K/uL    MPV 9.1 8.9 - 12.9 FL    NEUTROPHILS 64 32 - 75 %    LYMPHOCYTES 18 12 - 49 %    MONOCYTES 13 5 - 13 %    EOSINOPHILS 4 0 - 7 %    BASOPHILS 1 0 - 1 %    IMMATURE GRANULOCYTES 0 0.0 - 0.5 %    ABS. NEUTROPHILS 3.6 1.8 - 8.0 K/UL    ABS.  LYMPHOCYTES 1.0 0.8 - 3.5 K/UL    ABS. MONOCYTES 0.7 0.0 - 1.0 K/UL    ABS. EOSINOPHILS 0.2 0.0 - 0.4 K/UL    ABS. BASOPHILS 0.0 0.0 - 0.1 K/UL    ABS. IMM. GRANS. 0.0 0.00 - 0.04 K/UL    DF AUTOMATED     METABOLIC PANEL, BASIC    Collection Time: 09/14/20  8:40 AM   Result Value Ref Range    Sodium 135 (L) 136 - 145 mmol/L    Potassium 3.9 3.5 - 5.1 mmol/L    Chloride 97 97 - 108 mmol/L    CO2 35 (H) 21 - 32 mmol/L    Anion gap 3 (L) 5 - 15 mmol/L    Glucose 59 (L) 65 - 100 mg/dL    BUN 4 (L) 6 - 20 mg/dL    Creatinine 0.52 (L) 0.70 - 1.30 mg/dL    BUN/Creatinine ratio 8 (L) 12 - 20      GFR est AA >60 >60 ml/min/1.73m2    GFR est non-AA >60 >60 ml/min/1.73m2    Calcium 8.4 (L) 8.5 - 10.1 mg/dL           Assessment/     Acute hypoxic respiratory failure  Left lower lobe pneumonia. Improving clinically  Acute on chronic anemia  Dehydration  Hypoglycemia due to poor oral intake  Rule out COVID-19  Generalized weakness and unsteady gait          Plan:  Continue supportive care including IV antibiotics  Await placement to skilled care facility  Add Ensure to meals 3 times daily  Overall clinically improving      Care Plan discussed with: Patient/Family    Total time spent with patient: 30 minutes.     Maria Del Rosario Gabriel MD No Vaccines Administered.

## 2020-12-10 NOTE — DISCHARGE NOTE PROVIDER - NSDCFUADDINST_GEN_ALL_CORE_FT
DO NOT USE medication classified as ACE inhibitors. DO NOT USE medication classified as ACE inhibitors. Please inform your pharmacy to at you to a do not prescribe list.     Dr. Clinton would like you to follow up outpatient for more allergy testing.

## 2020-12-10 NOTE — DISCHARGE NOTE PROVIDER - NSDCMRMEDTOKEN_GEN_ALL_CORE_FT
albuterol 90 mcg/inh inhalation powder: 2 puff(s) inhaled every 6 hours, As Needed  fluticasone nasal 50 mcg/inh nasal spray: 1 spray(s) nasal 2 times a day  lisinopril-hydroCHLOROthiazide 20 mg-12.5 mg oral tablet: 1 tab(s) orally once a day   albuterol 90 mcg/inh inhalation powder: 2 puff(s) inhaled every 6 hours, As Needed  fluticasone nasal 50 mcg/inh nasal spray: 1 spray(s) nasal 2 times a day   acetaminophen 500 mg oral tablet: 2 tab(s) orally every 6 hours, As Needed for headache  albuterol 90 mcg/inh inhalation powder: 2 puff(s) inhaled every 6 hours, As Needed  fluticasone nasal 50 mcg/inh nasal spray: 1 spray(s) nasal 2 times a day   acetaminophen 500 mg oral tablet: 2 tab(s) orally every 6 hours, As Needed for headache  albuterol 90 mcg/inh inhalation powder: 2 puff(s) inhaled every 6 hours, As Needed  famotidine 20 mg oral tablet: 1 tab(s) orally 2 times a day  fluticasone nasal 50 mcg/inh nasal spray: 1 spray(s) nasal 2 times a day  hydrOXYzine hydrochloride 25 mg oral tablet: 1 tab(s) orally 2 times a day  as needed for swelling or rash , itching  predniSONE 10 mg oral tablet: 4 tab(s) orally once a day x 2 days  3 tab(s) orally once a day x 2 days  2 tab(s) orally once a day x 2 days  1 tab(s) orally once a day x 2 days

## 2020-12-10 NOTE — DISCHARGE NOTE PROVIDER - HOSPITAL COURSE
40F w/ PMHx HTN, asthma admitted on 12/08 w/ facial swelling. Pt had a similar allergic reaction to lisinopril in 05/2019 but continued to take lisinopril on and off.  Last took lisinopril on the morning of her admission. Patient was admitted to ICU for worsen facial swelling and started on IV steroids and benadryl. Patient received  2units of FFP in ICU. Patient facial swelling improved and downgraded to medicine service. Patient was seen by Dr. Clinton (allergy/immunololgy) on 12/09, and recommends...       Patient was educated extensively to not take any ACEI in the future given her reaction to the medication.    40F w/ PMHx HTN, asthma admitted on 12/08 w/ facial swelling. Pt had a similar allergic reaction to lisinopril in 05/2019 but continued to take lisinopril on and off.  Last took lisinopril on the morning of her admission. Patient was admitted to ICU for worsen facial swelling and started on IV steroids and benadryl. Patient received  2units of FFP in ICU. Patient facial swelling improved and downgraded to medicine service. Patient was seen by Dr. Clinton (allergy/immunololgy) on 12/09, and recommends...       Patient was educated extensively to not take any ACEI in the future given her reaction to the medication.     GENERAL: A&Ox3, NAD  HEENT: face symmetrical, no obvious swelling   CV: + s1,s2 regular rate and rhythm  Lungs: Good air entry; normal effort, CTA bilat. no wheezes, rales or rhonchi  ABDOMEN: soft, nontender, nondistended  EXTREMITIES:  no edema, cyanosis  skin: warm, dry, no rashes.   Neuro:  speech clear and concise, no motor or sensory deficits     40F w/ PMHx HTN, asthma admitted on 12/08 w/ facial swelling. Pt had a similar allergic reaction to lisinopril in 05/2019 but continued to take lisinopril on and off.  Last took lisinopril on the morning of her admission. Patient was admitted to ICU for worsen facial swelling and started on IV steroids and benadryl. Patient received  2units of FFP in ICU. Patient facial swelling improved and downgraded to medicine service. Patient was seen by Dr. Clinton (allergy/immunololgy) on 12/09, and recommends patient to discharge with prednisone and benadryl. will also given patient an epipen in case of an allergic emergency. Patient was educated extensively to not take any ACEI in the future given her reaction to the medication. At this time, patient is medically stable for discharge.    Vital Signs Last 24 Hrs  T(C): 36.9 (10 Dec 2020 00:09), Max: 37.1 (09 Dec 2020 11:58)  T(F): 98.5 (10 Dec 2020 00:09), Max: 98.7 (09 Dec 2020 11:58)  HR: 67 (10 Dec 2020 00:09) (65 - 78)  BP: 112/70 (10 Dec 2020 00:09) (111/74 - 124/77)  BP(mean): 92 (09 Dec 2020 21:00) (85 - 96)  RR: 19 (10 Dec 2020 00:09) (13 - 22)  SpO2: 97% (10 Dec 2020 00:09) (96% - 100%)  GENERAL: A&Ox3, NAD  HEENT: face symmetrical, no obvious swelling   CV: + s1,s2 regular rate and rhythm  Lungs: Good air entry; normal effort, CTA bilat. no wheezes, rales or rhonchi  ABDOMEN: soft, nontender, nondistended  EXTREMITIES:  no edema, cyanosis  skin: warm, dry, no rashes.   Neuro:  speech clear and concise, no motor or sensory deficits

## 2020-12-10 NOTE — DISCHARGE NOTE PROVIDER - NSDCPNSUBOBJ_GEN_ALL_CORE
seen in am , she is feeling well  no swelling , no pain , no SOB   d/w Dr Clinton allergy immunology specialist re managament       Vital Signs Last 24 Hrs  T(C): 36.9 (10 Dec 2020 00:09), Max: 37.1 (09 Dec 2020 11:58)  T(F): 98.5 (10 Dec 2020 00:09), Max: 98.7 (09 Dec 2020 11:58)  HR: 67 (10 Dec 2020 00:09) (65 - 78)  BP: 112/70 (10 Dec 2020 00:09) (111/74 - 124/77)  BP(mean): 92 (09 Dec 2020 21:00) (85 - 96)  RR: 19 (10 Dec 2020 00:09) (13 - 22)  SpO2: 97% (10 Dec 2020 00:09) (96% - 100%)    GENERAL: A&Ox3, NAD  HEENT: face symmetrical, no obvious swelling   CV: + s1,s2 regular rate and rhythm  Lungs: Good air entry; normal effort, CTA bilat. no wheezes, rales or rhonchi  ABDOMEN: soft, nontender, nondistended  EXTREMITIES:  no edema, cyanosis  skin: warm, dry, no rashes.   Neuro:  speech clear and concise, no motor or sensory deficits    1- Angioedema improving   cont steroid taper , pepcid   hydroxysizine prn   avoid ace inh , ARB   called  PCP DR Lyon and  informed his office today spoke to the nurse   pt is instructed as well     2- H/o HTN   stable without meds   will refer to PCP to restart meds as indicated

## 2020-12-10 NOTE — DISCHARGE NOTE NURSING/CASE MANAGEMENT/SOCIAL WORK - PATIENT PORTAL LINK FT
You can access the FollowMyHealth Patient Portal offered by Health system by registering at the following website: [email or web address]. By joining [facility]’s FollowMyHealth portal, you will also be able to view your health information using other applications (apps) compatible with our system.

## 2020-12-12 LAB
C1INH FUNCTIONAL/C1INH TOTAL MFR SERPL: 31 MG/DL — SIGNIFICANT CHANGE UP (ref 21–39)
C4 SERPL-MCNC: 14 MG/DL — SIGNIFICANT CHANGE UP (ref 13–39)

## 2021-01-25 ENCOUNTER — EMERGENCY (EMERGENCY)
Facility: HOSPITAL | Age: 41
LOS: 1 days | Discharge: HOME | End: 2021-01-25
Attending: EMERGENCY MEDICINE
Payer: COMMERCIAL

## 2021-01-25 VITALS
DIASTOLIC BLOOD PRESSURE: 85 MMHG | OXYGEN SATURATION: 100 % | RESPIRATION RATE: 18 BRPM | HEART RATE: 71 BPM | SYSTOLIC BLOOD PRESSURE: 135 MMHG | TEMPERATURE: 97 F

## 2021-01-25 VITALS
HEIGHT: 65 IN | DIASTOLIC BLOOD PRESSURE: 94 MMHG | WEIGHT: 149.91 LBS | RESPIRATION RATE: 20 BRPM | OXYGEN SATURATION: 99 % | HEART RATE: 81 BPM | SYSTOLIC BLOOD PRESSURE: 138 MMHG | TEMPERATURE: 98 F

## 2021-01-25 LAB
ALBUMIN SERPL ELPH-MCNC: 4.2 G/DL — SIGNIFICANT CHANGE UP (ref 3.3–5.2)
ALP SERPL-CCNC: 51 U/L — SIGNIFICANT CHANGE UP (ref 40–120)
ALT FLD-CCNC: 18 U/L — SIGNIFICANT CHANGE UP
ANION GAP SERPL CALC-SCNC: 11 MMOL/L — SIGNIFICANT CHANGE UP (ref 5–17)
AST SERPL-CCNC: 26 U/L — SIGNIFICANT CHANGE UP
BASOPHILS # BLD AUTO: 0.04 K/UL — SIGNIFICANT CHANGE UP (ref 0–0.2)
BASOPHILS NFR BLD AUTO: 0.5 % — SIGNIFICANT CHANGE UP (ref 0–2)
BILIRUB SERPL-MCNC: 0.4 MG/DL — SIGNIFICANT CHANGE UP (ref 0.4–2)
BUN SERPL-MCNC: 5 MG/DL — LOW (ref 8–20)
CALCIUM SERPL-MCNC: 8.7 MG/DL — SIGNIFICANT CHANGE UP (ref 8.6–10.2)
CHLORIDE SERPL-SCNC: 102 MMOL/L — SIGNIFICANT CHANGE UP (ref 98–107)
CO2 SERPL-SCNC: 25 MMOL/L — SIGNIFICANT CHANGE UP (ref 22–29)
CREAT SERPL-MCNC: 0.54 MG/DL — SIGNIFICANT CHANGE UP (ref 0.5–1.3)
D DIMER BLD IA.RAPID-MCNC: <150 NG/ML DDU — SIGNIFICANT CHANGE UP
EOSINOPHIL # BLD AUTO: 0.22 K/UL — SIGNIFICANT CHANGE UP (ref 0–0.5)
EOSINOPHIL NFR BLD AUTO: 2.8 % — SIGNIFICANT CHANGE UP (ref 0–6)
GLUCOSE SERPL-MCNC: 89 MG/DL — SIGNIFICANT CHANGE UP (ref 70–99)
HCG SERPL-ACNC: <4 MIU/ML — SIGNIFICANT CHANGE UP
HCT VFR BLD CALC: 39.9 % — SIGNIFICANT CHANGE UP (ref 34.5–45)
HGB BLD-MCNC: 13.8 G/DL — SIGNIFICANT CHANGE UP (ref 11.5–15.5)
IMM GRANULOCYTES NFR BLD AUTO: 0.3 % — SIGNIFICANT CHANGE UP (ref 0–1.5)
LYMPHOCYTES # BLD AUTO: 2.84 K/UL — SIGNIFICANT CHANGE UP (ref 1–3.3)
LYMPHOCYTES # BLD AUTO: 35.7 % — SIGNIFICANT CHANGE UP (ref 13–44)
MCHC RBC-ENTMCNC: 31.9 PG — SIGNIFICANT CHANGE UP (ref 27–34)
MCHC RBC-ENTMCNC: 34.6 GM/DL — SIGNIFICANT CHANGE UP (ref 32–36)
MCV RBC AUTO: 92.1 FL — SIGNIFICANT CHANGE UP (ref 80–100)
MONOCYTES # BLD AUTO: 0.47 K/UL — SIGNIFICANT CHANGE UP (ref 0–0.9)
MONOCYTES NFR BLD AUTO: 5.9 % — SIGNIFICANT CHANGE UP (ref 2–14)
NEUTROPHILS # BLD AUTO: 4.37 K/UL — SIGNIFICANT CHANGE UP (ref 1.8–7.4)
NEUTROPHILS NFR BLD AUTO: 54.8 % — SIGNIFICANT CHANGE UP (ref 43–77)
PLATELET # BLD AUTO: 241 K/UL — SIGNIFICANT CHANGE UP (ref 150–400)
POTASSIUM SERPL-MCNC: 4 MMOL/L — SIGNIFICANT CHANGE UP (ref 3.5–5.3)
POTASSIUM SERPL-SCNC: 4 MMOL/L — SIGNIFICANT CHANGE UP (ref 3.5–5.3)
PROT SERPL-MCNC: 7 G/DL — SIGNIFICANT CHANGE UP (ref 6.6–8.7)
RBC # BLD: 4.33 M/UL — SIGNIFICANT CHANGE UP (ref 3.8–5.2)
RBC # FLD: 12.5 % — SIGNIFICANT CHANGE UP (ref 10.3–14.5)
SARS-COV-2 RNA SPEC QL NAA+PROBE: DETECTED
SODIUM SERPL-SCNC: 138 MMOL/L — SIGNIFICANT CHANGE UP (ref 135–145)
TROPONIN T SERPL-MCNC: <0.01 NG/ML — SIGNIFICANT CHANGE UP (ref 0–0.06)
WBC # BLD: 7.96 K/UL — SIGNIFICANT CHANGE UP (ref 3.8–10.5)
WBC # FLD AUTO: 7.96 K/UL — SIGNIFICANT CHANGE UP (ref 3.8–10.5)

## 2021-01-25 PROCEDURE — 93010 ELECTROCARDIOGRAM REPORT: CPT

## 2021-01-25 PROCEDURE — 85379 FIBRIN DEGRADATION QUANT: CPT

## 2021-01-25 PROCEDURE — 85025 COMPLETE CBC W/AUTO DIFF WBC: CPT

## 2021-01-25 PROCEDURE — 84702 CHORIONIC GONADOTROPIN TEST: CPT

## 2021-01-25 PROCEDURE — 36415 COLL VENOUS BLD VENIPUNCTURE: CPT

## 2021-01-25 PROCEDURE — 93005 ELECTROCARDIOGRAM TRACING: CPT

## 2021-01-25 PROCEDURE — 99285 EMERGENCY DEPT VISIT HI MDM: CPT

## 2021-01-25 PROCEDURE — 71046 X-RAY EXAM CHEST 2 VIEWS: CPT | Mod: 26

## 2021-01-25 PROCEDURE — 80053 COMPREHEN METABOLIC PANEL: CPT

## 2021-01-25 PROCEDURE — U0003: CPT

## 2021-01-25 PROCEDURE — 99284 EMERGENCY DEPT VISIT MOD MDM: CPT | Mod: 25

## 2021-01-25 PROCEDURE — 71046 X-RAY EXAM CHEST 2 VIEWS: CPT

## 2021-01-25 PROCEDURE — 84484 ASSAY OF TROPONIN QUANT: CPT

## 2021-01-25 PROCEDURE — 96374 THER/PROPH/DIAG INJ IV PUSH: CPT

## 2021-01-25 PROCEDURE — U0005: CPT

## 2021-01-25 RX ORDER — LIDOCAINE 4 G/100G
5 CREAM TOPICAL
Qty: 60 | Refills: 0
Start: 2021-01-25 | End: 2021-01-26

## 2021-01-25 RX ORDER — KETOROLAC TROMETHAMINE 30 MG/ML
15 SYRINGE (ML) INJECTION ONCE
Refills: 0 | Status: DISCONTINUED | OUTPATIENT
Start: 2021-01-25 | End: 2021-01-25

## 2021-01-25 RX ORDER — LIDOCAINE 4 G/100G
10 CREAM TOPICAL ONCE
Refills: 0 | Status: COMPLETED | OUTPATIENT
Start: 2021-01-25 | End: 2021-01-25

## 2021-01-25 RX ADMIN — LIDOCAINE 10 MILLILITER(S): 4 CREAM TOPICAL at 15:35

## 2021-01-25 RX ADMIN — Medication 15 MILLIGRAM(S): at 15:44

## 2021-01-25 NOTE — ED STATDOCS - OBJECTIVE STATEMENT
41 y/o F with PMHx of asthma and HTN presents to ED c/o throat pain, heart palpitations and high bp. Associated symptoms of intermittent chest pressure this past week and some sob due to throat pain. Pt states she hasn't taken anything for pain. Denies fevers. Denies swelling of legs. Pt states she had COVID a month ago.   : Alla 41 y/o F with PMHx of asthma and HTN presents to ED c/o throat pain, heart palpitations and high bp. Associated symptoms of intermittent chest pressure this past week and some sob due to throat pain. sx not exertional. no pleuritic pain. no leg swelling. Pt states she hasn't taken anything for pain. Denies fevers. Denies swelling of legs. Pt states she had COVID a month ago.   : Alla

## 2021-01-25 NOTE — ED STATDOCS - PROGRESS NOTE DETAILS
NP NOTE:  Charting and results reviewed, d-dimer negative.  Patient states she is feeling much better.  No tachycardia on CM.  Will d/c home f/u Dr Lyon.

## 2021-01-25 NOTE — ED STATDOCS - NSFOLLOWUPINSTRUCTIONS_ED_ALL_ED_FT
- stay in quarantine until you get the results of your covid swab  - continue levaquin that you were given by Dr Lyon  - make an appointment with Dr Lyon for this week

## 2021-01-25 NOTE — ED STATDOCS - ATTENDING CONTRIBUTION TO CARE
I, Norma Galloway, performed the initial face to face bedside interview with this patient regarding history of present illness, review of symptoms and relevant past medical, social and family history.  I completed an independent physical examination.   The medical decision making and follow-up on ordered tests (ie labs, radiologic studies) and re-evaluation of the patient's status has been communicated to the ACP.  Disposition of the patient will be based on test outcome and response to ED interventions.  The history, relevant review of systems, past medical and surgical history, medical decision making, and physical examination was documented by the scribe in my presence and I attest to the accuracy of the documentation.

## 2021-01-25 NOTE — ED STATDOCS - CLINICAL SUMMARY MEDICAL DECISION MAKING FREE TEXT BOX
Pt with several complaints post covid, non exertional cp with palpitations, EKG no ischemic changes. Will obtain labs with trop. Noted left submandibular lymphadenopathy, no pharyngitis, no concern for pta or rpa, requesting covid swab to travel on Sunday, treat symptoms and likely discharge.

## 2021-01-25 NOTE — ED STATDOCS - PHYSICAL EXAMINATION
LALIM to confirm appointment with Dr. Valdovinos on 1/8/19.  
Gen: NAD, AOx3  Head: NCAT  HEENT: EOMI, oral mucosa moist, normal conjunctiva, neck supple, normal oropharynx, left sided enlarged submandibular lymph node, full ROM neck, no trismus  Lung: no respiratory distress  CV:  Normal perfusion  Abd: soft, NTND  MSK: No edema, no visible deformities  Neuro: No focal neurologic deficits  Skin: No rash   Psych: normal affect

## 2021-01-25 NOTE — ED STATDOCS - PATIENT PORTAL LINK FT
You can access the FollowMyHealth Patient Portal offered by [facility] by registering at the following website: [email or web address]. By joining [employer]’s FollowMyHealth portal, you will also be able to view your health information using other applications (apps) compatible with our system.

## 2021-01-25 NOTE — ED ADULT NURSE NOTE - CHIEF COMPLAINT
The patient is a 40y Female complaining of hypertension. Surgical Defect Length In Cm (Optional): 2.8

## 2021-01-25 NOTE — ED STATDOCS - NS ED ROS FT
ROS: (+) chest pain, (+) palpitations, (+) high bp, (+) SOB. (+) throat pain, no cough. no fever. no n/v/d/c. no abd pain. no rash. no bleeding. no urinary complaints. no weakness. no vision changes. no HA. no neck/back pain. no extremity swelling/deformity. No change in mental status.

## 2021-04-24 ENCOUNTER — RX RENEWAL (OUTPATIENT)
Age: 41
End: 2021-04-24

## 2021-04-24 RX ORDER — ETONOGESTREL AND ETHINYL ESTRADIOL 11.7; 2.7 MG/1; MG/1
0.12-0.015 INSERT, EXTENDED RELEASE VAGINAL
Qty: 1 | Refills: 6 | Status: ACTIVE | COMMUNITY
Start: 2020-10-01 | End: 1900-01-01

## 2021-05-11 ENCOUNTER — APPOINTMENT (OUTPATIENT)
Dept: OBGYN | Facility: CLINIC | Age: 41
End: 2021-05-11
Payer: COMMERCIAL

## 2021-05-11 VITALS — SYSTOLIC BLOOD PRESSURE: 133 MMHG | WEIGHT: 140.5 LBS | DIASTOLIC BLOOD PRESSURE: 90 MMHG | BODY MASS INDEX: 23.38 KG/M2

## 2021-05-11 PROCEDURE — 99072 ADDL SUPL MATRL&STAF TM PHE: CPT

## 2021-05-11 PROCEDURE — 99395 PREV VISIT EST AGE 18-39: CPT

## 2021-05-15 LAB — CYTOLOGY CVX/VAG DOC THIN PREP: ABNORMAL

## 2021-07-12 ENCOUNTER — APPOINTMENT (OUTPATIENT)
Dept: THORACIC SURGERY | Facility: CLINIC | Age: 41
End: 2021-07-12
Payer: COMMERCIAL

## 2021-07-19 PROBLEM — U07.1 COVID-19 VIRUS INFECTION: Status: RESOLVED | Noted: 2021-07-19 | Resolved: 2021-07-19

## 2021-07-19 PROBLEM — Z87.09 HISTORY OF ASTHMA: Status: RESOLVED | Noted: 2021-07-19 | Resolved: 2021-07-19

## 2021-07-20 ENCOUNTER — APPOINTMENT (OUTPATIENT)
Dept: THORACIC SURGERY | Facility: CLINIC | Age: 41
End: 2021-07-20
Payer: COMMERCIAL

## 2021-07-20 VITALS
SYSTOLIC BLOOD PRESSURE: 136 MMHG | DIASTOLIC BLOOD PRESSURE: 89 MMHG | RESPIRATION RATE: 18 BRPM | HEIGHT: 64 IN | HEART RATE: 68 BPM | BODY MASS INDEX: 23.22 KG/M2 | OXYGEN SATURATION: 100 % | WEIGHT: 136 LBS

## 2021-07-20 VITALS — TEMPERATURE: 98.2 F

## 2021-07-20 DIAGNOSIS — Z87.09 PERSONAL HISTORY OF OTHER DISEASES OF THE RESPIRATORY SYSTEM: ICD-10-CM

## 2021-07-20 DIAGNOSIS — U07.1 COVID-19: ICD-10-CM

## 2021-07-20 DIAGNOSIS — Z23 ENCOUNTER FOR IMMUNIZATION: ICD-10-CM

## 2021-07-20 PROCEDURE — 99204 OFFICE O/P NEW MOD 45 MIN: CPT

## 2021-07-20 RX ORDER — METHYLPREDNISOLONE 4 MG/1
4 TABLET ORAL
Refills: 0 | Status: COMPLETED | COMMUNITY
End: 2021-07-20

## 2021-07-20 RX ORDER — CEFDINIR 300 MG/1
300 CAPSULE ORAL
Refills: 0 | Status: COMPLETED | COMMUNITY
End: 2021-07-20

## 2021-07-20 RX ORDER — ASPIRIN 81 MG
81 TABLET, DELAYED RELEASE (ENTERIC COATED) ORAL
Refills: 0 | Status: ACTIVE | COMMUNITY

## 2021-07-20 RX ORDER — MONTELUKAST SODIUM 10 MG/1
10 TABLET, FILM COATED ORAL
Refills: 0 | Status: COMPLETED | COMMUNITY
End: 2021-07-20

## 2021-07-20 NOTE — REVIEW OF SYSTEMS
[As Noted in HPI] : as noted in HPI [Swollen Glands] : swollen glands [Swollen Glands In The Neck] : swollen glands in the neck [Negative] : Endocrine

## 2021-07-20 NOTE — PHYSICAL EXAM
[General Appearance - Alert] : alert [General Appearance - In No Acute Distress] : in no acute distress [FreeTextEntry1] : There is a left submandibular swelling, that is mildly tender [Heart Rate And Rhythm] : heart rate was normal and rhythm regular [Auscultation Breath Sounds / Voice Sounds] : lungs were clear to auscultation bilaterally [Heart Sounds] : normal S1 and S2 [Heart Sounds Gallop] : no gallops [Murmurs] : no murmurs [Heart Sounds Pericardial Friction Rub] : no pericardial rub [Examination Of The Chest] : the chest was normal in appearance [Chest Visual Inspection Thoracic Asymmetry] : no chest asymmetry [Diminished Respiratory Excursion] : normal chest expansion [Bowel Sounds] : normal bowel sounds [Abdomen Soft] : soft [Abdomen Tenderness] : non-tender [] : no hepato-splenomegaly [Abdomen Mass (___ Cm)] : no abdominal mass palpated [Axillary Lymph Nodes Enlarged Bilaterally] : axillary [Cervical Lymph Nodes Enlarged Anterior Bilaterally] : anterior cervical [Inguinal Lymph Nodes Enlarged Bilaterally] : inguinal [No Spinal Tenderness] : no spinal tenderness [No Focal Deficits] : no focal deficits [Oriented To Time, Place, And Person] : oriented to person, place, and time [Impaired Insight] : insight and judgment were intact [Affect] : the affect was normal

## 2021-07-20 NOTE — HISTORY OF PRESENT ILLNESS
[FreeTextEntry1] : Ms. ARTHUR is a 41 year old female referred by Dr. Galileo Elizabeth for a initial consultation on a abnormal CT scan and needle ultrasound aspiration for a neck mass known for 4 months. Her 5.22.21 CT Soft  Tissue Neck  with Contrast from [facility] showed a  small cluster of   a borderline 1.1 cm,  left level 5B left supraclavicular lymph nodes of which one has low density center. There was no other neck mass lesion or enlarge lymph nodes. \par \par Her chief complaint includes tenderness along the left side of the neck without benefit from NSAIDs (Naprosyn)  or antibiotic (Cefdinir). Past medical history dysmenorrhea, supraclavicular adenopathy, COVID-19 virus infection, asthma, and hypertension. She is here to discuss interventional management. \par  \par

## 2021-07-20 NOTE — ASSESSMENT
[FreeTextEntry1] : Flower is a 41-year-old female with a tender sublingual nodule has been treated with antibiotics and steroids without significant improvement. A CT of the neck showed a small supraclavicular lymph node as well. She did have a biopsy taken, but the results are not available today. This does appear to be inflammatory but further evaluation may be necessary. .I will be obtaining all of her records and plan for a follow up in one weeks time.\par \par \par Thank you for allowing me to participate in the care of your patient.\par \par 45 minutes was spent during this encounter.\par \par \par Meliton Rivera MD\par Department of Cardiovascular and Thoracic Surgery\par \par [employer] at [facility]\par

## 2021-07-20 NOTE — CONSULT LETTER
[FreeTextEntry2] : Dr. Galileo Elizabeth  [FreeTextEntry3] : Meliton Rivera MD\par Department of Cardiovascular and Thoracic Surgery\par \par [employer]\par [employer] at [facility]

## 2021-07-20 NOTE — DATA REVIEWED
[FreeTextEntry1] : 5.22.21 CT Soft  Tissue Neck  with Contrast from [facility] \par - Small cluster of borderline up 1.1 cm left level 5B left supraclavicular lymph nodes of which one has low density center suggesting cyst formation , necrosis. \par - No other neck mass lesion or enlarge lymph nodes.\par -Nonspecific effacement of the left piriform sinus without definite  focal mass/. \par -Mild /moderate bilateral maxillary and moderate ethmoid and mild lateral sphenoid sinus disease without evidence of acute sinusitis

## 2021-08-16 ENCOUNTER — APPOINTMENT (OUTPATIENT)
Dept: THORACIC SURGERY | Facility: CLINIC | Age: 41
End: 2021-08-16
Payer: COMMERCIAL

## 2021-08-16 VITALS
DIASTOLIC BLOOD PRESSURE: 87 MMHG | RESPIRATION RATE: 63 BRPM | HEART RATE: 63 BPM | OXYGEN SATURATION: 100 % | WEIGHT: 136 LBS | BODY MASS INDEX: 23.22 KG/M2 | SYSTOLIC BLOOD PRESSURE: 140 MMHG | HEIGHT: 64 IN | TEMPERATURE: 98.2 F

## 2021-08-16 PROCEDURE — 99214 OFFICE O/P EST MOD 30 MIN: CPT

## 2021-08-16 NOTE — CONSULT LETTER
[Dear  ___] : Dear  [unfilled], [Courtesy Letter:] : I had the pleasure of seeing your patient, [unfilled], in my office today. [Please see my note below.] : Please see my note below. [Consult Closing:] : Thank you very much for allowing me to participate in the care of this patient.  If you have any questions, please do not hesitate to contact me. [Sincerely,] : Sincerely, [FreeTextEntry2] : Dr. Galileo Elizabeth  [FreeTextEntry3] : Meliton Rivera MD\par Department of Cardiovascular and Thoracic Surgery\par \par [employer]\par [employer] at [facility]

## 2021-08-16 NOTE — DATA REVIEWED
[FreeTextEntry1] : 6.14.21 Fine Needle Aspiration Supraclavicular Neck Left  from [facility] \par - B cell population with a slightly predominant surface Kappa light chain expression. \par \par 5.22.21 CT Soft  Tissue Neck  with Contrast from [facility] \par - Small cluster of borderline up 1.1 cm left level 5B left supraclavicular lymph nodes of which one has low density center suggesting cyst formation , necrosis. \par - No other neck mass lesion or enlarge lymph nodes.\par -Nonspecific effacement of the left piriform sinus without definite  focal mass/. \par -Mild /moderate bilateral maxillary and moderate ethmoid and mild lateral sphenoid sinus disease without evidence of acute sinusitis

## 2021-08-16 NOTE — ASSESSMENT
[FreeTextEntry1] : Flower Is a 41-year-old female with swelling in the left submandibular and supraclavicular lymph nodes of unclear etiology. I have recommended a repeat ultrasound of the neck with interventional core needle biopsy to obtain tissue. If this is unsuccessful to make a diagnosis excisional biopsy may be necessary.\par \par thank you for allowing me to participate in the care of your patient.\par \par 30 minutes was spent during this encounter.\par \par Meliton Rivera MD\par Department of Cardiovascular and Thoracic Surgery\par \par [employer]\par [employer] [facility] [employer] at [facility]\par

## 2021-08-16 NOTE — HISTORY OF PRESENT ILLNESS
[FreeTextEntry1] : Ms. ARTHUR is a 41 year old female referred by Dr. Galileo Elizabeth for a initial consultation on a abnormal CT scan and needle ultrasound aspiration for a neck mass known for 4 months. Her 5.22.21 CT Soft Tissue Neck with Contrast from [facility] showed a small cluster of a borderline 1.1 cm, left supraclavicular lymph nodes of which one has low density center.  A biopsy was proposed to identify the etiology.\par \par Her chief complaint includes tenderness along the left side of the neck without benefit from NSAIDs (Naprosyn) or antibiotic (Cefdinir). Her past medical history  is significant for dysmenorrhea, supraclavicular adenopathy, COVID-19 virus infection, asthma, and hypertension. She is here to discuss her  6.14.21 Fine Needle Aspiration Supraclavicular Neck Left  pathology and  interventional management.

## 2021-08-16 NOTE — PHYSICAL EXAM
[FreeTextEntry1] : cervical node swelling [] : no respiratory distress [Auscultation Breath Sounds / Voice Sounds] : lungs were clear to auscultation bilaterally [Heart Rate And Rhythm] : heart rate was normal and rhythm regular [Heart Sounds] : normal S1 and S2 [Heart Sounds Gallop] : no gallops [Murmurs] : no murmurs [Heart Sounds Pericardial Friction Rub] : no pericardial rub [No Spinal Tenderness] : no spinal tenderness [No Focal Deficits] : no focal deficits [Impaired Insight] : insight and judgment were intact [Oriented To Time, Place, And Person] : oriented to person, place, and time [Affect] : the affect was normal

## 2021-08-23 ENCOUNTER — EMERGENCY (EMERGENCY)
Facility: HOSPITAL | Age: 41
LOS: 1 days | Discharge: HOME | End: 2021-08-23
Attending: EMERGENCY MEDICINE
Payer: COMMERCIAL

## 2021-08-23 VITALS
RESPIRATION RATE: 18 BRPM | TEMPERATURE: 98 F | SYSTOLIC BLOOD PRESSURE: 145 MMHG | HEART RATE: 73 BPM | OXYGEN SATURATION: 100 % | DIASTOLIC BLOOD PRESSURE: 81 MMHG | HEIGHT: 65 IN

## 2021-08-23 PROCEDURE — 99283 EMERGENCY DEPT VISIT LOW MDM: CPT

## 2021-08-23 RX ORDER — OXYCODONE AND ACETAMINOPHEN 5; 325 MG/1; MG/1
1 TABLET ORAL ONCE
Refills: 0 | Status: DISCONTINUED | OUTPATIENT
Start: 2021-08-23 | End: 2021-08-23

## 2021-08-23 RX ORDER — IBUPROFEN 200 MG
1 TABLET ORAL
Qty: 20 | Refills: 0
Start: 2021-08-23 | End: 2021-08-27

## 2021-08-23 RX ADMIN — OXYCODONE AND ACETAMINOPHEN 1 TABLET(S): 5; 325 TABLET ORAL at 20:36

## 2021-08-23 RX ADMIN — Medication 300 MILLIGRAM(S): at 20:36

## 2021-08-23 NOTE — ED PROVIDER NOTE - NSFOLLOWUPINSTRUCTIONS_ED_ALL_ED_FT
- Prescription sent to pharmacy.  - Acetaminophen 650-975mg every 6 hours for pain.  - Please call tomorrow to schedule follow up appointment with dentist.  - Please bring all documentation from your ED visit to any related future follow up appointment.  - Please call to schedule follow up appointment with your primary care physician within 24-48 hours.  - Please seek immediate medical attention for any new/worsening, signs/symptoms, or concerns.    Feel better!       Toothache    WHAT YOU NEED TO KNOW:    What is a toothache and what causes it? A toothache is pain that is caused by irritation of the nerves in the center of your tooth. The irritation may be caused by several problems, such as a cavity, an infection, a cracked tooth, or gum disease.    Tooth Anatomy         How is a toothache diagnosed? Your healthcare provider will ask how long you have had a toothache. He or she will also ask if you have any other symptoms or medical conditions. Your healthcare provider will examine your tooth and mouth. Your provider may also examine your face and neck. You may need x-rays to check for an infection or cracked tooth.     How is a toothache treated? Treatment depends on the cause of your toothache. You may need any of the following:   •NSAIDs, such as ibuprofen, help decrease swelling, pain, and fever. This medicine is available with or without a doctor's order. NSAIDs can cause stomach bleeding or kidney problems in certain people. If you take blood thinner medicine, always ask if NSAIDs are safe for you. Always read the medicine label and follow directions. Do not give these medicines to children under 6 months of age without direction from your child's healthcare provider.      •Acetaminophen decreases pain and fever. It is available without a doctor's order. Ask how much to take and how often to take it. Follow directions. Acetaminophen can cause liver damage if not taken correctly.      •Prescription pain medicine may be given. Ask your healthcare provider how to take this medicine safely. Some prescription pain medicines contain acetaminophen. Do not take other medicines that contain acetaminophen without talking to your healthcare provider. Too much acetaminophen may cause liver damage. Prescription pain medicine may cause constipation. Ask your healthcare provider how to prevent or treat constipation.       •Antibiotics help treat or prevent a bacterial infection.       How can I manage my toothache?   •Rinse your mouth with warm salt water 4 times a day or as directed.       •Eat soft foods to help relieve pain caused by chewing.       •Apply ice on your jaw or cheek for 15 to 20 minutes every hour or as directed. Use an ice pack, or put crushed ice in a plastic bag. Cover it with a towel before you apply it. Ice helps prevent tissue damage and decreases swelling and pain.      How can I help prevent a toothache?   •Brush your teeth at least 2 times a day.      •Use dental floss to clean between your teeth at least 1 time a day.      •See your dentist regularly every 6 months for dental cleanings and oral exams.      When should I seek immediate care?   •You have trouble breathing or swallowing.       •You have swelling in your face or neck.       When should I contact my dentist?   •You have a fever and chills.       •You have trouble opening or closing your mouth.       •You have swelling around your tooth.       •You have questions or concerns about your condition or care.      CARE AGREEMENT:    You have the right to help plan your care. Learn about your health condition and how it may be treated. Discuss treatment options with your healthcare providers to decide what care you want to receive. You always have the right to refuse treatment.

## 2021-08-23 NOTE — ED PROVIDER NOTE - PATIENT PORTAL LINK FT
You can access the FollowMyHealth Patient Portal offered by [facility] by registering at the following website: [email or web address]. By joining [employer]’s FollowMyHealth portal, you will also be able to view your health information using other applications (apps) compatible with our system.

## 2021-08-23 NOTE — ED PROVIDER NOTE - OBJECTIVE STATEMENT
42 yo female PMHx asthma, HTN presents to ED c/o toothache x1 week. Self medicating with ibuprofen 600mg 1.5 hours ago. Has dental appointment scheduled for 9/24.   Denies fevers, difficulty swallowing. 40 yo female PMHx asthma, HTN presents to ED c/o toothache x1 week. Self medicating with ibuprofen 600mg, last dose 1.5 hours ago. Has dental appointment scheduled for 9/24. No further complaints at this time.   Denies fevers, difficulty swallowing.

## 2021-08-23 NOTE — ED PROVIDER NOTE - ATTENDING CONTRIBUTION TO CARE
right lower dental pain; on exam, poor dentition, multiple caries and prior fillings; no trismus; +patent airway, normal voice and handling secreations; agree with plan for pain control, antibx, and outpt dental f/u

## 2021-08-23 NOTE — ED PROVIDER NOTE - PHYSICAL EXAMINATION
multiple fillings, cracked tooth General: A&Ox3. In NAD, non-toxic appearing; well nourished/developed. Phonation normal.  Skin: No rashes or lesions. Warm, dry, color normal for race.   Mouth/Throat: Airway patent. Tolerating secretions, no drooling or trismus. Poor dentition. Multiple fillings. Cracked tooth left lower quadrant. Tonsils and pharynx without erythema or exudates. Tonsils not enlarged. Uvula midline, rises symmetrically. No abscess. No James Angina.   Neck: Supple, FROM. No masses.   Cardio: Rate and rhythm regular. No audible murmur, gallop, or rub.  Resp: Normal AP to lateral diameter, symmetrical excursion b/l. Breath sounds vesicular, symmetrical and without rales, rhonchi or wheezing b/l.  MSK: MAEx4. FROM.  Neuro: A&O3. No motor/sensory deficits.

## 2021-08-23 NOTE — ED PROVIDER NOTE - NSFOLLOWUPCLINICS_GEN_ALL_ED_FT
[facility]  Dental  [address] [place] [id number]  Phone: [phone]  Fax:     [facility] [place] [address]  Phone: [phone]  Fax:

## 2021-08-23 NOTE — ED PROVIDER NOTE - CLINICAL SUMMARY MEDICAL DECISION MAKING FREE TEXT BOX
42 yo female PMHx asthma, HTN presents to ED c/o toothache x1 week. Afebrile, tolerating secretions, nontoxic. No abscess. Poor dentition. Abx, pain control. Follow up as outpatient.

## 2021-09-01 ENCOUNTER — OUTPATIENT (OUTPATIENT)
Dept: OUTPATIENT SERVICES | Facility: HOSPITAL | Age: 41
LOS: 1 days | End: 2021-09-01
Payer: COMMERCIAL

## 2021-09-01 VITALS
TEMPERATURE: 97 F | RESPIRATION RATE: 20 BRPM | OXYGEN SATURATION: 99 % | DIASTOLIC BLOOD PRESSURE: 80 MMHG | WEIGHT: 142.42 LBS | HEART RATE: 20 BPM | SYSTOLIC BLOOD PRESSURE: 110 MMHG | HEIGHT: 63 IN

## 2021-09-01 DIAGNOSIS — Z98.890 OTHER SPECIFIED POSTPROCEDURAL STATES: Chronic | ICD-10-CM

## 2021-09-01 DIAGNOSIS — Z29.9 ENCOUNTER FOR PROPHYLACTIC MEASURES, UNSPECIFIED: ICD-10-CM

## 2021-09-01 DIAGNOSIS — Z01.818 ENCOUNTER FOR OTHER PREPROCEDURAL EXAMINATION: ICD-10-CM

## 2021-09-01 DIAGNOSIS — R59.0 LOCALIZED ENLARGED LYMPH NODES: ICD-10-CM

## 2021-09-01 LAB
ALBUMIN SERPL ELPH-MCNC: 4.2 G/DL — SIGNIFICANT CHANGE UP (ref 3.3–5.2)
ALP SERPL-CCNC: 43 U/L — SIGNIFICANT CHANGE UP (ref 40–120)
ALT FLD-CCNC: 21 U/L — SIGNIFICANT CHANGE UP
ANION GAP SERPL CALC-SCNC: 11 MMOL/L — SIGNIFICANT CHANGE UP (ref 5–17)
APTT BLD: 25.8 SEC — LOW (ref 27.5–35.5)
AST SERPL-CCNC: 29 U/L — SIGNIFICANT CHANGE UP
BASOPHILS # BLD AUTO: 0.04 K/UL — SIGNIFICANT CHANGE UP (ref 0–0.2)
BASOPHILS NFR BLD AUTO: 0.6 % — SIGNIFICANT CHANGE UP (ref 0–2)
BILIRUB SERPL-MCNC: 0.3 MG/DL — LOW (ref 0.4–2)
BUN SERPL-MCNC: 7.3 MG/DL — LOW (ref 8–20)
CALCIUM SERPL-MCNC: 9.3 MG/DL — SIGNIFICANT CHANGE UP (ref 8.6–10.2)
CHLORIDE SERPL-SCNC: 103 MMOL/L — SIGNIFICANT CHANGE UP (ref 98–107)
CO2 SERPL-SCNC: 26 MMOL/L — SIGNIFICANT CHANGE UP (ref 22–29)
CREAT SERPL-MCNC: 0.67 MG/DL — SIGNIFICANT CHANGE UP (ref 0.5–1.3)
EOSINOPHIL # BLD AUTO: 0.17 K/UL — SIGNIFICANT CHANGE UP (ref 0–0.5)
EOSINOPHIL NFR BLD AUTO: 2.6 % — SIGNIFICANT CHANGE UP (ref 0–6)
GLUCOSE SERPL-MCNC: 92 MG/DL — SIGNIFICANT CHANGE UP (ref 70–99)
HCG SERPL-ACNC: <4 MIU/ML — SIGNIFICANT CHANGE UP
HCT VFR BLD CALC: 39.5 % — SIGNIFICANT CHANGE UP (ref 34.5–45)
HGB BLD-MCNC: 14 G/DL — SIGNIFICANT CHANGE UP (ref 11.5–15.5)
IMM GRANULOCYTES NFR BLD AUTO: 0.2 % — SIGNIFICANT CHANGE UP (ref 0–1.5)
INR BLD: 0.95 RATIO — SIGNIFICANT CHANGE UP (ref 0.88–1.16)
LYMPHOCYTES # BLD AUTO: 2.79 K/UL — SIGNIFICANT CHANGE UP (ref 1–3.3)
LYMPHOCYTES # BLD AUTO: 43.1 % — SIGNIFICANT CHANGE UP (ref 13–44)
MCHC RBC-ENTMCNC: 31.9 PG — SIGNIFICANT CHANGE UP (ref 27–34)
MCHC RBC-ENTMCNC: 35.4 GM/DL — SIGNIFICANT CHANGE UP (ref 32–36)
MCV RBC AUTO: 90 FL — SIGNIFICANT CHANGE UP (ref 80–100)
MONOCYTES # BLD AUTO: 0.5 K/UL — SIGNIFICANT CHANGE UP (ref 0–0.9)
MONOCYTES NFR BLD AUTO: 7.7 % — SIGNIFICANT CHANGE UP (ref 2–14)
NEUTROPHILS # BLD AUTO: 2.97 K/UL — SIGNIFICANT CHANGE UP (ref 1.8–7.4)
NEUTROPHILS NFR BLD AUTO: 45.8 % — SIGNIFICANT CHANGE UP (ref 43–77)
PLATELET # BLD AUTO: 303 K/UL — SIGNIFICANT CHANGE UP (ref 150–400)
POTASSIUM SERPL-MCNC: 4 MMOL/L — SIGNIFICANT CHANGE UP (ref 3.5–5.3)
POTASSIUM SERPL-SCNC: 4 MMOL/L — SIGNIFICANT CHANGE UP (ref 3.5–5.3)
PROT SERPL-MCNC: 7.2 G/DL — SIGNIFICANT CHANGE UP (ref 6.6–8.7)
PROTHROM AB SERPL-ACNC: 11.1 SEC — SIGNIFICANT CHANGE UP (ref 10.6–13.6)
RBC # BLD: 4.39 M/UL — SIGNIFICANT CHANGE UP (ref 3.8–5.2)
RBC # FLD: 12.1 % — SIGNIFICANT CHANGE UP (ref 10.3–14.5)
SARS-COV-2 RNA SPEC QL NAA+PROBE: SIGNIFICANT CHANGE UP
SODIUM SERPL-SCNC: 140 MMOL/L — SIGNIFICANT CHANGE UP (ref 135–145)
WBC # BLD: 6.48 K/UL — SIGNIFICANT CHANGE UP (ref 3.8–10.5)
WBC # FLD AUTO: 6.48 K/UL — SIGNIFICANT CHANGE UP (ref 3.8–10.5)

## 2021-09-01 PROCEDURE — 93005 ELECTROCARDIOGRAM TRACING: CPT

## 2021-09-01 PROCEDURE — U0005: CPT

## 2021-09-01 PROCEDURE — 85730 THROMBOPLASTIN TIME PARTIAL: CPT

## 2021-09-01 PROCEDURE — 93010 ELECTROCARDIOGRAM REPORT: CPT

## 2021-09-01 PROCEDURE — G0463: CPT

## 2021-09-01 PROCEDURE — U0003: CPT

## 2021-09-01 PROCEDURE — 85025 COMPLETE CBC W/AUTO DIFF WBC: CPT

## 2021-09-01 PROCEDURE — 80053 COMPREHEN METABOLIC PANEL: CPT

## 2021-09-01 PROCEDURE — 36415 COLL VENOUS BLD VENIPUNCTURE: CPT

## 2021-09-01 PROCEDURE — 84702 CHORIONIC GONADOTROPIN TEST: CPT

## 2021-09-01 PROCEDURE — 85610 PROTHROMBIN TIME: CPT

## 2021-09-01 RX ORDER — LOSARTAN POTASSIUM 100 MG/1
1 TABLET, FILM COATED ORAL
Qty: 0 | Refills: 0 | DISCHARGE

## 2021-09-01 RX ORDER — ALBUTEROL 90 UG/1
2 AEROSOL, METERED ORAL
Qty: 0 | Refills: 0 | DISCHARGE

## 2021-09-01 NOTE — ASU PATIENT PROFILE, ADULT - VISION (WITH CORRECTIVE LENSES IF THE PATIENT USUALLY WEARS THEM):
reading / distance glasses/Partially impaired: cannot see medication labels or newsprint, but can see obstacles in path, and the surrounding layout; can count fingers at arm's length

## 2021-09-01 NOTE — H&P PST ADULT - SOURCE OF INFORMATION, PROFILE
patient pt. states she is proficient in English and [place] and comfortable with both languages./patient

## 2021-09-01 NOTE — H&P PST ADULT - HISTORY OF PRESENT ILLNESS
42 y/o female presents today to PST pending upcoming US guided left neck lymph node biopsy secondary to localized enlarged lymph nodes on 9/3/21. Pt. with history of COVID 19 infection, asthma, HTN. Pt. states over the past few months she developed discomfort in the left side of her neck. Pt. is s/p COVID 19 vaccine x 2 with pfizer as of 4/6/21. Pt. was recently in [facility] ER for tooth pain and was started and remains on clindamycin. Pt. states she requires tooth extraction which is scheduled for next week. Pt. denies CP or SOB.  42 y/o female presents today to PST pending upcoming US guided left neck lymph node biopsy secondary to localized enlarged lymph nodes on 9/3/21 with Dr. Hong. Pt. with history of COVID 19 infection, asthma, HTN. Pt. states over the past few months she developed discomfort in the left side of her neck. Pt. is s/p COVID 19 vaccine x 2 with pfizer as of 4/6/21. Pt. was recently in [facility] ER for tooth pain and was started and remains on clindamycin. Pt. states she requires tooth extraction which is scheduled for next week. Pt. denies CP or SOB.

## 2021-09-01 NOTE — H&P PST ADULT - DENTITION
left side tooth ache, requires extraction. Tooth is loose. Denies any other loose or broken teeth. No dentures or bridges./normal

## 2021-09-01 NOTE — H&P PST ADULT - NSICDXFAMILYHX_GEN_ALL_CORE_FT
FAMILY HISTORY:  Father  Still living? Yes, Estimated age: Age Unknown  FH: type 2 diabetes, Age at diagnosis: Age Unknown

## 2021-09-01 NOTE — H&P PST ADULT - ASSESSMENT
42 y/o female presents today to PST pending upcoming US guided left neck lymph node biopsy secondary to localized enlarged lymph nodes on 9/3/21 with Dr. Hong. Pt. with history of COVID 19 infection, asthma, HTN. Pt. states over the past few months she developed discomfort in the left side of her neck. Pt. is s/p COVID 19 vaccine x 2 with pfizer as of 21. Pt. was recently in [facility] ER for tooth pain and was started and remains on clindamycin. Pt. states she requires tooth extraction which is scheduled for next week. Pt. denies CP or SOB.     Patient educated on surgical scrub, COVID testing completed today in PST 21 and pending, preadmission instructions, and day of procedure medications as per policy, pt. verbalized agreement and understanding.   Pt. educated via both verbal and written communication regarding preoperative instructions/education as per policy. Pt. verbalized agreement and understanding.   Pt instructed to stop vitamins/supplements/herbal medications/NSAIDS as of today 1 week prior to surgery. Pt. verbalized agreement and understanding.     OPIOID RISK TOOL    BIANCA EACH BOX THAT APPLIES AND ADD TOTALS AT THE END    FAMILY HISTORY OF SUBSTANCE ABUSE                 FEMALE         MALE                                                Alcohol                             [  ]1 pt          [  ]3pts                                               Illegal Durgs                     [  ]2 pts        [  ]3pts                                               Rx Drugs                           [  ]4 pts        [  ]4 pts    PERSONAL HISTORY OF SUBSTANCE ABUSE                                                                                          Alcohol                             [  ]3 pts       [  ]3 pts                                               Illegal Drugs                     [  ]4 pts        [  ]4 pts                                               Rx Drugs                           [  ]5 pts        [  ]5 pts    AGE BETWEEN 16-45 YEARS                                      [x  ]1 pt         [  ]1 pt    HISTORY OF PREADOLESCENT   SEXUAL ABUSE                                                             [  ]3 pts        [  ]0pts    PSYCHOLOGICAL DISEASE                     ADD, OCD, Bipolar, Schizophrenia        [  ]2 pts         [  ]2 pts                      Depression                                               [  ]1 pt           [  ]1 pt           SCORING TOTAL   (add numbers and type here)              (1)                                     A score of 3 or lower indicated LOW risk for future opioid abuse  A score of 4 to 7 indicated moderate risk for future opioid abuse  A score of 8 or higher indicates a high risk for opioid abuse    CAPRINI SCORE    AGE RELATED RISK FACTORS                                                             [ ] Age 41-60 years                                            (1 Point)  [ ] Age: 61-74 years                                           (2 Points)                 [ ] Age= 75 years                                                (3 Points)             DISEASE RELATED RISK FACTORS                                                       [ ] Edema in the lower extremities                 (1 Point)                     [ ] Varicose veins                                               (1 Point)                                 [x ] BMI > 25 Kg/m2                                            (1 Point)                                  [ ] Serious infection (ie PNA)                            (1 Point)                     [ ] Lung disease ( COPD, Emphysema)            (1 Point)                                                                          [ ] Acute myocardial infarction                         (1 Point)                  [ ] Congestive heart failure (in the previous month)  (1 Point)         [ ] Inflammatory bowel disease                            (1 Point)                  [ ] Central venous access, PICC or Port               (2 points)       (within the last month)                                                                [ ] Stroke (in the previous month)                        (5 Points)    [ ] Previous or present malignancy                       (2 points)                                                                                                                                                         HEMATOLOGY RELATED FACTORS                                                         [ ] Prior episodes of VTE                                     (3 Points)                     [ ] Positive family history for VTE                      (3 Points)                  [ ] Prothrombin 84308 A                                     (3 Points)                     [ ] Factor V Leiden                                                (3 Points)                        [ ] Lupus anticoagulants                                      (3 Points)                                                           [ ] Anticardiolipin antibodies                              (3 Points)                                                       [ ] High homocysteine in the blood                   (3 Points)                                             [ ] Other congenital or acquired thrombophilia      (3 Points)                                                [ ] Heparin induced thrombocytopenia                  (3 Points)                                        MOBILITY RELATED FACTORS  [ ] Bed rest                                                         (1 Point)  [ ] Plaster cast                                                    (2 points)  [ ] Bed bound for more than 72 hours           (2 Points)    GENDER SPECIFIC FACTORS  [ ] Pregnancy or had a baby within the last month   (1 Point)  [ ] Post-partum < 6 weeks                                   (1 Point)  [ ] Hormonal therapy  or oral contraception   (1 Point)  [ ] History of pregnancy complications              (1 point)  [ ] Unexplained or recurrent              (1 Point)    OTHER RISK FACTORS                                           (1 Point)  [ ] BMI >40, smoking, diabetes requiring insulin, chemotherapy  blood transfusions and length of surgery over 2 hours    SURGERY RELATED RISK FACTORS  [ ]  Section within the last month     (1 Point)  [x ] Minor surgery                                                  (1 Point)  [ ] Arthroscopic surgery                                       (2 Points)  [ ] Planned major surgery lasting more            (2 Points)      than 45 minutes     [ ] Elective hip or knee joint replacement       (5 points)       surgery                                                TRAUMA RELATED RISK FACTORS  [ ] Fracture of the hip, pelvis, or leg                       (5 Points)  [ ] Spinal cord injury resulting in paralysis             (5 points)       (in the previous month)    [ ] Paralysis  (less than 1 month)                             (5 Points)  [ ] Multiple Trauma within 1 month                        (5 Points)    Total Score [  2      ]    Caprini Score 0-2: Low Risk, NO VTE prophylaxis required for most patients, encourage ambulation  Caprini Score 3-6: Moderate Risk , pharmacologic VTE prophylaxis is indicated for most patients (in the absence of contraindications)  Caprini Score Greater than or =7: High risk, pharmocologic VTE prophylaxis indicated for most patients (in the absence of contraindications)

## 2021-09-01 NOTE — H&P PST ADULT - NSICDXPASTMEDICALHX_GEN_ALL_CORE_FT
PAST MEDICAL HISTORY:  2019 novel coronavirus disease (COVID-19)     Asthma     COVID-19 vaccine series completed pfizer x 2 4/6/21    High blood pressure     Tooth infection 8/2021

## 2021-09-01 NOTE — H&P PST ADULT - LAB RESULTS AND INTERPRETATION
Labs pending Labs pending   9/1/21 18:41 All available labs noted as documented. All abnormal labs faxed to PCP Dr. Lyon. Kayode MS, FNP-BC

## 2021-09-01 NOTE — H&P PST ADULT - PROBLEM SELECTOR PLAN 2
US guided left neck lymph node biopsy secondary to localized enlarged lymph nodes on 9/3/21 with Dr. Hong.

## 2021-09-01 NOTE — H&P PST ADULT - NSANTHOSAYNRD_GEN_A_CORE
No. GEO screening performed.  STOP BANG Legend: 0-2 = LOW Risk; 3-4 = INTERMEDIATE Risk; 5-8 = HIGH Risk

## 2021-09-01 NOTE — ASU PATIENT PROFILE, ADULT - LEARNING ASSESSMENT (PATIENT) ADDITIONAL COMMENTS
NP, instructed pt on pre-op instructions/teaching, tips for safer surgery, pain management scale, pre-surgical infection prevention instructions, covid swab pre-op done 9/1/21, pt verbalized understanding of all instructions given.

## 2021-09-03 ENCOUNTER — OUTPATIENT (OUTPATIENT)
Dept: OUTPATIENT SERVICES | Facility: HOSPITAL | Age: 41
LOS: 1 days | End: 2021-09-03
Payer: COMMERCIAL

## 2021-09-03 ENCOUNTER — RESULT REVIEW (OUTPATIENT)
Age: 41
End: 2021-09-03

## 2021-09-03 VITALS
WEIGHT: 139.99 LBS | SYSTOLIC BLOOD PRESSURE: 153 MMHG | RESPIRATION RATE: 14 BRPM | OXYGEN SATURATION: 100 % | HEART RATE: 57 BPM | DIASTOLIC BLOOD PRESSURE: 85 MMHG | TEMPERATURE: 97 F | HEIGHT: 64 IN

## 2021-09-03 DIAGNOSIS — Z98.890 OTHER SPECIFIED POSTPROCEDURAL STATES: Chronic | ICD-10-CM

## 2021-09-03 DIAGNOSIS — R59.0 LOCALIZED ENLARGED LYMPH NODES: ICD-10-CM

## 2021-09-03 PROCEDURE — 76942 ECHO GUIDE FOR BIOPSY: CPT | Mod: 26

## 2021-09-03 PROCEDURE — 88188 FLOWCYTOMETRY/READ 9-15: CPT

## 2021-09-03 PROCEDURE — 88184 FLOWCYTOMETRY/ TC 1 MARKER: CPT

## 2021-09-03 PROCEDURE — 38510 BIOPSY/REMOVAL LYMPH NODES: CPT

## 2021-09-03 PROCEDURE — 88305 TISSUE EXAM BY PATHOLOGIST: CPT

## 2021-09-03 PROCEDURE — 88341 IMHCHEM/IMCYTCHM EA ADD ANTB: CPT

## 2021-09-03 PROCEDURE — 88360 TUMOR IMMUNOHISTOCHEM/MANUAL: CPT | Mod: 26

## 2021-09-03 PROCEDURE — 88360 TUMOR IMMUNOHISTOCHEM/MANUAL: CPT

## 2021-09-03 PROCEDURE — 88185 FLOWCYTOMETRY/TC ADD-ON: CPT

## 2021-09-03 PROCEDURE — 88342 IMHCHEM/IMCYTCHM 1ST ANTB: CPT

## 2021-09-03 PROCEDURE — 88342 IMHCHEM/IMCYTCHM 1ST ANTB: CPT | Mod: 26,59

## 2021-09-03 PROCEDURE — 87205 SMEAR GRAM STAIN: CPT

## 2021-09-03 PROCEDURE — 76942 ECHO GUIDE FOR BIOPSY: CPT

## 2021-09-03 PROCEDURE — 88341 IMHCHEM/IMCYTCHM EA ADD ANTB: CPT | Mod: 26,59

## 2021-09-03 PROCEDURE — 38505 NEEDLE BIOPSY LYMPH NODES: CPT | Mod: LT

## 2021-09-03 PROCEDURE — 88305 TISSUE EXAM BY PATHOLOGIST: CPT | Mod: 26

## 2021-09-03 NOTE — PROGRESS NOTE ADULT - SUBJECTIVE AND OBJECTIVE BOX
IR Post Procedure Note    Diagnosis: L Cervical LAD    Procedure: L Cervical LN Bx    : Jong Hong MD    Contrast: None    Anesthesia: 1% Lidocaine Subcutaneous    Estimated Blood Loss: Less than 10cc    Specimens: Identified, Labeled, Confirmed and Sent to Lab    Complications: No Immediate Complications    Anticoagulation: Resume without Restriction    Findings & Plan: 4 18g core bx of L Cervical LN obtained.      Please call Interventional Radiology with any questions, concerns, or issues.

## 2021-09-03 NOTE — ASU DISCHARGE PLAN (ADULT/PEDIATRIC) - ASU DC SPECIAL INSTRUCTIONSFT
Biopsy/ Aspiration    Discharge Instructions  - You have had a biopsy of a lymph node.   - You may shower in 24 hours.  - Keep the area covered and dry for the next 24 hours.  - Do not perform any heavy lifting until the site is healed.  - You may resume your normal diet.  - You may resume your normal medications however you should wait 24 hours before restarting aspirin, plavix, or blood thinners.  - It is normal to experience some pain over the site for the next few days. You may take apply ice to the area (20 minutes on, 20 minutes off) and take Tylenol for that pain. Do not take more frequently than every 6 hours and do not exceed more than 3000mg of Tylenol in a 24 hour period.    Notify your primary physician and/or Interventional Radiology IMMEDIATELY if you experience any of the following       - Fever of 101F or 38C       - Chills or Rigors/ Shakes       - Swelling and/or Redness in the area around the biopsy site       - Worsening Pain       - Blood soaked bandages or worsening bleeding       - Lightheadedness and/or dizziness upon standing       - Chest Pain/ Tightness       - Shortness of Breath       - Difficulty walking    If you have a problem that you believe requires IMMEDIATE attention, please go to your NEAREST Emergency Room. If you believe your problem can safely wait until you speak to a physician, please call Interventional Radiology for any concerns.    During Normal Weekday Business Hours- You can contact the Interventional Radiology department during normal business hours via telephone.  During Evenings and Weekends- If you need to contact Interventional Radiology during off hours, do so by calling the hospital and requesting to be connected to the Interventional Radiologist on call.

## 2021-09-09 LAB — TM INTERPRETATION: SIGNIFICANT CHANGE UP

## 2021-09-10 LAB — SURGICAL PATHOLOGY STUDY: SIGNIFICANT CHANGE UP

## 2021-09-17 PROBLEM — K04.7 PERIAPICAL ABSCESS WITHOUT SINUS: Chronic | Status: ACTIVE | Noted: 2021-09-01

## 2021-09-17 PROBLEM — U07.1 COVID-19: Chronic | Status: ACTIVE | Noted: 2021-09-01

## 2021-09-17 PROBLEM — Z92.29 PERSONAL HISTORY OF OTHER DRUG THERAPY: Chronic | Status: ACTIVE | Noted: 2021-09-01

## 2021-09-19 PROBLEM — R59.0 SUPRACLAVICULAR ADENOPATHY: Status: ACTIVE | Noted: 2021-07-19

## 2021-09-20 ENCOUNTER — APPOINTMENT (OUTPATIENT)
Dept: THORACIC SURGERY | Facility: CLINIC | Age: 41
End: 2021-09-20
Payer: COMMERCIAL

## 2021-09-20 VITALS
BODY MASS INDEX: 23.9 KG/M2 | TEMPERATURE: 98.7 F | HEIGHT: 64 IN | DIASTOLIC BLOOD PRESSURE: 89 MMHG | WEIGHT: 140 LBS | RESPIRATION RATE: 18 BRPM | HEART RATE: 72 BPM | SYSTOLIC BLOOD PRESSURE: 138 MMHG | OXYGEN SATURATION: 100 %

## 2021-09-20 DIAGNOSIS — R59.0 LOCALIZED ENLARGED LYMPH NODES: ICD-10-CM

## 2021-09-20 PROCEDURE — 99214 OFFICE O/P EST MOD 30 MIN: CPT

## 2021-09-20 RX ORDER — NAPROXEN 500 MG/1
500 TABLET ORAL
Refills: 0 | Status: COMPLETED | COMMUNITY
End: 2021-09-20

## 2021-09-20 RX ORDER — IBUPROFEN 800 MG/1
800 TABLET, FILM COATED ORAL
Refills: 0 | Status: ACTIVE | COMMUNITY

## 2021-09-20 RX ORDER — ACETAMINOPHEN 325 MG/1
TABLET, FILM COATED ORAL
Refills: 0 | Status: ACTIVE | COMMUNITY

## 2021-09-20 NOTE — DATA REVIEWED
[FreeTextEntry1] : 9.3.21 Pathology \par Final Diagnosis\par 1.  Lymph node, cervical, left, core biopsy\par - Follicular lymphoma, grade 1-2\par \par \par 6.14.21 Fine Needle Aspiration Supraclavicular Neck Left  from [facility] \par - B cell population with a slightly predominant surface Kappa light chain expression. \par \par 5.22.21 CT Soft  Tissue Neck  with Contrast from R \par - Small cluster of borderline up 1.1 cm left level 5B left supraclavicular lymph nodes of which one has low density center suggesting cyst formation , necrosis. \par - No other neck mass lesion or enlarge lymph nodes.\par -Nonspecific effacement of the left piriform sinus without definite  focal mass/. \par -Mild /moderate bilateral maxillary and moderate ethmoid and mild lateral sphenoid sinus disease without evidence of acute sinusitis

## 2021-09-20 NOTE — CONSULT LETTER
[Dear  ___] : Dear  [unfilled], [Courtesy Letter:] : I had the pleasure of seeing your patient, [unfilled], in my office today. [Please see my note below.] : Please see my note below. [Consult Closing:] : Thank you very much for allowing me to participate in the care of this patient.  If you have any questions, please do not hesitate to contact me. [Sincerely,] : Sincerely, [FreeTextEntry2] : Dr. Galileo Elizabeth  [FreeTextEntry3] : Meliton Rivera MD\par Department of Cardiovascular and Thoracic Surgery\par \par [employer]

## 2021-09-20 NOTE — REVIEW OF SYSTEMS
[As Noted in HPI] : as noted in HPI [Negative] : Heme/Lymph [FreeTextEntry4] : Recent teeth removal was prescribed antibiotic.Denies symptoms

## 2021-09-20 NOTE — PHYSICAL EXAM
[FreeTextEntry1] : Mild swelling in the left supraclavicular fossa [] : no respiratory distress [Auscultation Breath Sounds / Voice Sounds] : lungs were clear to auscultation bilaterally [Heart Rate And Rhythm] : heart rate was normal and rhythm regular [Heart Sounds] : normal S1 and S2 [Heart Sounds Gallop] : no gallops [Murmurs] : no murmurs [Heart Sounds Pericardial Friction Rub] : no pericardial rub [Examination Of The Chest] : the chest was normal in appearance [Chest Visual Inspection Thoracic Asymmetry] : no chest asymmetry [Diminished Respiratory Excursion] : normal chest expansion [No Spinal Tenderness] : no spinal tenderness [No Focal Deficits] : no focal deficits [Oriented To Time, Place, And Person] : oriented to person, place, and time [Impaired Insight] : insight and judgment were intact [Affect] : the affect was normal

## 2021-09-20 NOTE — ASSESSMENT
Pt to rm G39 with caregiver   [FreeTextEntry1] : Flower is a 41-year-old female with cervical adenopathy. He recently underwent a needle biopsy had demonstrated follicular lymphoma. I will be referring her to oncology for further recommendations regarding treatment.\par \par Thank you for allowing me to participate in the care of your patient.\par \par 30 minutes was spent during this encounter.\par \par Meliotn Rivera MD\par Department of Cardiovascular and Thoracic Surgery\par \par [employer]par [facility] at [facility]\par

## 2021-09-22 ENCOUNTER — OUTPATIENT (OUTPATIENT)
Dept: OUTPATIENT SERVICES | Facility: HOSPITAL | Age: 41
LOS: 1 days | Discharge: HOME | End: 2021-09-22

## 2021-09-22 DIAGNOSIS — C82.90 FOLLICULAR LYMPHOMA, UNSPECIFIED, UNSPECIFIED SITE: ICD-10-CM

## 2021-09-22 DIAGNOSIS — Z98.890 OTHER SPECIFIED POSTPROCEDURAL STATES: Chronic | ICD-10-CM

## 2021-09-27 ENCOUNTER — RESULT REVIEW (OUTPATIENT)
Age: 41
End: 2021-09-27

## 2021-09-27 ENCOUNTER — APPOINTMENT (OUTPATIENT)
Dept: HEMATOLOGY ONCOLOGY | Facility: CLINIC | Age: 41
End: 2021-09-27
Payer: COMMERCIAL

## 2021-09-27 VITALS
HEIGHT: 60 IN | WEIGHT: 140.44 LBS | OXYGEN SATURATION: 100 % | HEART RATE: 88 BPM | DIASTOLIC BLOOD PRESSURE: 91 MMHG | SYSTOLIC BLOOD PRESSURE: 142 MMHG | BODY MASS INDEX: 27.57 KG/M2

## 2021-09-27 LAB
BASOPHILS # BLD AUTO: 0.1 K/UL — SIGNIFICANT CHANGE UP (ref 0–0.2)
BASOPHILS NFR BLD AUTO: 1.1 % — SIGNIFICANT CHANGE UP (ref 0–2)
EOSINOPHIL # BLD AUTO: 0.3 K/UL — SIGNIFICANT CHANGE UP (ref 0–0.5)
EOSINOPHIL NFR BLD AUTO: 3.1 % — SIGNIFICANT CHANGE UP (ref 0–6)
HCT VFR BLD CALC: 38.5 % — SIGNIFICANT CHANGE UP (ref 34.5–45)
HGB BLD-MCNC: 13.3 G/DL — SIGNIFICANT CHANGE UP (ref 11.5–15.5)
LYMPHOCYTES # BLD AUTO: 3.1 K/UL — SIGNIFICANT CHANGE UP (ref 1–3.3)
LYMPHOCYTES # BLD AUTO: 38.1 % — SIGNIFICANT CHANGE UP (ref 13–44)
MCHC RBC-ENTMCNC: 31.5 PG — SIGNIFICANT CHANGE UP (ref 27–34)
MCHC RBC-ENTMCNC: 34.7 G/DL — SIGNIFICANT CHANGE UP (ref 32–36)
MCV RBC AUTO: 90.8 FL — SIGNIFICANT CHANGE UP (ref 80–100)
MONOCYTES # BLD AUTO: 0.5 K/UL — SIGNIFICANT CHANGE UP (ref 0–0.9)
MONOCYTES NFR BLD AUTO: 5.6 % — SIGNIFICANT CHANGE UP (ref 2–14)
NEUTROPHILS # BLD AUTO: 4.3 K/UL — SIGNIFICANT CHANGE UP (ref 1.8–7.4)
NEUTROPHILS NFR BLD AUTO: 52.1 % — SIGNIFICANT CHANGE UP (ref 43–77)
PLATELET # BLD AUTO: 275 K/UL — SIGNIFICANT CHANGE UP (ref 150–400)
RBC # BLD: 4.24 M/UL — SIGNIFICANT CHANGE UP (ref 3.8–5.2)
RBC # FLD: 10.8 % — SIGNIFICANT CHANGE UP (ref 10.3–14.5)
WBC # BLD: 8.2 K/UL — SIGNIFICANT CHANGE UP (ref 3.8–10.5)
WBC # FLD AUTO: 8.2 K/UL — SIGNIFICANT CHANGE UP (ref 3.8–10.5)

## 2021-09-27 PROCEDURE — 99205 OFFICE O/P NEW HI 60 MIN: CPT

## 2021-09-27 RX ORDER — CYCLOBENZAPRINE HYDROCHLORIDE 10 MG/1
10 TABLET, FILM COATED ORAL
Qty: 30 | Refills: 0 | Status: DISCONTINUED | COMMUNITY
Start: 2021-08-05

## 2021-09-27 RX ORDER — NEOMYCIN SULFATE, POLYMYXIN B SULFATE AND HYDROCORTISONE 3.5; 10000; 1 MG/ML; [IU]/ML; MG/ML
3.5-10000-1 SOLUTION AURICULAR (OTIC)
Qty: 10 | Refills: 0 | Status: DISCONTINUED | COMMUNITY
Start: 2021-05-03

## 2021-09-27 RX ORDER — FLUTICASONE FUROATE, UMECLIDINIUM BROMIDE AND VILANTEROL TRIFENATATE 200; 62.5; 25 UG/1; UG/1; UG/1
200-62.5-25 POWDER RESPIRATORY (INHALATION)
Refills: 0 | Status: ACTIVE | COMMUNITY
Start: 2021-09-27

## 2021-09-27 RX ORDER — ETONOGESTREL AND ETHINYL ESTRADIOL .12; .015 MG/D; MG/D
0.12-0.015 INSERT, EXTENDED RELEASE VAGINAL
Qty: 2 | Refills: 3 | Status: DISCONTINUED | COMMUNITY
Start: 2019-01-25 | End: 2021-09-27

## 2021-09-27 RX ORDER — CHLORHEXIDINE GLUCONATE, 0.12% ORAL RINSE 1.2 MG/ML
0.12 SOLUTION DENTAL
Qty: 473 | Refills: 0 | Status: DISCONTINUED | COMMUNITY
Start: 2021-09-10

## 2021-09-27 RX ORDER — CLINDAMYCIN HYDROCHLORIDE 300 MG/1
300 CAPSULE ORAL
Refills: 0 | Status: DISCONTINUED | COMMUNITY
End: 2021-09-27

## 2021-09-27 RX ORDER — ETONOGESTREL AND ETHINYL ESTRADIOL .12; .015 MG/D; MG/D
0.12-0.015 INSERT, EXTENDED RELEASE VAGINAL
Qty: 3 | Refills: 3 | Status: DISCONTINUED | COMMUNITY
Start: 2019-10-09 | End: 2021-09-27

## 2021-09-28 ENCOUNTER — EMERGENCY (EMERGENCY)
Facility: HOSPITAL | Age: 41
LOS: 1 days | Discharge: HOME | End: 2021-09-28
Attending: EMERGENCY MEDICINE
Payer: COMMERCIAL

## 2021-09-28 ENCOUNTER — NON-APPOINTMENT (OUTPATIENT)
Age: 41
End: 2021-09-28

## 2021-09-28 VITALS
RESPIRATION RATE: 16 BRPM | HEART RATE: 83 BPM | TEMPERATURE: 99 F | HEIGHT: 64 IN | OXYGEN SATURATION: 99 % | DIASTOLIC BLOOD PRESSURE: 96 MMHG | WEIGHT: 139.99 LBS | SYSTOLIC BLOOD PRESSURE: 154 MMHG

## 2021-09-28 DIAGNOSIS — Z98.890 OTHER SPECIFIED POSTPROCEDURAL STATES: Chronic | ICD-10-CM

## 2021-09-28 LAB
ALBUMIN SERPL ELPH-MCNC: 4.1 G/DL — SIGNIFICANT CHANGE UP (ref 3.3–5.2)
ALBUMIN SERPL ELPH-MCNC: 4.4 G/DL
ALP BLD-CCNC: 47 U/L
ALP SERPL-CCNC: 45 U/L — SIGNIFICANT CHANGE UP (ref 40–120)
ALT FLD-CCNC: 16 U/L — SIGNIFICANT CHANGE UP
ALT SERPL-CCNC: 14 U/L
ANION GAP SERPL CALC-SCNC: 12 MMOL/L
ANION GAP SERPL CALC-SCNC: 12 MMOL/L — SIGNIFICANT CHANGE UP (ref 5–17)
APTT BLD: 27 SEC
AST SERPL-CCNC: 18 U/L
AST SERPL-CCNC: 21 U/L — SIGNIFICANT CHANGE UP
B2 MICROGLOB SERPL-MCNC: 1.4 MG/L
BASOPHILS # BLD AUTO: 0.06 K/UL — SIGNIFICANT CHANGE UP (ref 0–0.2)
BASOPHILS NFR BLD AUTO: 1 % — SIGNIFICANT CHANGE UP (ref 0–2)
BILIRUB SERPL-MCNC: 0.2 MG/DL
BILIRUB SERPL-MCNC: 0.2 MG/DL — LOW (ref 0.4–2)
BUN SERPL-MCNC: 11 MG/DL
BUN SERPL-MCNC: 7.3 MG/DL — LOW (ref 8–20)
CALCIUM SERPL-MCNC: 9.1 MG/DL
CALCIUM SERPL-MCNC: 9.1 MG/DL — SIGNIFICANT CHANGE UP (ref 8.6–10.2)
CHLORIDE SERPL-SCNC: 105 MMOL/L
CHLORIDE SERPL-SCNC: 105 MMOL/L — SIGNIFICANT CHANGE UP (ref 98–107)
CO2 SERPL-SCNC: 23 MMOL/L
CO2 SERPL-SCNC: 23 MMOL/L — SIGNIFICANT CHANGE UP (ref 22–29)
CREAT SERPL-MCNC: 0.58 MG/DL — SIGNIFICANT CHANGE UP (ref 0.5–1.3)
CREAT SERPL-MCNC: 0.71 MG/DL
D DIMER BLD IA.RAPID-MCNC: 181 NG/ML DDU — SIGNIFICANT CHANGE UP
EOSINOPHIL # BLD AUTO: 0.22 K/UL — SIGNIFICANT CHANGE UP (ref 0–0.5)
EOSINOPHIL NFR BLD AUTO: 3.5 % — SIGNIFICANT CHANGE UP (ref 0–6)
GLUCOSE SERPL-MCNC: 90 MG/DL — SIGNIFICANT CHANGE UP (ref 70–99)
HCG SERPL-ACNC: <4 MIU/ML — SIGNIFICANT CHANGE UP
HCT VFR BLD CALC: 37.6 % — SIGNIFICANT CHANGE UP (ref 34.5–45)
HGB BLD-MCNC: 13.3 G/DL — SIGNIFICANT CHANGE UP (ref 11.5–15.5)
IMM GRANULOCYTES NFR BLD AUTO: 0.2 % — SIGNIFICANT CHANGE UP (ref 0–1.5)
INR PPP: 0.99 RATIO
LDH SERPL-CCNC: 223 U/L
LYMPHOCYTES # BLD AUTO: 2.82 K/UL — SIGNIFICANT CHANGE UP (ref 1–3.3)
LYMPHOCYTES # BLD AUTO: 45.3 % — HIGH (ref 13–44)
MCHC RBC-ENTMCNC: 31.7 PG — SIGNIFICANT CHANGE UP (ref 27–34)
MCHC RBC-ENTMCNC: 35.4 GM/DL — SIGNIFICANT CHANGE UP (ref 32–36)
MCV RBC AUTO: 89.5 FL — SIGNIFICANT CHANGE UP (ref 80–100)
MONOCYTES # BLD AUTO: 0.52 K/UL — SIGNIFICANT CHANGE UP (ref 0–0.9)
MONOCYTES NFR BLD AUTO: 8.3 % — SIGNIFICANT CHANGE UP (ref 2–14)
NEUTROPHILS # BLD AUTO: 2.6 K/UL — SIGNIFICANT CHANGE UP (ref 1.8–7.4)
NEUTROPHILS NFR BLD AUTO: 41.7 % — LOW (ref 43–77)
PLATELET # BLD AUTO: 283 K/UL — SIGNIFICANT CHANGE UP (ref 150–400)
POTASSIUM SERPL-MCNC: 3.9 MMOL/L — SIGNIFICANT CHANGE UP (ref 3.5–5.3)
POTASSIUM SERPL-SCNC: 3.7 MMOL/L
POTASSIUM SERPL-SCNC: 3.9 MMOL/L — SIGNIFICANT CHANGE UP (ref 3.5–5.3)
PROT SERPL-MCNC: 6.7 G/DL
PROT SERPL-MCNC: 7.1 G/DL — SIGNIFICANT CHANGE UP (ref 6.6–8.7)
PT BLD: 11.8 SEC
RBC # BLD: 4.2 M/UL — SIGNIFICANT CHANGE UP (ref 3.8–5.2)
RBC # FLD: 12.1 % — SIGNIFICANT CHANGE UP (ref 10.3–14.5)
SODIUM SERPL-SCNC: 140 MMOL/L
SODIUM SERPL-SCNC: 140 MMOL/L — SIGNIFICANT CHANGE UP (ref 135–145)
TROPONIN T SERPL-MCNC: <0.01 NG/ML — SIGNIFICANT CHANGE UP (ref 0–0.06)
WBC # BLD: 6.23 K/UL — SIGNIFICANT CHANGE UP (ref 3.8–10.5)
WBC # FLD AUTO: 6.23 K/UL — SIGNIFICANT CHANGE UP (ref 3.8–10.5)

## 2021-09-28 PROCEDURE — 71046 X-RAY EXAM CHEST 2 VIEWS: CPT | Mod: 26

## 2021-09-28 PROCEDURE — 99283 EMERGENCY DEPT VISIT LOW MDM: CPT | Mod: 25

## 2021-09-28 PROCEDURE — 99285 EMERGENCY DEPT VISIT HI MDM: CPT

## 2021-09-28 PROCEDURE — 85025 COMPLETE CBC W/AUTO DIFF WBC: CPT

## 2021-09-28 PROCEDURE — 84702 CHORIONIC GONADOTROPIN TEST: CPT

## 2021-09-28 PROCEDURE — 71046 X-RAY EXAM CHEST 2 VIEWS: CPT

## 2021-09-28 PROCEDURE — 80053 COMPREHEN METABOLIC PANEL: CPT

## 2021-09-28 PROCEDURE — 93005 ELECTROCARDIOGRAM TRACING: CPT

## 2021-09-28 PROCEDURE — 36415 COLL VENOUS BLD VENIPUNCTURE: CPT

## 2021-09-28 PROCEDURE — 85379 FIBRIN DEGRADATION QUANT: CPT

## 2021-09-28 PROCEDURE — 93010 ELECTROCARDIOGRAM REPORT: CPT

## 2021-09-28 PROCEDURE — 84484 ASSAY OF TROPONIN QUANT: CPT

## 2021-09-28 NOTE — ED ADULT TRIAGE NOTE - CHIEF COMPLAINT QUOTE
Pt with h/o lymphoma started with SOB last night. she also now reports chest pain that started this morning. Sent in from PMD for eval.

## 2021-09-28 NOTE — ED PROVIDER NOTE - CLINICAL SUMMARY MEDICAL DECISION MAKING FREE TEXT BOX
41yF with pmh of asthma, htn, and lymphoma presenting with shortness of breath. Normal labs, check dimer and trop, CXR

## 2021-09-28 NOTE — PHYSICAL EXAM
[Normal] : affect appropriate [de-identified] : 1 cm L cervical lymphadenopathy  [de-identified] : clear [de-identified] : S1/S2 [de-identified] : no edema [de-identified] : soft, NT

## 2021-09-28 NOTE — ASSESSMENT
[FreeTextEntry1] : 41 year old female with with left cervical adenopathy, s/p core needle biopsy on 9/3/21 which demonstrated follicular lymphoma, grade 1-2.\par \par Today we discussed the available radiographic and pathologic data in detail.  We also discussed the natural history of the disease, as well as management options.  Discussed variable course, often waxing and waning in nature.  Discussed need for PET CT for initial staging.  Discussed treatment indications include B symptoms, symptomatic / bulky liz disease, compromise in organ function, cytopenias. Discussed possible ISRT if stage I disease. \par \par CBC today is normal: WBC 8.2, Hgb 13.3, HCT 38.5%, platelets 275K. \par \par \par Plan:\par PET/CT ordered, asap\par Post visit labs today\par Follow up in 3 weeks to discuss PET/CT

## 2021-09-28 NOTE — HISTORY OF PRESENT ILLNESS
[de-identified] : Ms. Nava is a 41 year old female was diagnosed with follicular lymphoma, grade 1-2 in September 2021.\par \par Patient palpated a lump on left side of neck, was evaluated by an ENT. \par  CT Soft Tissue Neck on 5/22/21 which demonstrated a small cluster of borderline enlarged lymph nodes up to 1.1 cm left level 5B and left supraclavicular lymph nodes. Underwent fine needle aspiration of left neck supraclavicular lymph node on 6/14/21. Evaluated by surgeon Dr. Rivera on 7/20/21. On 9/3/21 underwent left cervical lymph node core biopsy that demonstrated follicular lymphoma, grade 1-2.\par \par 06/14/2021 Fine needle aspiration, Supraclavicular neck, left level V - small lymphoid population, cannot r/o lymphoproliferative disorder. \par \par \par 09/03/2021 Underwent Lymph node, cervical, left, core biopsy\par - Follicular lymphoma, grade 1-2\par See note and description.\par \par Diagnostic note:  The morphologic and immunophenotypic findings are consistent with follicular lymphoma, grade 1 to 2 of 3.\par However the findings in this core biopsy may not be representative of the entire lesion. Suggest correlation with\par clinical findings and other ancillary studies.\par Microscopic description: Histologic sections of core biopsy show architecture effacement by proliferation of lymphoid\par follicles/nodules with thin mantle zone. Most of the follicles are back-to-back. The follicular architecture predominates. The follicles consist predominantly of centrocytes with few large cells.\par \par Immunohistochemical stains for CD3, CD5, CD20, PAX5, CD10, BCL6,\par BCL2, MUM1, Ki67, CyclinD1, CD21, CD23 stains performed on\par formalin fixed paraffin embedded tissue, block 1B.\par \par Neoplastic cells are:  _\par Positive:  CD20, PAX-5, CD10 (subset), BCL-6, BCL-2\par Negative:  Cyclin-D1, MUM-1\par Other:  Ki-67 is less than 5% of neoplastic cells. CD21 and CD23\par highlight the loose meshwork of follicular dendritic cells. CD3\par and CD5 highlight T-cells.\par Flow cytometry analysis:  Monotypic B-cells (22% of cells),\par positive for kappa, CD19, CD20, CD10; negative CD5.\par \par Denies weight loss in the last 6 months \par No fevers\par No infections\par No night sweats \par No pain. No joint pain  \par Denies neck pain \par No difficulty breathing \par Denies abdominal pain, nausea, vomiting, constipation \par Reports dry mouth for the past 2 months\par No dry eyes \par \par PMH: Asthma, Hypertension \par SH: Right axillary lymph node biopsy (2017)\par Social History: Working in electronics. Denies history smoking. \par PCP: Dr. Doe Lyon ([place], [place])\par Completed COVID-19 Vaccine series

## 2021-09-28 NOTE — CONSULT LETTER
[Dear  ___] : Dear  [unfilled], [Consult Letter:] : I had the pleasure of evaluating your patient, [unfilled]. [Please see my note below.] : Please see my note below. [Consult Closing:] : Thank you very much for allowing me to participate in the care of this patient.  If you have any questions, please do not hesitate to contact me. [Sincerely,] : Sincerely, [FreeTextEntry3] : Allan Joel MD\par Medical Oncology/Hematology\par [facility]\par \par \par [employer] at [facility] [place]\par

## 2021-09-28 NOTE — ED PROVIDER NOTE - PROGRESS NOTE DETAILS
D-ilda and trop negative; Other labs wnl. Patient reports improved symptoms. Results communicated and strict return precautions given. Agrees with plan to follow-up with PCP and hem/onc D-ilda and trop negative; Other labs wnl. Patient reports improved symptoms. Likely anxiety-related. Results communicated and strict return precautions given. Agrees with plan to follow-up with PCP and hem/onc

## 2021-09-28 NOTE — ED PROVIDER NOTE - NSICDXPASTMEDICALHX_GEN_ALL_CORE_FT
PAST MEDICAL HISTORY:  2019 novel coronavirus disease (COVID-19)     Asthma     COVID-19 vaccine series completed pfizer x 2 4/6/21    High blood pressure     Lymphoma     Tooth infection 8/2021

## 2021-09-28 NOTE — ED ADULT NURSE NOTE - OBJECTIVE STATEMENT
Patient A&Ox4, denies any pain or discomfort. Stated comes to ED from cancer center due to hypoxia. Stated was told her O2 levels were low & to come. Stated was SOB last night.  in progress, SpO2 99% room air. Cardiac monitor in place. Stated has Hx of lymphoma on left side of neck. Respirations even & unlabored. Stated is supposed to receive further testing next week to decide plan of care. Denies any chest pain, shortness of breath, nausea or dizziness. Assessment & information obtained with  Farhana.

## 2021-09-28 NOTE — ED PROVIDER NOTE - PATIENT PORTAL LINK FT
You can access the FollowMyHealth Patient Portal offered by [facility] by registering at the following website: [email or web address]. By joining [employer]’s FollowMyHealth portal, you will also be able to view your health information using other applications (apps) compatible with our system.

## 2021-09-28 NOTE — ED PROVIDER NOTE - PHYSICAL EXAMINATION
Gen: no acute distress  Head: normocephalic, atraumatic  EENT: EOMI  Lung: no increased work of breathing, CTABL, no wheezing, rales, rhonchi  CV: normal s1/s2, rrr, 2+ radial pulses b/l  Abd: soft, non-tender, non-distended, no rebound tenderness  MSK: No edema, no visible deformities, full range of motion in all 4 extremities  Neuro: No focal neurologic deficits  Skin: No rash   Psych: normal affect Gen: no acute distress  Head: normocephalic, atraumatic  EENT: EOMI  Lung: no increased work of breathing, CTABL, no wheezing, rales, rhonchi  CV: normal s1/s2, rrr, 2+ radial pulses b/l  Abd: soft, non-tender, non-distended, no rebound tenderness  MSK: No edema, no visible deformities, full range of motion in all 4 extremities  Neuro: No focal neurologic deficits  Skin: No rash   Psych: normal affect.

## 2021-09-28 NOTE — ED PROVIDER NOTE - CARE PROVIDER_API CALL
Allan Joel)  Hematology; HospicePalliative Medicine; Internal Medicine; Medical Oncology  [address] [place] [address]  Phone: [phone]  Fax: [phone]  Follow Up Time:

## 2021-09-28 NOTE — ED PROVIDER NOTE - NS ED ROS FT
Gen: No fever, normal appetite  Resp: +shortness of breath; No cough  Cardiovascular: +chest pain  Gastroenteric: No nausea/vomiting  :  no dysuria  MS: No joint or muscle pain  Skin: No rashes  Neuro: +headache  Remainder negative, except as per the HPI

## 2021-10-01 ENCOUNTER — RESULT REVIEW (OUTPATIENT)
Age: 41
End: 2021-10-01

## 2021-10-03 ENCOUNTER — EMERGENCY (EMERGENCY)
Facility: HOSPITAL | Age: 41
LOS: 1 days | Discharge: HOME | End: 2021-10-03
Attending: EMERGENCY MEDICINE
Payer: COMMERCIAL

## 2021-10-03 VITALS
TEMPERATURE: 99 F | OXYGEN SATURATION: 100 % | RESPIRATION RATE: 19 BRPM | DIASTOLIC BLOOD PRESSURE: 100 MMHG | HEIGHT: 64 IN | HEART RATE: 70 BPM | WEIGHT: 139.99 LBS | SYSTOLIC BLOOD PRESSURE: 156 MMHG

## 2021-10-03 DIAGNOSIS — Z98.890 OTHER SPECIFIED POSTPROCEDURAL STATES: Chronic | ICD-10-CM

## 2021-10-03 LAB
ALBUMIN SERPL ELPH-MCNC: 4.1 G/DL — SIGNIFICANT CHANGE UP (ref 3.3–5.2)
ALP SERPL-CCNC: 48 U/L — SIGNIFICANT CHANGE UP (ref 40–120)
ALT FLD-CCNC: 25 U/L — SIGNIFICANT CHANGE UP
ANION GAP SERPL CALC-SCNC: 10 MMOL/L — SIGNIFICANT CHANGE UP (ref 5–17)
AST SERPL-CCNC: 30 U/L — SIGNIFICANT CHANGE UP
BASOPHILS # BLD AUTO: 0.05 K/UL — SIGNIFICANT CHANGE UP (ref 0–0.2)
BASOPHILS NFR BLD AUTO: 0.7 % — SIGNIFICANT CHANGE UP (ref 0–2)
BILIRUB SERPL-MCNC: 0.3 MG/DL — LOW (ref 0.4–2)
BUN SERPL-MCNC: 11.1 MG/DL — SIGNIFICANT CHANGE UP (ref 8–20)
CALCIUM SERPL-MCNC: 8.7 MG/DL — SIGNIFICANT CHANGE UP (ref 8.6–10.2)
CHLORIDE SERPL-SCNC: 103 MMOL/L — SIGNIFICANT CHANGE UP (ref 98–107)
CO2 SERPL-SCNC: 25 MMOL/L — SIGNIFICANT CHANGE UP (ref 22–29)
CREAT SERPL-MCNC: 0.68 MG/DL — SIGNIFICANT CHANGE UP (ref 0.5–1.3)
EOSINOPHIL # BLD AUTO: 0.4 K/UL — SIGNIFICANT CHANGE UP (ref 0–0.5)
EOSINOPHIL NFR BLD AUTO: 5.7 % — SIGNIFICANT CHANGE UP (ref 0–6)
GLUCOSE SERPL-MCNC: 92 MG/DL — SIGNIFICANT CHANGE UP (ref 70–99)
HCT VFR BLD CALC: 37.1 % — SIGNIFICANT CHANGE UP (ref 34.5–45)
HGB BLD-MCNC: 13.1 G/DL — SIGNIFICANT CHANGE UP (ref 11.5–15.5)
IMM GRANULOCYTES NFR BLD AUTO: 0.1 % — SIGNIFICANT CHANGE UP (ref 0–1.5)
LYMPHOCYTES # BLD AUTO: 3.68 K/UL — HIGH (ref 1–3.3)
LYMPHOCYTES # BLD AUTO: 52.1 % — HIGH (ref 13–44)
MCHC RBC-ENTMCNC: 32.1 PG — SIGNIFICANT CHANGE UP (ref 27–34)
MCHC RBC-ENTMCNC: 35.3 GM/DL — SIGNIFICANT CHANGE UP (ref 32–36)
MCV RBC AUTO: 90.9 FL — SIGNIFICANT CHANGE UP (ref 80–100)
MONOCYTES # BLD AUTO: 0.51 K/UL — SIGNIFICANT CHANGE UP (ref 0–0.9)
MONOCYTES NFR BLD AUTO: 7.2 % — SIGNIFICANT CHANGE UP (ref 2–14)
NEUTROPHILS # BLD AUTO: 2.41 K/UL — SIGNIFICANT CHANGE UP (ref 1.8–7.4)
NEUTROPHILS NFR BLD AUTO: 34.2 % — LOW (ref 43–77)
PLATELET # BLD AUTO: 259 K/UL — SIGNIFICANT CHANGE UP (ref 150–400)
POTASSIUM SERPL-MCNC: 4 MMOL/L — SIGNIFICANT CHANGE UP (ref 3.5–5.3)
POTASSIUM SERPL-SCNC: 4 MMOL/L — SIGNIFICANT CHANGE UP (ref 3.5–5.3)
PROT SERPL-MCNC: 7.1 G/DL — SIGNIFICANT CHANGE UP (ref 6.6–8.7)
RBC # BLD: 4.08 M/UL — SIGNIFICANT CHANGE UP (ref 3.8–5.2)
RBC # FLD: 12 % — SIGNIFICANT CHANGE UP (ref 10.3–14.5)
SODIUM SERPL-SCNC: 138 MMOL/L — SIGNIFICANT CHANGE UP (ref 135–145)
WBC # BLD: 7.06 K/UL — SIGNIFICANT CHANGE UP (ref 3.8–10.5)
WBC # FLD AUTO: 7.06 K/UL — SIGNIFICANT CHANGE UP (ref 3.8–10.5)

## 2021-10-03 PROCEDURE — 70450 CT HEAD/BRAIN W/O DYE: CPT | Mod: MB

## 2021-10-03 PROCEDURE — 96375 TX/PRO/DX INJ NEW DRUG ADDON: CPT

## 2021-10-03 PROCEDURE — 36415 COLL VENOUS BLD VENIPUNCTURE: CPT

## 2021-10-03 PROCEDURE — 80053 COMPREHEN METABOLIC PANEL: CPT

## 2021-10-03 PROCEDURE — 85025 COMPLETE CBC W/AUTO DIFF WBC: CPT

## 2021-10-03 PROCEDURE — 99284 EMERGENCY DEPT VISIT MOD MDM: CPT | Mod: 25

## 2021-10-03 PROCEDURE — 99285 EMERGENCY DEPT VISIT HI MDM: CPT

## 2021-10-03 PROCEDURE — 96374 THER/PROPH/DIAG INJ IV PUSH: CPT

## 2021-10-03 RX ORDER — METOCLOPRAMIDE HCL 10 MG
10 TABLET ORAL ONCE
Refills: 0 | Status: COMPLETED | OUTPATIENT
Start: 2021-10-03 | End: 2021-10-03

## 2021-10-03 RX ORDER — DIPHENHYDRAMINE HCL 50 MG
50 CAPSULE ORAL ONCE
Refills: 0 | Status: COMPLETED | OUTPATIENT
Start: 2021-10-03 | End: 2021-10-03

## 2021-10-03 RX ADMIN — Medication 10 MILLIGRAM(S): at 21:42

## 2021-10-03 RX ADMIN — Medication 50 MILLIGRAM(S): at 21:42

## 2021-10-03 NOTE — ED PROVIDER NOTE - CARE PROVIDER_API CALL
Cassius Rodriguez; PhD)  Neurology; Vascular Neurology  [address] [place] [address]  Phone: [phone]  Fax: [phone]  Follow Up Time:

## 2021-10-03 NOTE — ED PROVIDER NOTE - OBJECTIVE STATEMENT
pt is a 40 y/o female presenting to the ed for evaluation. pt states she started with a headache this morning, states across forehead denies injuries or trauma to the area. pt states she took tylenol at home and medicatino for BP losartan, but headache persisted. pt admits to nausea. pt states recent diagnosis of lymphoma, has not been started on chemo or radiation. pt denies cp sob neck pain fever vomiting back pain numbness or loss of sensation

## 2021-10-03 NOTE — ED ADULT TRIAGE NOTE - CHIEF COMPLAINT QUOTE
Pt. BIBA c/o headache generalized headache that began today.  Pt. took additional half dose of losartan at 1900 and tylenol at 1900 without relief of pain.  Pt. denies nausea, vomiting, or vision changes.  Pt. endorses lightheadedness.

## 2021-10-03 NOTE — ED ADULT TRIAGE NOTE - NSWEIGHTCALCTOOLDRUG_GEN_A_CORE
Progress Note - Infectious Disease   Isidoro Unger  73 y o  male MRN: [id number]  Unit/Bed#: [facility] 525-01 Encounter: [id number]      Impression/Recommendations:  1  Possible residual osteomyelitis of right foot   Patient is status post screw fixation right foot on 03/04, with operative bone scraping culture growing Enterobacter   Patient has been on p o  Doxycycline since and is tolerating it well  Clorinda Lis, he had a persistent open foot wound   Patient is now status post flap closure of right foot   With successful flap closure, there is no further bone exposed   The best course of action at this point is to have patient continue antibiotic for 6 weeks after flap closure  Antibiotic plan as in below        2  Open right foot wound   Patient is status post flap closure, requiring return to OR twice for revision due to congestion   Wound is dehiscing, with purulence previously  Culture purulence grew MDR Enterobacter  Dehiscence is worse today, with exposed screw  Change antibiotic to IV ertapenem  Repeat I&D per Plastic surgery  Patient may need prolonged IV antibiotic course      3  Status post screw fixation right foot on 03/04      4  Status post failed right foot reconstruction, requiring TMA   All hardware prior to TMA were removed   However, as in above, patient is status post screw fixation of right foot after TMA      5  DM, somewhat poorly controlled, with elevated hemoglobin A1c  Management per Medicine Service      Discussed with patient in detail regarding the above plan    Discussed with Dr Oz Cordova from Plastic surgery service      Antibiotics:  Bactrim  POD # 14      Subjective:  Patient is comfortable  Confusion mild and stable  No pain in right foot  Temperature stays down   No chills    He is tolerating antibiotic well   No nausea, vomiting or diarrhea      Objective:  Vitals:  Temp:  [97 3 °F (36 3 °C)-97 7 °F (36 5 °C)] 97 7 °F (36 5 °C)  HR:  [69-75] 75  Resp:  [16-18] 18  BP: (105-142)/(55-82) 142/82  SpO2:  [100 %] 100 %  Temp (24hrs), Av 5 °F (36 4 °C), Min:97 3 °F (36 3 °C), Max:97 7 °F (36 5 °C)  Current: Temperature: 97 7 °F (36 5 °C)    Physical Exam:     General: Awake, alert, cooperative, no distress  Neck:  Supple  No mass  No lymphadenopathy  Lungs: Expansion symmetric, no rales, no wheezing, respirations unlabored  Heart:  Regular rate and rhythm, S1 and S2 normal, no murmur  Abdomen: Soft, nondistended, non-tender, bowel sounds active all four quadrants, no masses, no organomegaly  Extremities: Stable right ankle edema  Right foot wound with worse dehiscence  Mild to moderate serous drainage  No erythema/warmth  Nontender  Skin:  No rash  Neuro: Moves all extremities  Invasive Devices     Peripheral Intravenous Line            Peripheral IV 21 Left Forearm 6 days                Labs studies:   I have personally reviewed pertinent labs  Invalid input(s): ALBUMIN      Results from last 7 days   Lab Units 21  0905   GRAM STAIN RESULT  2+ Polys*  2+ Gram negative rods*   WOUND CULTURE  2+ Growth of Enterobacter cloacae complex*  2+ Growth of        Imaging Studies:   I have personally reviewed pertinent imaging study reports and images in PACS  EKG, Pathology, and Other Studies:   I have personally reviewed pertinent reports   used

## 2021-10-03 NOTE — ED PROVIDER NOTE - PATIENT PORTAL LINK FT
You can access the FollowMyHealth Patient Portal offered by [facility] by registering at the following website: [email or web address]. By joining [employer]’s FollowMyHealth portal, you will also be able to view your health information using other applications (apps) compatible with our system.

## 2021-10-03 NOTE — ED PROVIDER NOTE - ATTENDING CONTRIBUTION TO CARE
HPI: 40yo F with PMH recently diagnosed lymphoma (not currently on chemo) presenting with gradual onset HA x 1 day, located in forehead and occipital region, dull, throbbing. No improvement with tylenol. Denies numbness, tingling, fevers, chills, chest pain, shortness of breath, weakness. No visual changes.     PE:  Gen: NAD  Head: NCAT  HEENT: PERRL, EOMI, Oral mucosa moist, normal conjunctiva  CV: RRR no murmurs, normal perfusion  Resp: CTA b/l, no wheezing, rales, rhonchi, no respiratory distress  GI: Abd Soft NTND  Neuro: No focal neuro deficits, strength 5/5 in all 4 extremities sensation intact throughout, no nuchal rigidity  MSK: FROM all 4 extremities, no deformity  Skin: No rash, no bruising  Psych: Normal affect    MDM: Pt with dull headache- afebrile without nuchal rigidity, labs WNL, low concern for hyperviscosity syndrome, CT neg, patient feeling better. Will shashi Shaw DO     I performed a history and physical exam of the patient and discussed their management with the advanced care provider. I reviewed the advanced care provider's note and agree with the documented findings and plan of care. My medical decision making and objective findings are found above.

## 2021-10-03 NOTE — ED ADULT TRIAGE NOTE - AS TEMP SITE
[facility]  Therapy Progress Note    Patient Name: Adelia Durant  Patient MRN: [id number]  Today's Date: 5/10/2017     Type of session:Individual psychotherapy  Length of session: 50  Persons in attendance:Patient    Subjective/New Info: Ramakrishna reported that she has been feeling significantly better with the improved weather and strategies she has been using.  Discussed terminating sessions at this time-Ramakrishna can return PRN.    Objective/Observations:   Participation: Active verbal participation   Grooming: Casual   Cognition: Alert   Eye contact: Good   Mood: Anxious   Affect: Flexible   Thought process: Logical   Speech: Rate within normal limits   Other:     Diagnoses:   1. SAL (generalized anxiety disorder)         Current risk:   SUICIDE: Not applicable   Homicide: Not applicable   Self-harm: Not applicable   Relapse: Not applicable   Other:    Safety Plan reviewed? Not Indicated   If evidence of imminent risk is present, intervention/plan:     Therapeutic Intervention(s): Positive behavior reinforced and recapped tx and progress made    Treatment Goal(s)/Objective(s) addressed: maintaining progress made     Progress toward Treatment Goals: Significant improvement    Plan:  - Termination of sessions    Leana Oro  5/10/2017                                   
oral

## 2021-10-03 NOTE — ED PROVIDER NOTE - PHYSICAL EXAMINATION
Const: Awake, alert and oriented. In no acute distress. Well appearing.  HEENT: NC/AT. Moist mucous membranes.  Eyes: No scleral icterus. EOMI.  Neck:. Soft and supple. Full ROM without pain. No meningeal signs   Cardiac:  +S1/S2. No murmurs. Peripheral pulses 2+ and symmetric. No LE edema.  Resp: Speaking in full sentences. No evidence of respiratory distress. No wheezes, rales or rhonchi.  Abd: Soft, non-tender, non-distended. Normal bowel sounds in all 4 quadrants. No guarding or rebound.  Back: Spine midline and non-tender. No CVAT.  Skin: No rashes, abrasions or lacerations.  Lymph: No cervical lymphadenopathy.  Neuro: Awake, alert & oriented x 3. CN II-XII intact finger to nose intact neurovasculary intact muscle strength fair gait without ataxia

## 2021-10-04 VITALS
DIASTOLIC BLOOD PRESSURE: 89 MMHG | HEART RATE: 71 BPM | SYSTOLIC BLOOD PRESSURE: 145 MMHG | TEMPERATURE: 99 F | OXYGEN SATURATION: 100 % | RESPIRATION RATE: 19 BRPM

## 2021-10-04 PROBLEM — C85.90 NON-HODGKIN LYMPHOMA, UNSPECIFIED, UNSPECIFIED SITE: Chronic | Status: ACTIVE | Noted: 2021-09-28

## 2021-10-04 PROCEDURE — 70450 CT HEAD/BRAIN W/O DYE: CPT | Mod: 26,MB

## 2021-10-04 NOTE — ED ADULT NURSE NOTE - OBJECTIVE STATEMENT
Patient is alert and oriented X4 from home. Patient c/o headache generalized headache that began today.  Pt. took additional half dose of losartan at 1900 and tylenol at 1900 without relief of pain.  Pt. denies nausea, vomiting, or vision changes.  Pt. endorses lightheadedness.

## 2021-10-05 ENCOUNTER — APPOINTMENT (OUTPATIENT)
Dept: NUCLEAR MEDICINE | Facility: CLINIC | Age: 41
End: 2021-10-05
Payer: COMMERCIAL

## 2021-10-05 ENCOUNTER — OUTPATIENT (OUTPATIENT)
Dept: OUTPATIENT SERVICES | Facility: HOSPITAL | Age: 41
LOS: 1 days | End: 2021-10-05

## 2021-10-05 DIAGNOSIS — Z98.890 OTHER SPECIFIED POSTPROCEDURAL STATES: Chronic | ICD-10-CM

## 2021-10-05 DIAGNOSIS — C82.90 FOLLICULAR LYMPHOMA, UNSPECIFIED, UNSPECIFIED SITE: ICD-10-CM

## 2021-10-05 PROCEDURE — 78815 PET IMAGE W/CT SKULL-THIGH: CPT | Mod: 26,PI

## 2021-10-06 RX ORDER — ETONOGESTREL AND ETHINYL ESTRADIOL 11.7; 2.7 MG/1; MG/1
0.12-0.015 INSERT, EXTENDED RELEASE VAGINAL
Qty: 1 | Refills: 3 | Status: DISCONTINUED | COMMUNITY
Start: 2020-02-05 | End: 2021-10-06

## 2021-10-07 ENCOUNTER — APPOINTMENT (OUTPATIENT)
Dept: HEMATOLOGY ONCOLOGY | Facility: CLINIC | Age: 41
End: 2021-10-07
Payer: COMMERCIAL

## 2021-10-07 VITALS
HEART RATE: 78 BPM | OXYGEN SATURATION: 100 % | SYSTOLIC BLOOD PRESSURE: 139 MMHG | HEIGHT: 60 IN | WEIGHT: 139 LBS | BODY MASS INDEX: 27.29 KG/M2 | DIASTOLIC BLOOD PRESSURE: 91 MMHG

## 2021-10-07 PROCEDURE — 99214 OFFICE O/P EST MOD 30 MIN: CPT

## 2021-10-08 NOTE — CONSULT LETTER
[Dear  ___] : Dear  [unfilled], [Consult Letter:] : I had the pleasure of evaluating your patient, [unfilled]. [Please see my note below.] : Please see my note below. [Consult Closing:] : Thank you very much for allowing me to participate in the care of this patient.  If you have any questions, please do not hesitate to contact me. [Sincerely,] : Sincerely, [FreeTextEntry3] : Allan Joel MD\par Medical Oncology/Hematology\par [facility] [place], [facility]\par \par \par [facility] [employer] [facility]\par

## 2021-10-08 NOTE — ASSESSMENT
[FreeTextEntry1] : 41 year old female stage III follicular lymphoma, grade 1-2, s/p core needle biopsy on 9/3/21. FLIPI sore is 1 - low risk. \par \par We reviewed her 10/5/21 PET CT which showed FDG avid lymph nodes above and below the diaphragm. \par LDH is normal. \par \par Again discussed the natural history of the disease, as well as management options.  Discussed variable course, often waxing and waning in nature.   Discussed treatment indications include B symptoms, symptomatic / bulky liz disease, compromise in organ function, cytopenias. \par \par CBC 09/27/21 is normal: WBC 8.2, Hgb 13.3, HCT 38.5%, platelets 275K. \par \par \par Plan:\par Next CT scans in 6 months\par Follow up in 3 months

## 2021-10-08 NOTE — ADDENDUM
[FreeTextEntry1] : Documented by Reyes Rivera acting as scribe for Dr. Joel on 10/07/2021. \par \par All Medical record entries made by the Scribe were at my, Dr. Joel's, direction and personally dictated by me on 10/07/2021. I have reviewed the chart and agree that the record accurately reflects my personal performance of the history, physical exam, assessment and plan. I have also personally directed, reviewed, and agreed with the discharge instructions.

## 2021-10-08 NOTE — HISTORY OF PRESENT ILLNESS
[de-identified] : Ms. Nava is a 41 year old female was diagnosed with follicular lymphoma, grade 1-2 in September 2021.\par \par Patient palpated a lump on left side of neck, was evaluated by an ENT. \par  CT Soft Tissue Neck on 5/22/21 which demonstrated a small cluster of borderline enlarged lymph nodes up to 1.1 cm left level 5B and left supraclavicular lymph nodes. Underwent fine needle aspiration of left neck supraclavicular lymph node on 6/14/21. Evaluated by surgeon Dr. Rivera on 7/20/21. On 9/3/21 underwent left cervical lymph node core biopsy that demonstrated follicular lymphoma, grade 1-2.\par \par 06/14/2021 Fine needle aspiration, Supraclavicular neck, left level V - small lymphoid population, cannot r/o lymphoproliferative disorder. \par \par \par 09/03/2021 Underwent Lymph node, cervical, left, core biopsy\par - Follicular lymphoma, grade 1-2\par See note and description.\par \par Diagnostic note:  The morphologic and immunophenotypic findings are consistent with follicular lymphoma, grade 1 to 2 of 3.\par However the findings in this core biopsy may not be representative of the entire lesion. Suggest correlation with clinical findings and other ancillary studies.\par Microscopic description: Histologic sections of core biopsy show architecture effacement by proliferation of lymphoid\par follicles/nodules with thin mantle zone. Most of the follicles are back-to-back. The follicular architecture predominates. The follicles consist predominantly of centrocytes with few large cells.\par \par Immunohistochemical stains for CD3, CD5, CD20, PAX5, CD10, BCL6,\par BCL2, MUM1, Ki67, CyclinD1, CD21, CD23 stains performed on\par formalin fixed paraffin embedded tissue, block 1B.\par \par Neoplastic cells are:  _\par Positive:  CD20, PAX-5, CD10 (subset), BCL-6, BCL-2\par Negative:  Cyclin-D1, MUM-1\par Other:  Ki-67 is less than 5% of neoplastic cells. CD21 and CD23\par highlight the loose meshwork of follicular dendritic cells. CD3\par and CD5 highlight T-cells.\par Flow cytometry analysis:  Monotypic B-cells (22% of cells),\par positive for kappa, CD19, CD20, CD10; negative CD5.\par \par Denies weight loss in the last 6 months \par No fevers\par No infections\par No night sweats \par No pain. 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 \par PCP: Dr. Doe Lyon ([place], [place])\par Completed COVID-19 Vaccine series  [de-identified] : Patient returns for a follow-up. \par Seen at ED [facility] 09/28/21 for SOB and went to the ED on 10/04/21 for headaches, performed CT head which was Negative \par Patient is feeling "so-so". She is nervous and anxious \par Denies weight loss, headaches, night sweats \par Patient and  planning to conceive. (Currently, 2 kids - 13 y/o & 8 y/o)\par Completed COVID19 vaccine series.\par \par PET/CT Skul/Thigh 10/05/21: IMPRESSION:\par 1.  Mildly enlarged and mildly FDG avid lymph nodes above and below the diaphragm (most FDG avid the small bowel mesentery with SUV 4.0), probably follicular lymphoma.\par 2.  No scan evidence of FDG avid extranodal disease.

## 2021-10-08 NOTE — REASON FOR VISIT
[Lymphoma] : lymphoma [Follow-Up Visit] : a follow-up visit for [FreeTextEntry2] : follicular lymphoma

## 2021-10-08 NOTE — PHYSICAL EXAM
[Normal] : affect appropriate [de-identified] : 1 cm L cervical lymphadenopathy  [de-identified] : clear [de-identified] : S1/S2 [de-identified] : no edema

## 2021-10-09 RX ORDER — ETONOGESTREL AND ETHINYL ESTRADIOL 11.7; 2.7 MG/1; MG/1
0.12-0.015 INSERT, EXTENDED RELEASE VAGINAL
Qty: 3 | Refills: 0 | Status: ACTIVE | COMMUNITY
Start: 2021-05-11 | End: 1900-01-01

## 2021-10-26 ENCOUNTER — APPOINTMENT (OUTPATIENT)
Dept: OBGYN | Facility: CLINIC | Age: 41
End: 2021-10-26
Payer: COMMERCIAL

## 2021-10-26 VITALS
BODY MASS INDEX: 28.16 KG/M2 | SYSTOLIC BLOOD PRESSURE: 127 MMHG | DIASTOLIC BLOOD PRESSURE: 92 MMHG | WEIGHT: 143.44 LBS | HEIGHT: 60 IN

## 2021-10-26 PROCEDURE — 99212 OFFICE O/P EST SF 10 MIN: CPT

## 2021-10-26 NOTE — COUNSELING
[Nutrition/ Exercise/ Weight Management] : nutrition, exercise, weight management [Body Image] : body image [Vitamins/Supplements] : vitamins/supplements [Sunscreen] : sunscreen [Drugs/Alcohol] : drugs, alcohol [Breast Self Exam] : breast self exam [Bladder Hygiene] : bladder hygiene [STD (testing, results, tx)] : STD (testing, results, tx) [Vaccines] : vaccines

## 2022-01-04 ENCOUNTER — OUTPATIENT (OUTPATIENT)
Dept: OUTPATIENT SERVICES | Facility: HOSPITAL | Age: 42
LOS: 1 days | Discharge: HOME | End: 2022-01-04

## 2022-01-04 DIAGNOSIS — Z98.890 OTHER SPECIFIED POSTPROCEDURAL STATES: Chronic | ICD-10-CM

## 2022-01-04 DIAGNOSIS — C82.90 FOLLICULAR LYMPHOMA, UNSPECIFIED, UNSPECIFIED SITE: ICD-10-CM

## 2022-01-06 ENCOUNTER — APPOINTMENT (OUTPATIENT)
Dept: HEMATOLOGY ONCOLOGY | Facility: CLINIC | Age: 42
End: 2022-01-06
Payer: COMMERCIAL

## 2022-01-06 ENCOUNTER — APPOINTMENT (OUTPATIENT)
Dept: HEMATOLOGY ONCOLOGY | Facility: CLINIC | Age: 42
End: 2022-01-06

## 2022-01-06 PROCEDURE — ZZZZZ: CPT

## 2022-01-06 NOTE — HISTORY OF PRESENT ILLNESS
[Home] : at home, [unfilled] , at the time of the visit. [Medical Office: ([facility])___] : at the medical office located in  [Verbal consent obtained from patient] : the patient, [unfilled] [de-identified] : Ms. Nava is a 41 year old female was diagnosed with follicular lymphoma, grade 1-2 in September 2021.\par \par Patient palpated a lump on left side of neck, was evaluated by an ENT. \par  CT Soft Tissue Neck on 5/22/21 which demonstrated a small cluster of borderline enlarged lymph nodes up to 1.1 cm left level 5B and left supraclavicular lymph nodes. Underwent fine needle aspiration of left neck supraclavicular lymph node on 6/14/21. Evaluated by surgeon Dr. Rivera on 7/20/21. On 9/3/21 underwent left cervical lymph node core biopsy that demonstrated follicular lymphoma, grade 1-2.\par \par 06/14/2021 Fine needle aspiration, Supraclavicular neck, left level V - small lymphoid population, cannot r/o lymphoproliferative disorder. \par \par \par 09/03/2021 Underwent Lymph node, cervical, left, core biopsy\par - Follicular lymphoma, grade 1-2\par See note and description.\par \par Diagnostic note:  The morphologic and immunophenotypic findings are consistent with follicular lymphoma, grade 1 to 2 of 3.\par However the findings in this core biopsy may not be representative of the entire lesion. Suggest correlation with clinical findings and other ancillary studies.\par Microscopic description: Histologic sections of core biopsy show architecture effacement by proliferation of lymphoid\par follicles/nodules with thin mantle zone. Most of the follicles are back-to-back. The follicular architecture predominates. The follicles consist predominantly of centrocytes with few large cells.\par \par Immunohistochemical stains for CD3, CD5, CD20, PAX5, CD10, BCL6,\par BCL2, MUM1, Ki67, CyclinD1, CD21, CD23 stains performed on\par formalin fixed paraffin embedded tissue, block 1B.\par \par Neoplastic cells are:  _\par Positive:  CD20, PAX-5, CD10 (subset), BCL-6, BCL-2\par Negative:  Cyclin-D1, MUM-1\par Other:  Ki-67 is less than 5% of neoplastic cells. CD21 and CD23\par highlight the loose meshwork of follicular dendritic cells. CD3\par and CD5 highlight T-cells.\par Flow cytometry analysis:  Monotypic B-cells (22% of cells),\par positive for kappa, CD19, CD20, CD10; negative CD5.\par \par Denies weight loss in the last 6 months \par No fevers\par No infections\par No night sweats \par No pain. 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 \par PCP: Dr. Doe Lyon ([place], [place])\par Completed COVID-19 Vaccine series  [de-identified] : Verbal consent given by patient to conduct this telehealth visit via telephone.\par Recently seen at [facility] ED for elevated BP. Tested negative for COVID19 while at the hospital. HTN meds were changed. \par Patient is feeling "so-so". \par Denies fevers / night sweats \par Admits weight loss. Notes a couple of lbs. \par Patient planning to conceive. (Currently, 2 kids - 11 y/o & 10 y/o)\par Completed COVID19 vaccine series, 2nd injection on 04/06/2021. Has not received booster.\par \par PET/CT Skul/Thigh 10/05/21: IMPRESSION:\par 1.  Mildly enlarged and mildly FDG avid lymph nodes above and below the diaphragm (most FDG avid the small bowel mesentery with SUV 4.0), probably follicular lymphoma.\par 2.  No scan evidence of FDG avid extranodal disease.

## 2022-01-06 NOTE — REASON FOR VISIT
[Follow-Up Visit] : a follow-up visit for [Lymphoma] : lymphoma [FreeTextEntry2] : follicular lymphoma

## 2022-01-06 NOTE — CONSULT LETTER
[Dear  ___] : Dear  [unfilled], [Consult Letter:] : I had the pleasure of evaluating your patient, [unfilled]. [Please see my note below.] : Please see my note below. [Consult Closing:] : Thank you very much for allowing me to participate in the care of this patient.  If you have any questions, please do not hesitate to contact me. [Sincerely,] : Sincerely, [FreeTextEntry3] : Allan Joel MD\par Medical Oncology/Hematology\par [facility] [place], [facility]\par \par \par [facility] [employer] at [facility]\par

## 2022-01-06 NOTE — ASSESSMENT
[FreeTextEntry1] : 41 year old female stage III follicular lymphoma, grade 1-2, s/p core needle biopsy on 9/3/21. FLIPI sore is 1 - low risk. 10/5/21 PET CT showed FDG avid lymph nodes above and below the diaphragm. \par LDH is normal. \par \par Again discussed the natural history of the disease, as well as management options.  Discussed variable course, often waxing and waning in nature.  Discussed treatment indications include B symptoms, symptomatic / bulky liz disease, compromise in organ function, cytopenias. \par \par CBC 09/27/21 is normal: WBC 8.2, Hgb 13.3, HCT 38.5%, platelets 275K. \par \par No new symptoms. \par \par Plan:\par Next CT scans in 04/2022\par Follow up in 3 months after scans \par She is motivated to become pregnant, will discuss at next visit. \par Time on phone: 5 mins

## 2022-01-13 ENCOUNTER — EMERGENCY (EMERGENCY)
Facility: HOSPITAL | Age: 42
LOS: 1 days | Discharge: HOME | End: 2022-01-13
Attending: EMERGENCY MEDICINE
Payer: COMMERCIAL

## 2022-01-13 VITALS
HEIGHT: 64 IN | RESPIRATION RATE: 18 BRPM | OXYGEN SATURATION: 99 % | DIASTOLIC BLOOD PRESSURE: 97 MMHG | SYSTOLIC BLOOD PRESSURE: 143 MMHG | HEART RATE: 92 BPM | TEMPERATURE: 98 F

## 2022-01-13 DIAGNOSIS — Z98.890 OTHER SPECIFIED POSTPROCEDURAL STATES: Chronic | ICD-10-CM

## 2022-01-13 PROCEDURE — 71045 X-RAY EXAM CHEST 1 VIEW: CPT | Mod: 26

## 2022-01-13 PROCEDURE — 99283 EMERGENCY DEPT VISIT LOW MDM: CPT | Mod: 25

## 2022-01-13 PROCEDURE — 0225U NFCT DS DNA&RNA 21 SARSCOV2: CPT

## 2022-01-13 PROCEDURE — 71045 X-RAY EXAM CHEST 1 VIEW: CPT

## 2022-01-13 PROCEDURE — 99284 EMERGENCY DEPT VISIT MOD MDM: CPT

## 2022-01-13 NOTE — ED PROVIDER NOTE - ATTENDING CONTRIBUTION TO CARE
The patient seen and examined.    Viral URI    I, Juan Luis Hamilton, performed the initial face to face bedside interview with this patient regarding history of present illness, review of symptoms and relevant past medical, social and family history.  I completed an independent physical examination.  I was the initial provider who evaluated this patient. I have signed out the follow up of any pending tests (i.e. labs, radiological studies) to the ACP.  I have communicated the patient’s plan of care and disposition with the ACP.

## 2022-01-13 NOTE — ED PROVIDER NOTE - CLINICAL SUMMARY MEDICAL DECISION MAKING FREE TEXT BOX
Pt well appearing, in NAD, non-toxic appearing. Not hypoxic. No tachypnea, lungs clear on exam. I had lengthy discussion with patient  regarding their symptoms, provided re-assurance and educated pt on strict return precautions. Pt educated on proper supportive care, infection control precautions, which patient populations to avoid and importance of self-quarantining at home. Stable for discharge home. Continue on steroids as prescribed

## 2022-01-13 NOTE — ED ADULT TRIAGE NOTE - CHIEF COMPLAINT QUOTE
Presents to ED c/o feeling sick for about a week. States that she feels congested and like she has a head cold and is having some trouble hearing with the congestion. Also states that she feels like her asthma is exacerbated by the congestion. Took her nebulizer at home with no relief.

## 2022-01-13 NOTE — ED ADULT NURSE NOTE - OBJECTIVE STATEMENT
presents for congestion x 1 week. was seen at [facility] last week for same. states she feels no relief of asthma symptoms. sats 99% on room air. resps unlabored. afebrile. awaiting PA to evalaute

## 2022-01-13 NOTE — ED PROVIDER NOTE - PATIENT PORTAL LINK FT
You can access the FollowMyHealth Patient Portal offered by [facility] by registering at the following website: [email or web address]. By joining [employer]’s FollowMyHealth portal, you will also be able to view your health information using other applications (apps) compatible with our system.

## 2022-01-13 NOTE — ED PROVIDER NOTE - OBJECTIVE STATEMENT
40 y/o female with hx of follicular lymphoma, asthma presents to the ED c/o congestion and episodes of shortness of breath that has been occurring for the past few days. Notes she has been taking her asthma and symbicort but still have her symptoms. Non productive cough, no sick contacts that she can appreciate. Saw her pcp today who started her on steroids. Notes has not taken the steroids as of yet. Denies chest pain, fevers, chills, nausea, vomiting.

## 2022-01-14 LAB
RAPID RVP RESULT: SIGNIFICANT CHANGE UP
SARS-COV-2 RNA SPEC QL NAA+PROBE: SIGNIFICANT CHANGE UP

## 2022-01-19 ENCOUNTER — EMERGENCY (EMERGENCY)
Facility: HOSPITAL | Age: 42
LOS: 1 days | Discharge: HOME | End: 2022-01-19
Attending: EMERGENCY MEDICINE
Payer: COMMERCIAL

## 2022-01-19 VITALS
DIASTOLIC BLOOD PRESSURE: 88 MMHG | SYSTOLIC BLOOD PRESSURE: 131 MMHG | TEMPERATURE: 98 F | HEART RATE: 90 BPM | HEIGHT: 64 IN | OXYGEN SATURATION: 98 % | WEIGHT: 134.92 LBS | RESPIRATION RATE: 19 BRPM

## 2022-01-19 DIAGNOSIS — Z98.890 OTHER SPECIFIED POSTPROCEDURAL STATES: Chronic | ICD-10-CM

## 2022-01-19 PROCEDURE — 99283 EMERGENCY DEPT VISIT LOW MDM: CPT

## 2022-01-19 RX ORDER — ACETAMINOPHEN AND CHLORPHENIRAMINE MALEATE 325; 2 MG/1; MG/1
2 TABLET ORAL
Qty: 84 | Refills: 0
Start: 2022-01-19 | End: 2022-01-25

## 2022-01-19 NOTE — ED PROVIDER NOTE - OBJECTIVE STATEMENT
42 y/o female with hx of follicular lymphoma and asthma. Patient reports left ear pain and nasal congestion x 3 weeks. Patient was here 1/13 for same symptoms, CXR and RVP were normal. Patient just finished a course of medrol and levofloxacin spray.

## 2022-01-19 NOTE — ED PROVIDER NOTE - IV ALTEPLASE DOOR HIDDEN
Problem: Fluid Volume:  Goal: Will maintain balanced intake and output  Patient has had adequate intake and output this shift. Patient tolerating feeds without difficulty.      Problem: Respiratory:  Goal: Respiratory status will improve  Patient remains on 60cc O2 via nasal cannula.  Oxygen saturations maintaining in low to mid 90's while patient sleeping.  Respiratory treatments being given per MAR.           show

## 2022-01-19 NOTE — ED PROVIDER NOTE - TEMPLATE, MLM
At this time- patient is asymptomatic according to son. Encouraged them to call to reschedule PFT. If PFT is unable to be scheduled prior to 2/23 appointment- they will call to reschedule. They verbalized understanding.  
Patient's son called. He wants to know the results of the covid test. Please call him back.  
Routed encounter to Dr Torres's nurse pool in case they have special instructions for patient who is covid positive. Covid test was ordered by Brian. Patient was not notified of result by PCP.   
General

## 2022-01-19 NOTE — ED PROVIDER NOTE - CLINICAL SUMMARY MEDICAL DECISION MAKING FREE TEXT BOX
patient with continued nasal congestion. Patient has been on levofloxacin spray and medrol pack.  Since infection which cased nasal congestion. Will prescribe coricidin cold and flu for symptomatic relief and f/u with ENT.

## 2022-01-19 NOTE — ED ADULT TRIAGE NOTE - CHIEF COMPLAINT QUOTE
bilateral ear pain, worsening cough/SOB x 2 weeks; seen in ED 2 days ago for same. recent dx follicular lymphoma.

## 2022-01-19 NOTE — ED PROVIDER NOTE - NSFOLLOWUPINSTRUCTIONS_ED_ALL_ED_FT
Coricidin HBP Cold and Flu preparation - take 2 pills every 4-6 hours to help with your congestion and pain. Do not take more than 12 pills in a 24 hour period.  Make an appointment with the Ears/Nose/Throat specialist, Dr Neves.

## 2022-01-19 NOTE — ED PROVIDER NOTE - ENMT, MLM
Airway patent, Nasal mucosa clear. Mouth with normal mucosa. nares are swollen and erythemas. tenderness to palpation of frontal  and maxillary sinus

## 2022-01-19 NOTE — ED PROVIDER NOTE - CARE PROVIDER_API CALL
Jose Neves)  Otolaryngology  [address] [place] [address]  Phone: [phone]  Fax: [phone]  Follow Up Time:

## 2022-01-19 NOTE — ED PROVIDER NOTE - PATIENT PORTAL LINK FT
You can access the FollowMyHealth Patient Portal offered by [facility] by registering at the following website: [email or web address]. By joining [employer]’s FollowMyHealth portal, you will also be able to view your health information using other applications (apps) compatible with our system.

## 2022-03-14 ENCOUNTER — EMERGENCY (EMERGENCY)
Facility: HOSPITAL | Age: 42
LOS: 1 days | Discharge: HOME | End: 2022-03-14
Attending: EMERGENCY MEDICINE
Payer: COMMERCIAL

## 2022-03-14 VITALS
RESPIRATION RATE: 18 BRPM | WEIGHT: 145.06 LBS | TEMPERATURE: 98 F | HEIGHT: 64 IN | HEART RATE: 68 BPM | DIASTOLIC BLOOD PRESSURE: 92 MMHG | OXYGEN SATURATION: 98 % | SYSTOLIC BLOOD PRESSURE: 144 MMHG

## 2022-03-14 DIAGNOSIS — Z98.890 OTHER SPECIFIED POSTPROCEDURAL STATES: Chronic | ICD-10-CM

## 2022-03-14 PROCEDURE — 71250 CT THORAX DX C-: CPT | Mod: 26,MA

## 2022-03-14 PROCEDURE — 99284 EMERGENCY DEPT VISIT MOD MDM: CPT

## 2022-03-14 PROCEDURE — 96372 THER/PROPH/DIAG INJ SC/IM: CPT

## 2022-03-14 PROCEDURE — 99284 EMERGENCY DEPT VISIT MOD MDM: CPT | Mod: 25

## 2022-03-14 PROCEDURE — 71250 CT THORAX DX C-: CPT | Mod: MA

## 2022-03-14 RX ORDER — IBUPROFEN 200 MG
1 TABLET ORAL
Qty: 20 | Refills: 0
Start: 2022-03-14 | End: 2022-03-18

## 2022-03-14 RX ORDER — METHOCARBAMOL 500 MG/1
1500 TABLET, FILM COATED ORAL ONCE
Refills: 0 | Status: COMPLETED | OUTPATIENT
Start: 2022-03-14 | End: 2022-03-14

## 2022-03-14 RX ORDER — METHOCARBAMOL 500 MG/1
1 TABLET, FILM COATED ORAL
Qty: 14 | Refills: 0
Start: 2022-03-14 | End: 2022-03-20

## 2022-03-14 RX ORDER — KETOROLAC TROMETHAMINE 30 MG/ML
15 SYRINGE (ML) INJECTION ONCE
Refills: 0 | Status: DISCONTINUED | OUTPATIENT
Start: 2022-03-14 | End: 2022-03-14

## 2022-03-14 RX ADMIN — METHOCARBAMOL 1500 MILLIGRAM(S): 500 TABLET, FILM COATED ORAL at 18:41

## 2022-03-14 RX ADMIN — Medication 15 MILLIGRAM(S): at 18:41

## 2022-03-14 NOTE — ED PROVIDER NOTE - NSFOLLOWUPINSTRUCTIONS_ED_ALL_ED_FT
- Prescription sent to pharmacy.  - Ibuprofen 600mg every 6 hours as needed for pain.  - Acetaminophen 650mg every 6 hours as needed for pain.   - Over the counter Salon Pas.   - Do not lift >10lbs.   - Please bring all documentation from your ED visit to any related future follow up appointment.  - Please call to schedule follow up appointment with your primary care physician within 24-48 hours.  - Please seek immediate medical attention for any new/worsening, signs/symptoms, or concerns.    Feel better!       Acute Low Back Pain    WHAT YOU NEED TO KNOW:    What is acute low back pain? Acute low back pain is sudden discomfort that lasts up to 6 weeks and makes activity difficult.    What causes or increases my risk for acute low back pain? Conditions that affect the spine, joints, or muscles can cause back pain. These may include arthritis, spinal stenosis (narrowing of the spinal column), muscle tension, or breakdown of the spinal discs. The following increase your risk for back pain:  •Repeated bending, lifting, or twisting, or lifting heavy items      •Injury from a fall or accident      •Lack of regular physical activity      •Obesity or pregnancy      •Smoking      •Aging      •Driving, sitting, or standing for long periods      •Bad posture while sitting or standing      How is the cause of acute low back pain diagnosed? Your healthcare provider will ask about your medical history and examine you. He or she may ask when you last had low back pain and how it started. Show him or her where you feel the pain and what makes it better or worse. Tell your provider about the type of pain you have, how bad it is, and how long it lasts. Tell him or her if your pain worsens at night or when you lie on your back.    Pain Scale          How is acute low back pain treated? The goal of treatment is to relieve your pain and help you be able to do your regular activities. Most people with acute low back pain get better within 4 to 6 weeks. You may need any of the following:  •NSAIDs, such as ibuprofen, help decrease swelling, pain, and fever. This medicine is available with or without a doctor's order. NSAIDs can cause stomach bleeding or kidney problems in certain people. If you take blood thinner medicine, always ask your healthcare provider if NSAIDs are safe for you. Always read the medicine label and follow directions.      •Acetaminophen decreases pain and fever. It is available without a doctor's order. Ask how much to take and how often to take it. Follow directions. Read the labels of all other medicines you are using to see if they also contain acetaminophen, or ask your doctor or pharmacist. Acetaminophen can cause liver damage if not taken correctly. Do not use more than 4 grams (4,000 milligrams) total of acetaminophen in one day.       •Muscle relaxers decrease pain by relaxing the muscles in your lower spine.      •Prescription pain medicine may be given. Ask your healthcare provider how to take this medicine safely. Some prescription pain medicines contain acetaminophen. Do not take other medicines that contain acetaminophen without talking to your healthcare provider. Too much acetaminophen may cause liver damage. Prescription pain medicine may cause constipation. Ask your healthcare provider how to prevent or treat constipation.       What can I do to prevent low back pain?   •Use proper body mechanics. ?Bend at the hips and knees when you  objects. Do not bend from the waist. Use your leg muscles as you lift the load. Do not use your back. Keep the object close to your chest as you lift it. Try not to twist or lift anything above your waist.      ?Change your position often when you stand for long periods of time. Rest one foot on a small box or footrest, and then switch to the other foot often.      ?Try not to sit for long periods of time. When you do, sit in a straight-backed chair with your feet flat on the floor. Never reach, pull, or push while you are sitting.      •Do exercises that strengthen your back muscles. Warm up before you exercise. Ask your healthcare provider for the best exercises for you.      •Maintain a healthy weight. Ask your healthcare provider what a healthy weight is for you. Ask him or her to help you create a weight loss plan if you are overweight.      How can I take care of myself if I have acute low back pain?   •Stay active as much as you can without causing more pain. Bed rest could make your back pain worse. Start with some light exercises, such as walking. Avoid heavy lifting until your pain is gone. Ask for more information about the activities or exercises that are right for you.   FAMILY WALKING FOR EXERCISE           •Apply heat on your back for 20 to 30 minutes every 2 hours for as many days as directed. Heat helps decrease pain and muscle spasms. Alternate heat and ice.      •Apply ice on your back for 15 to 20 minutes every hour or as directed. Use an ice pack, or put crushed ice in a plastic bag. Cover it with a towel before you apply it to your skin. Ice helps prevent tissue damage and decreases swelling and pain.      When should I seek immediate care?   •You have severe pain.      •You have sudden stiffness and heaviness down both legs.      •You have numbness or weakness in one leg, or pain in both legs.      •You have numbness in your genital area or across your lower back.      •You cannot control your urine or bowel movements.      When should I call my doctor?   •You have a fever.      •You have pain at night or when you rest.      •Your pain does not get better with treatment.      •You have pain that worsens when you cough or sneeze.      •You suddenly feel something pop or snap in your back.      •You have questions or concerns about your condition or care.      CARE AGREEMENT:    You have the right to help plan your care. Learn about your health condition and how it may be treated. Discuss treatment options with your healthcare providers to decide what care you want to receive. You always have the right to refuse treatment.

## 2022-03-14 NOTE — ED PROVIDER NOTE - ATTENDING CONTRIBUTION TO CARE
The patient seen and examined    Low back pain    I, Juan Luis Hamilton, performed the initial face to face bedside interview with this patient regarding history of present illness, review of symptoms and relevant past medical, social and family history.  I completed an independent physical examination.  I was the initial provider who evaluated this patient. I have signed out the follow up of any pending tests (i.e. labs, radiological studies) to the ACP.  I have communicated the patient’s plan of care and disposition with the ACP.

## 2022-03-14 NOTE — ED PROVIDER NOTE - NS ED ATTENDING STATEMENT MOD
This was a shared visit with the ROSHAN. I reviewed and verified the documentation and independently performed the documented:

## 2022-03-14 NOTE — ED PROVIDER NOTE - PATIENT PORTAL LINK FT
You can access the FollowMyHealth Patient Portal offered by [facility] by registering at the following website: [email or web address]. By joining [employer]’s FollowMyHealth portal, you will also be able to view your health information using other applications (apps) compatible with our system.

## 2022-03-14 NOTE — ED PROVIDER NOTE - NSFOLLOWUPCLINICS_GEN_ALL_ED_FT
[facility]  Ortho/Spine  [address] [place] [address]  Phone: [phone]  Fax:     [facility]  Hematology/Oncology  [address] [place] [address]  Phone: [phone]  Fax:

## 2022-03-14 NOTE — ED PROVIDER NOTE - CLINICAL SUMMARY MEDICAL DECISION MAKING FREE TEXT BOX
41 y/o female presents with reproducible right lateral chest wall pain with h/o multiple myeloma   chest CT eval for bony pathology,   PT singed out to [facility] [place] pending CT results

## 2022-03-14 NOTE — ED PROVIDER NOTE - OBJECTIVE STATEMENT
43 y/o female h/o multiple myeloma being followed by Juancarlos. PT being observed at this time by juancarlos with no active treatement. She is c/o atraumatic pain to the right lateral rib area. Denies any sob, palpitations, syncope, fever, chills, or cough.

## 2022-03-26 ENCOUNTER — APPOINTMENT (OUTPATIENT)
Dept: CT IMAGING | Facility: CLINIC | Age: 42
End: 2022-03-26

## 2022-04-09 ENCOUNTER — RESULT REVIEW (OUTPATIENT)
Age: 42
End: 2022-04-09

## 2022-04-09 ENCOUNTER — OUTPATIENT (OUTPATIENT)
Dept: OUTPATIENT SERVICES | Facility: HOSPITAL | Age: 42
LOS: 1 days | End: 2022-04-09

## 2022-04-09 ENCOUNTER — APPOINTMENT (OUTPATIENT)
Dept: CT IMAGING | Facility: CLINIC | Age: 42
End: 2022-04-09
Payer: COMMERCIAL

## 2022-04-09 DIAGNOSIS — Z98.890 OTHER SPECIFIED POSTPROCEDURAL STATES: Chronic | ICD-10-CM

## 2022-04-09 DIAGNOSIS — Z00.00 ENCOUNTER FOR GENERAL ADULT MEDICAL EXAMINATION WITHOUT ABNORMAL FINDINGS: ICD-10-CM

## 2022-04-09 PROCEDURE — 70491 CT SOFT TISSUE NECK W/DYE: CPT | Mod: 26

## 2022-04-09 PROCEDURE — 74177 CT ABD & PELVIS W/CONTRAST: CPT | Mod: 26

## 2022-04-09 PROCEDURE — 71260 CT THORAX DX C+: CPT | Mod: 26

## 2022-04-13 ENCOUNTER — OUTPATIENT (OUTPATIENT)
Dept: OUTPATIENT SERVICES | Facility: HOSPITAL | Age: 42
LOS: 1 days | Discharge: HOME | End: 2022-04-13

## 2022-04-13 DIAGNOSIS — Z98.890 OTHER SPECIFIED POSTPROCEDURAL STATES: Chronic | ICD-10-CM

## 2022-04-13 DIAGNOSIS — C82.90 FOLLICULAR LYMPHOMA, UNSPECIFIED, UNSPECIFIED SITE: ICD-10-CM

## 2022-04-14 ENCOUNTER — RESULT REVIEW (OUTPATIENT)
Age: 42
End: 2022-04-14

## 2022-04-14 ENCOUNTER — APPOINTMENT (OUTPATIENT)
Dept: HEMATOLOGY ONCOLOGY | Facility: CLINIC | Age: 42
End: 2022-04-14
Payer: COMMERCIAL

## 2022-04-14 VITALS
WEIGHT: 145 LBS | DIASTOLIC BLOOD PRESSURE: 85 MMHG | HEART RATE: 65 BPM | HEIGHT: 60 IN | SYSTOLIC BLOOD PRESSURE: 126 MMHG | OXYGEN SATURATION: 100 % | BODY MASS INDEX: 28.47 KG/M2

## 2022-04-14 LAB
BASOPHILS # BLD AUTO: 0.1 K/UL — SIGNIFICANT CHANGE UP (ref 0–0.2)
BASOPHILS NFR BLD AUTO: 1.3 % — SIGNIFICANT CHANGE UP (ref 0–2)
EOSINOPHIL # BLD AUTO: 0.3 K/UL — SIGNIFICANT CHANGE UP (ref 0–0.5)
EOSINOPHIL NFR BLD AUTO: 4.2 % — SIGNIFICANT CHANGE UP (ref 0–6)
HCT VFR BLD CALC: 41 % — SIGNIFICANT CHANGE UP (ref 34.5–45)
HGB BLD-MCNC: 13.5 G/DL — SIGNIFICANT CHANGE UP (ref 11.5–15.5)
LYMPHOCYTES # BLD AUTO: 3.3 K/UL — SIGNIFICANT CHANGE UP (ref 1–3.3)
LYMPHOCYTES # BLD AUTO: 51.2 % — HIGH (ref 13–44)
MCHC RBC-ENTMCNC: 31.5 PG — SIGNIFICANT CHANGE UP (ref 27–34)
MCHC RBC-ENTMCNC: 33 G/DL — SIGNIFICANT CHANGE UP (ref 32–36)
MCV RBC AUTO: 95.7 FL — SIGNIFICANT CHANGE UP (ref 80–100)
MONOCYTES # BLD AUTO: 0.3 K/UL — SIGNIFICANT CHANGE UP (ref 0–0.9)
MONOCYTES NFR BLD AUTO: 4.5 % — SIGNIFICANT CHANGE UP (ref 2–14)
NEUTROPHILS # BLD AUTO: 2.5 K/UL — SIGNIFICANT CHANGE UP (ref 1.8–7.4)
NEUTROPHILS NFR BLD AUTO: 38.8 % — LOW (ref 43–77)
PLATELET # BLD AUTO: 272 K/UL — SIGNIFICANT CHANGE UP (ref 150–400)
RBC # BLD: 4.29 M/UL — SIGNIFICANT CHANGE UP (ref 3.8–5.2)
RBC # FLD: 11.6 % — SIGNIFICANT CHANGE UP (ref 10.3–14.5)
WBC # BLD: 6.4 K/UL — SIGNIFICANT CHANGE UP (ref 3.8–10.5)
WBC # FLD AUTO: 6.4 K/UL — SIGNIFICANT CHANGE UP (ref 3.8–10.5)

## 2022-04-14 PROCEDURE — 99214 OFFICE O/P EST MOD 30 MIN: CPT

## 2022-04-14 NOTE — CONSULT LETTER
[Dear  ___] : Dear  [unfilled], [Consult Letter:] : I had the pleasure of evaluating your patient, [unfilled]. [Please see my note below.] : Please see my note below. [Consult Closing:] : Thank you very much for allowing me to participate in the care of this patient.  If you have any questions, please do not hesitate to contact me. [Sincerely,] : Sincerely, [FreeTextEntry3] : Allan Joel MD\par Medical Oncology/Hematology\par [facility] [place], [facility]\par \par \par [facility]\par

## 2022-04-14 NOTE — HISTORY OF PRESENT ILLNESS
[de-identified] : Ms. Nava is a 42 year old female was diagnosed with follicular lymphoma, grade 1-2 in September 2021.\par \par Patient palpated a lump on left side of neck, was evaluated by an ENT. \par  CT Soft Tissue Neck on 5/22/21 which demonstrated a small cluster of borderline enlarged lymph nodes up to 1.1 cm left level 5B and left supraclavicular lymph nodes. Underwent fine needle aspiration of left neck supraclavicular lymph node on 6/14/21. Evaluated by surgeon Dr. Rivera on 7/20/21. On 9/3/21 underwent left cervical lymph node core biopsy that demonstrated follicular lymphoma, grade 1-2.\par \par 06/14/2021 Fine needle aspiration, Supraclavicular neck, left level V - small lymphoid population, cannot r/o lymphoproliferative disorder. \par \par \par 09/03/2021 Underwent Lymph node, cervical, left, core biopsy\par - Follicular lymphoma, grade 1-2\par See note and description.\par \par Diagnostic note:  The morphologic and immunophenotypic findings are consistent with follicular lymphoma, grade 1 to 2 of 3.\par However the findings in this core biopsy may not be representative of the entire lesion. Suggest correlation with clinical findings and other ancillary studies.\par Microscopic description: Histologic sections of core biopsy show architecture effacement by proliferation of lymphoid\par follicles/nodules with thin mantle zone. Most of the follicles are back-to-back. The follicular architecture predominates. The follicles consist predominantly of centrocytes with few large cells.\par \par Immunohistochemical stains for CD3, CD5, CD20, PAX5, CD10, BCL6,\par BCL2, MUM1, Ki67, CyclinD1, CD21, CD23 stains performed on\par formalin fixed paraffin embedded tissue, block 1B.\par \par Neoplastic cells are:  _\par Positive:  CD20, PAX-5, CD10 (subset), BCL-6, BCL-2\par Negative:  Cyclin-D1, MUM-1\par Other:  Ki-67 is less than 5% of neoplastic cells. CD21 and CD23\par highlight the loose meshwork of follicular dendritic cells. CD3\par and CD5 highlight T-cells.\par Flow cytometry analysis:  Monotypic B-cells (22% of cells),\par positive for kappa, CD19, CD20, CD10; negative CD5.\par \par Denies weight loss in the last 6 months \par No fevers\par No infections\par No night sweats \par No pain. 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 \par PCP: Dr. Doe Lyon ([place], [place])\par Completed COVID-19 Vaccine series  [de-identified] : Patient presents today for follow-up. \par Good energy levels \par Denies fevers / night sweats \par Denies weight loss\par Denies leg swelling\par Staying active by exercising \par Patient planning to conceive within the next year. (Currently, 2 kids (girls) - 11 y/o & 10 y/o)\par Completed COVID19 vaccine series, 2nd injection on 04/06/2021. Has not received booster.\par \par 4/09/22 CT Neck: 1.0 x 1.1 cm LEFT level 4 lymph node is noted. A 1.1 x 0.9 cm LEFT supraclavicular lymph node is noted. A 1.1 x 0.9 cm LEFT axillary lymph node is noted and a 1.3 x 1.1 cm RIGHT axillary lymph node is noted. These are relatively unchanged from prior PET/CT.  Soft tissue is noted in the anterior mediastinum likely rebound thymic tissue slightly increased in size since prior examination.\par \par 4/09/22 CT CAP: Since October 5, 2021, unchanged left supraclavicular, retroperitoneal and mesenteric adenopathy.\par \par PET/CT Skul/Thigh 10/05/21: IMPRESSION:\par 1.  Mildly enlarged and mildly FDG avid lymph nodes above and below the diaphragm (most FDG avid the small bowel mesentery with SUV 4.0), probably follicular lymphoma.\par 2.  No scan evidence of FDG avid extranodal disease.

## 2022-04-14 NOTE — ASSESSMENT
[FreeTextEntry1] : 42 year old female stage III follicular lymphoma, grade 1-2, s/p core needle biopsy on 9/3/21. FLIPI sore is 1 - low risk. 10/5/21 PET CT showed FDG avid lymph nodes above and below the diaphragm. \par LDH is normal. \par \par Again discussed the natural history of the disease, as well as management options.  Discussed variable course, often waxing and waning in nature.  Discussed treatment indications include B symptoms, symptomatic / bulky liz disease, compromise in organ function, cytopenias. \par \par CBC 04/14/22 is normal: WBC 6.2, Hgb 13.5, HCT 41%, platelets 272 K. \par \par No new symptoms. \par Reviewed CT Neck on 4/09/22: Lymph nodes relatively unchanged since prior PET/CT.\par Reviewed CT CAP on 4/09/22: Remains unchanged \par \par Plan:\par Follow up in 3 months \par She is motivated to become pregnant, will discuss after next scans \par \par

## 2022-04-15 LAB
ALBUMIN SERPL ELPH-MCNC: 4.3 G/DL
ALP BLD-CCNC: 44 U/L
ALT SERPL-CCNC: 23 U/L
ANION GAP SERPL CALC-SCNC: 10 MMOL/L
AST SERPL-CCNC: 24 U/L
BILIRUB SERPL-MCNC: 0.3 MG/DL
BUN SERPL-MCNC: 10 MG/DL
CALCIUM SERPL-MCNC: 9.4 MG/DL
CHLORIDE SERPL-SCNC: 106 MMOL/L
CO2 SERPL-SCNC: 24 MMOL/L
CREAT SERPL-MCNC: 0.76 MG/DL
EGFR: 102 ML/MIN/1.73M2
GLUCOSE SERPL-MCNC: 88 MG/DL
LDH SERPL-CCNC: 241 U/L
POTASSIUM SERPL-SCNC: 4.2 MMOL/L
PROT SERPL-MCNC: 6.6 G/DL
SODIUM SERPL-SCNC: 140 MMOL/L

## 2022-06-24 ENCOUNTER — APPOINTMENT (OUTPATIENT)
Dept: OBGYN | Facility: CLINIC | Age: 42
End: 2022-06-24
Payer: COMMERCIAL

## 2022-06-24 VITALS
WEIGHT: 146 LBS | BODY MASS INDEX: 28.66 KG/M2 | DIASTOLIC BLOOD PRESSURE: 82 MMHG | SYSTOLIC BLOOD PRESSURE: 122 MMHG | HEIGHT: 60 IN

## 2022-06-24 DIAGNOSIS — N94.6 DYSMENORRHEA, UNSPECIFIED: ICD-10-CM

## 2022-06-24 PROCEDURE — 99396 PREV VISIT EST AGE 40-64: CPT

## 2022-06-24 RX ORDER — PRENATAL VIT/IRON FUM/FOLIC AC 28MG-0.8MG
28-0.8 TABLET ORAL DAILY
Qty: 90 | Refills: 3 | Status: ACTIVE | COMMUNITY
Start: 2022-06-24 | End: 1900-01-01

## 2022-06-24 NOTE — PHYSICAL EXAM
[Chaperone Present] : A chaperone was present in the examining room during all aspects of the physical examination [FreeTextEntry1] : Rosa [Appropriately responsive] : appropriately responsive [Alert] : alert [No Acute Distress] : no acute distress [No Lymphadenopathy] : no lymphadenopathy [Regular Rate Rhythm] : regular rate rhythm [No Murmurs] : no murmurs [Clear to Auscultation B/L] : clear to auscultation bilaterally [Soft] : soft [Non-tender] : non-tender [Non-distended] : non-distended [No HSM] : No HSM [No Lesions] : no lesions [No Mass] : no mass [Oriented x3] : oriented x3 [Examination Of The Breasts] : a normal appearance [No Masses] : no breast masses were palpable [Labia Majora] : normal [Labia Minora] : normal [Normal] : normal [Uterine Adnexae] : normal

## 2022-06-30 LAB — CYTOLOGY CVX/VAG DOC THIN PREP: NORMAL

## 2022-07-08 ENCOUNTER — OUTPATIENT (OUTPATIENT)
Dept: OUTPATIENT SERVICES | Facility: HOSPITAL | Age: 42
LOS: 1 days | Discharge: HOME | End: 2022-07-08

## 2022-07-08 DIAGNOSIS — C82.90 FOLLICULAR LYMPHOMA, UNSPECIFIED, UNSPECIFIED SITE: ICD-10-CM

## 2022-07-08 DIAGNOSIS — Z98.890 OTHER SPECIFIED POSTPROCEDURAL STATES: Chronic | ICD-10-CM

## 2022-07-08 NOTE — ED ADULT TRIAGE NOTE - PRO INTERPRETER NEED 2
7/8/2022         RE: Lucia Oliver  [address]  [facility] [address]        Dear Colleague,    Thank you for referring your patient, Lucia Oliver, to the [facility] [place]. Please see a copy of my visit note below.    Chief Complaint   Patient presents with     Follow Up     1 year follow up. Diabetic foot.               Allergies   Allergen Reactions     Ibuprofen Nausea and Vomiting     Hard on her stomach/digestion   [facility]acyRecord#[id number]         Subjective: Lucia is a 63 year old female who presents to the clinic today for a diabetic foot exam and management. She relates that she still does have neuropathic symptoms.  I did ask her today if she would like to try a treatment for the neuropathy.  She would try something topical.  She does not want oral medication.  She is ready for new shoes.  She also relates to having pain in her neck on the right side.    Objective  Hemoglobin A1C POCT   Date Value Ref Range Status   09/26/2005 5.6 4.3 - 6.0 % Final     Comment:     Assayed at [place] [address]   04/20/2005 6.3 (H) 4.3 - 6.0 % Final     Comment:     Assayed at [place] [address]     Contains abnormal data HEMOGLOBIN A1C  Order: [id number]  (suggestion)  Information displayed in this report will not trend or trigger automated decision support.    Ref Range & Units 3mo ago   Hemoglobin A1c, POC 0 - 6.4 % 8.6High     Comment: Normal <5.7% Prediabetes 5.7-6.4%  Diabetes 6.5% or higher - adopted from ADA   consensus guidelines.   Assayed at [place] [address]   Resulting Agency  [facility]   Specimen Collected: 11/17/20  4:57 PM Last Resulted: 11/17/20  5:16 PM   Received From: [facility]  Result Received: 02/24/21  9:44 AM         PT and DP pulses are 1/4 bilaterally. CRT is >3 seconds. Diminished pedal hair.   Gross sensation is  intact bilaterally. Protective sensation is intact as demonstrated with a 5.07G monofilament. Paresthesia not reproducible today. No Tinel's sign.   Equinus is noted bilaterally. No pain with active or passive ROM of the ankle, MTJ, 1st ray, or halluces bilaterally. Pes planus BL. Hammertoes BL  Nails thickened, yellow, brittle, with subungual debris bilaterally at the halluces, 2nd, and 5th digits. Only right hallux and 2nd digit are elongated. Remaining nails are dystrophic. No open lesions are noted. Porokeratosis to the right plantar 5th met head has resolved.      Assessment: DM2  PVD  Onychomycosis x 6. Dystrophic nails x 4  Paresthesia - likely diabetic neuropathy  Pes planus with hammertoes.         Plan:  - Pt seen and evaluated.   - Rx for diabetic shoes.   -I sent in a prescription for capsaicin cream.  -Referral to my colleagues in sports medicine for the neck pain.  - Nails were debrided x 8.   - See again in 4 months.      Dimitri Sanchez DPM   [employer]

## 2022-07-21 ENCOUNTER — APPOINTMENT (OUTPATIENT)
Dept: HEMATOLOGY ONCOLOGY | Facility: CLINIC | Age: 42
End: 2022-07-21

## 2022-08-04 RX ORDER — ETONOGESTREL AND ETHINYL ESTRADIOL 11.7; 2.7 MG/1; MG/1
0.12-0.015 INSERT, EXTENDED RELEASE VAGINAL
Qty: 3 | Refills: 3 | Status: ACTIVE | COMMUNITY
Start: 2021-10-26 | End: 1900-01-01

## 2022-09-21 ENCOUNTER — OUTPATIENT (OUTPATIENT)
Dept: OUTPATIENT SERVICES | Facility: HOSPITAL | Age: 42
LOS: 1 days | Discharge: HOME | End: 2022-09-21

## 2022-09-21 DIAGNOSIS — C82.90 FOLLICULAR LYMPHOMA, UNSPECIFIED, UNSPECIFIED SITE: ICD-10-CM

## 2022-09-21 DIAGNOSIS — Z98.890 OTHER SPECIFIED POSTPROCEDURAL STATES: Chronic | ICD-10-CM

## 2022-09-27 ENCOUNTER — APPOINTMENT (OUTPATIENT)
Dept: HEMATOLOGY ONCOLOGY | Facility: CLINIC | Age: 42
End: 2022-09-27

## 2022-10-12 ENCOUNTER — EMERGENCY (EMERGENCY)
Facility: HOSPITAL | Age: 42
LOS: 1 days | Discharge: HOME | End: 2022-10-12
Attending: EMERGENCY MEDICINE
Payer: COMMERCIAL

## 2022-10-12 VITALS
WEIGHT: 145.06 LBS | OXYGEN SATURATION: 99 % | HEIGHT: 64 IN | TEMPERATURE: 98 F | RESPIRATION RATE: 16 BRPM | SYSTOLIC BLOOD PRESSURE: 141 MMHG | DIASTOLIC BLOOD PRESSURE: 89 MMHG | HEART RATE: 83 BPM

## 2022-10-12 DIAGNOSIS — Z98.890 OTHER SPECIFIED POSTPROCEDURAL STATES: Chronic | ICD-10-CM

## 2022-10-12 LAB
RAPID RVP RESULT: DETECTED
SARS-COV-2 RNA SPEC QL NAA+PROBE: DETECTED

## 2022-10-12 PROCEDURE — 0225U NFCT DS DNA&RNA 21 SARSCOV2: CPT

## 2022-10-12 PROCEDURE — 99283 EMERGENCY DEPT VISIT LOW MDM: CPT

## 2022-10-12 PROCEDURE — 99284 EMERGENCY DEPT VISIT MOD MDM: CPT

## 2022-10-12 RX ORDER — AZITHROMYCIN 500 MG/1
1 TABLET, FILM COATED ORAL
Qty: 3 | Refills: 0
Start: 2022-10-12 | End: 2022-10-14

## 2022-10-12 RX ORDER — FAMOTIDINE 10 MG/ML
20 INJECTION INTRAVENOUS DAILY
Refills: 0 | Status: DISCONTINUED | OUTPATIENT
Start: 2022-10-12 | End: 2022-10-17

## 2022-10-12 RX ORDER — IBUPROFEN 200 MG
600 TABLET ORAL ONCE
Refills: 0 | Status: COMPLETED | OUTPATIENT
Start: 2022-10-12 | End: 2022-10-12

## 2022-10-12 RX ORDER — DIPHENHYDRAMINE HCL 50 MG
25 CAPSULE ORAL ONCE
Refills: 0 | Status: COMPLETED | OUTPATIENT
Start: 2022-10-12 | End: 2022-10-12

## 2022-10-12 RX ADMIN — Medication 25 MILLIGRAM(S): at 20:38

## 2022-10-12 RX ADMIN — Medication 600 MILLIGRAM(S): at 20:38

## 2022-10-12 RX ADMIN — FAMOTIDINE 20 MILLIGRAM(S): 10 INJECTION INTRAVENOUS at 20:38

## 2022-10-12 NOTE — ED PROVIDER NOTE - PATIENT PORTAL LINK FT
You can access the FollowMyHealth Patient Portal offered by [facility] by registering at the following website: [email or web address]. By joining [employer] [facility]’s FollowMyHealth portal, you will also be able to view your health information using other applications (apps) compatible with our system.

## 2022-10-12 NOTE — ED PROVIDER NOTE - CARE PROVIDER_API CALL
Hunter Clinton)  Medicine Pediatrics  [address] [place] [address]  Phone: [phone]  Fax: [phone]  Follow Up Time:

## 2022-10-12 NOTE — ED PROVIDER NOTE - NS ED ROS FT
Gen: denies fever, chills  Skin: denies rashes, laceration, bruising  HEENT: +nasal congestion, left ear pain, tingling on tongue (does not feel this right now). . denies visual changes, throat pain  Respiratory: denies JACOBSON, SOB, cough, wheezing  Cardiovascular: denies chest pain, palpitations  GI: denies abdominal pain, n/v/d  : denies dysuria, frequency  MSK: denies joint swelling/pain, back pain, neck pain  Neuro: denies headache, dizziness, LOC, weakness, numbness

## 2022-10-12 NOTE — ED ADULT TRIAGE NOTE - CHIEF COMPLAINT QUOTE
Pt states she has an ear infection, was started on levaquin and now has sores in her mouth and feels like she is having an asthma attack. respirations even and unlabored, speaking in full sentences.

## 2022-10-12 NOTE — ED PROVIDER NOTE - ATTENDING APP SHARED VISIT CONTRIBUTION OF CARE
Pt with dx ear infection has ? reaction to levaquin  pe as doc  agree w change of AB and treatment of symptoms  agree w serene and mngt

## 2022-10-12 NOTE — ED PROVIDER NOTE - OBJECTIVE STATEMENT
41 yo female with pmhx of lymphoma (currently receiving no meds), mild intermittent asthma, HTN presents with nasal congestion, left ear pain x1wk. Pt states that she has been having cold symptoms x1 wk. Went to PCP yesterday bc symptoms were persistent and developed worsening left sided ear pain, denies drainage or d/c from ear. States her PCP prescribed her with levafloxacin for the ear infection bc has PCN allergy (unsure of what allergy is but said she has been told not to take pcn since she was a child). Pt states she took one dose last night and another dose this morning. Denies ever taking this medication before. She reports this morning, had pain on her tongue and noticed some "sores" in the mouth with some tingling. Denies feeling like throat closed, difficulties breathing, or difficulties swallowing. Pt states she has been experiencing this intermittently throughout the day but has some pain over the sore on the tongue. Denies taking anything for the pain. Denies fever, chills, body aches, dizziness, LOC, CP, urinary symptoms, paresthesias in the extremities, rashes, hives. 43 yo female with pmhx of lymphoma (currently receiving no meds), mild intermittent asthma, HTN presents with nasal congestion, left ear pain x1wk. Pt states that she has been having cold symptoms x1 wk. Went to PCP yesterday bc symptoms were persistent and developed worsening left sided ear pain, denies drainage or d/c from ear. States her PCP prescribed her with levafloxacin for the ear infection bc has PCN allergy (unsure of what allergy is but said she has been told not to take pcn since she was a child). Pt states she took one dose last night and another dose this morning. Denies ever taking this medication before. She reports this morning, had pain on her tongue and noticed some "sores" in the mouth with some tingling. Denies feeling like throat closed, difficulties breathing, or difficulties swallowing. Pt states she has been experiencing this intermittently throughout the day but has some pain over the sore on the tongue. Denies taking anything for the pain. Pt states her biggest complaint right now is the nasal congestion (Was already started on nasal spray by pcp), and the tongue pain. Denies fever, chills, body aches, dizziness, LOC, CP, urinary symptoms, paresthesias in the extremities, rashes, hives.

## 2022-10-12 NOTE — ED PROVIDER NOTE - NSFOLLOWUPINSTRUCTIONS_ED_ALL_ED_FT
- Please follow-up with your primary care doctor in the next 1-2 days.  Please call tomorrow for an appointment.  If you cannot follow-up with your primary care doctor please return to the ED for any urgent issues.  - Take antibiotics as prescribed. Take medication with food to prevent upset stomach.   - Follow up with the allergist.   - Take ibuprofen every 6 hours or tylenol every 4 hours as needed for pain. Take medication with food to prevent upset stomach.   - Stay well hydrated.   - You were given a copy of the tests performed today.  Please bring the results with you and review them with your primary care doctor.  - If you have any worsening of symptoms or any other concerns please return to the ED immediately.  - Please continue taking your home medications as directed.     Viral Respiratory Infection    A viral respiratory infection is an illness that affects parts of the body used for breathing, like the lungs, nose, and throat. It is caused by a germ called a virus. Symptoms can include runny nose, coughing, sneezing, fatigue, body aches, sore throat, fever, or headache. Over the counter medicine can be used to manage the symptoms but the infection typically goes away on its own in 5 to 10 days.     SEEK IMMEDIATE MEDICAL CARE IF YOU HAVE ANY OF THE FOLLOWING SYMPTOMS: shortness of breath, chest pain, fever over 10 days, or lightheadedness/dizziness.     Allergic Reaction    An allergic reaction is an abnormal reaction to a substance (allergen) by the body's defense system. Common allergens include medicines, food, insect bites or stings, and blood products. The body releases certain proteins into the blood that can cause a variety of symptoms such as an itchy rash, wheezing, swelling of the face/lips/tongue/throat, abdominal pain, nausea or vomiting. An allergic reaction is usually treated with medication. If your health care provider prescribed you an epinephrine injection device, make sure to keep it with you at all times.    SEEK IMMEDIATE MEDICAL CARE IF YOU HAVE ANY OF THE FOLLOWING SYMPTOMS: allergic reaction severe enough that required you to use epinephrine, tightness in your chest, swelling around your lips/tongue/throat, abdominal pain, vomiting or diarrhea, or lightheadedness/dizziness. These symptoms may represent a serious problem that is an emergency. Do not wait to see if the symptoms will go away. Use your auto-injector pen or anaphylaxis kit as you have been instructed. Call 911 and do not drive yourself to the hospital.

## 2022-10-12 NOTE — ED ADULT NURSE NOTE - OBJECTIVE STATEMENT
AxOx4. Patient states she has an ear infection, was started on levaquin and now has sores in her mouth and feels like she is having an asthma attack. respirations even and unlabored, speaking in full sentences. Medicated as per orders.

## 2022-10-12 NOTE — ED PROVIDER NOTE - CLINICAL SUMMARY MEDICAL DECISION MAKING FREE TEXT BOX
43 yo female with pmhx of lymphoma (currently receiving no meds), mild intermittent asthma, HTN presents with nasal congestion, left ear pain x1wk. Pt was started on levaquin by PCP 43 yo female with pmhx of lymphoma (currently receiving no meds), mild intermittent asthma, HTN presents with nasal congestion, left ear pain x1wk. Pt was started on levaquin by PCP, switched to azithromycin incase of rxn to levaquin. instructed pt to stop taking the levaquin. provided pt with allergist f/u. No evidence of angioedema, or systemic rxn. pt's symptoms mostly likely all viral. strict return prcautions explained.

## 2022-10-12 NOTE — ED PROVIDER NOTE - PHYSICAL EXAMINATION
Gen: No acute distress, non toxic  HEENT: Mucous membranes moist, pink conjunctivae, EOMI. PERRL. Airway patent, uvula midline. Posterior pharynx   No evidence of angioedema.   CV: RRR, nl s1/s2.  Resp: CTAB, normal rate and effort. No wheezes, rhonchi, or crackles. No evidence of respiratory distress. Pt speaking in full sentences w/out difficulties.     GI: Abdomen soft, NT, ND. No rebound, no guarding  Neuro: A&O x4, MAEx4. 5/5 str ext x 4. Sensation intact, symmetric throughout. No FND's. Gait intact. CN 2-12 intact.   MSK: FROM UE and LE b/l.   Skin: No rashes, skin intact and well perfused.  Vascular: Radial and dorsalis pedal pulses 2+ b/l. Gen: No acute distress, non toxic  HEENT: Mucous membranes moist, pink conjunctivae, EOMI. PERRL. Airway patent, uvula midline. Posterior pharynx   No evidence of angioedema.   CV: RRR, nl s1/s2.  Resp: CTAB, normal rate and effort. No wheezes, rhonchi, or crackles. No evidence of respiratory distress. Pt speaking in full sentences w/out difficulties.     GI: Abdomen soft, NT, ND. No rebound, no guarding  Neuro: A&O x4, MAEx4. 5/5 str ext x 4. Sensation intact, symmetric throughout. No FND's. Gait intact. CN 2-12 intact.   MSK: FROM UE and LE b/l.   Skin: No rashes, skin intact and well perfused.  Vascular: Radial and dorsalis pedal pulses 2+ b/l.    +raised taste bud on rt side of tongue. no lesions in buccal mucosa/mouth Gen: No acute distress, non toxic  HEENT: Mucous membranes moist, pink conjunctivae, EOMI. PERRL. Airway patent, uvula midline. Mild erythema in posterior pharynx w/out tonsillar swelling or exudates. +raised taste bud on rt side of tongue. no lesions in buccal mucosa/mouth. No evidence of angioedema.   Neck: no cervical/submandibular LAD. FROM. No neck stiffness. Neck supple.   CV: RRR, nl s1/s2.  Resp: CTAB, normal rate and effort. No wheezes, rhonchi, or crackles. No evidence of respiratory distress. Pt speaking in full sentences w/out difficulties.   GI: Abdomen soft, NT, ND. No rebound, no guarding  Neuro: A&O x4, MAEx4. 5/5 str ext x 4. Sensation intact, symmetric throughout. No FND's. Gait intact. CN 2-12 intact.   MSK: FROM UE and LE b/l.   Skin: No rashes, skin intact and well perfused.  Vascular: Radial and dorsalis pedal pulses 2+ b/l.

## 2022-10-12 NOTE — ED ADULT TRIAGE NOTE - NSWEIGHTCALCTOOLDRUG_GEN_A_CORE
[Alert] : alert [No Acute Distress] : no acute distress [No Respiratory Distress] : no respiratory distress [No Accessory Muscle Use] : no accessory muscle use [Normal Rate and Effort] : normal respiratory rate and effort [Normal Rate] : heart rate was normal [No Galactorrhea] : no galactorrhea [Normal Bowel Sounds] : normal bowel sounds [Not Tender] : non-tender [Soft] : abdomen soft [No Stigmata of Cushings Syndrome] : no stigmata of Cushings Syndrome [Normal Affect] : the affect was normal  used [Normal Insight/Judgement] : insight and judgment were intact [Normal Mood] : the mood was normal [de-identified] : cervical collar

## 2022-10-14 NOTE — ED POST DISCHARGE NOTE - NSPOSTDCCALLS_ED_ALL_ED_NU
1 Cyclosporine Counseling:  I discussed with the patient the risks of cyclosporine including but not limited to hypertension, gingival hyperplasia,myelosuppression, immunosuppression, liver damage, kidney damage, neurotoxicity, lymphoma, and serious infections. The patient understands that monitoring is required including baseline blood pressure, CBC, CMP, lipid panel and uric acid, and then 1-2 times monthly CMP and blood pressure.

## 2022-11-07 DIAGNOSIS — C82.90 FOLLICULAR LYMPHOMA, UNSPECIFIED, UNSPECIFIED SITE: ICD-10-CM

## 2022-11-08 ENCOUNTER — APPOINTMENT (OUTPATIENT)
Dept: HEMATOLOGY ONCOLOGY | Facility: CLINIC | Age: 42
End: 2022-11-08

## 2022-12-27 ENCOUNTER — APPOINTMENT (OUTPATIENT)
Dept: OBGYN | Facility: CLINIC | Age: 42
End: 2022-12-27

## 2022-12-27 VITALS
SYSTOLIC BLOOD PRESSURE: 135 MMHG | WEIGHT: 155.2 LBS | HEIGHT: 60 IN | BODY MASS INDEX: 30.47 KG/M2 | DIASTOLIC BLOOD PRESSURE: 95 MMHG

## 2022-12-27 DIAGNOSIS — Z30.9 ENCOUNTER FOR CONTRACEPTIVE MANAGEMENT, UNSPECIFIED: ICD-10-CM

## 2022-12-27 PROCEDURE — 99214 OFFICE O/P EST MOD 30 MIN: CPT

## 2022-12-27 RX ORDER — ETONOGESTREL AND ETHINYL ESTRADIOL 11.7; 2.7 MG/1; MG/1
0.12-0.015 INSERT, EXTENDED RELEASE VAGINAL
Qty: 1 | Refills: 6 | Status: ACTIVE | COMMUNITY
Start: 2022-12-27 | End: 1900-01-01

## 2022-12-28 ENCOUNTER — APPOINTMENT (OUTPATIENT)
Dept: CARDIOLOGY | Facility: CLINIC | Age: 42
End: 2022-12-28

## 2022-12-28 ENCOUNTER — NON-APPOINTMENT (OUTPATIENT)
Age: 42
End: 2022-12-28

## 2022-12-28 VITALS
WEIGHT: 153 LBS | HEART RATE: 71 BPM | DIASTOLIC BLOOD PRESSURE: 82 MMHG | SYSTOLIC BLOOD PRESSURE: 136 MMHG | RESPIRATION RATE: 14 BRPM | HEIGHT: 64 IN | OXYGEN SATURATION: 98 % | BODY MASS INDEX: 26.12 KG/M2

## 2022-12-28 DIAGNOSIS — R06.02 SHORTNESS OF BREATH: ICD-10-CM

## 2022-12-28 PROCEDURE — 99205 OFFICE O/P NEW HI 60 MIN: CPT | Mod: 25

## 2022-12-28 PROCEDURE — 93000 ELECTROCARDIOGRAM COMPLETE: CPT

## 2022-12-28 NOTE — ASSESSMENT
[FreeTextEntry1] : 42F with hx of asthma, HTN, follicular lymphoma (low risk) on surveillance (has not received RT or chemo), who comes to the office referred by OBGYN as she is planning to become pregnant. \par She currently endorses shortness of breath on exertion, unsure if related to her asthma. \par Patient denies chest pain, no palpitations, PND, orthopnea,  leg edema,  claudication and syncope.\par \par #Shortness of breath on exertion \par will proceed with Echocardiogram \par \par #HTN\par well controlled with current meds \par if clear by oncology and willing to proceed with pregnancy patient will require to switch antihypertensive agents. \par Patient instructed to monitor BP at home and bring log to f/u.\par \par RTC with results. \par \par I appreciate the opportunity of working with you in the care of JIMENATA NAVARRETEALEON . If I may be of additional assistance, please do not hesitate to contact me. \par \par

## 2022-12-28 NOTE — HISTORY OF PRESENT ILLNESS
[FreeTextEntry1] : 42F with hx of asthma, HTN, follicular lymphoma (low risk) on surveillance (has not received RT or chemo), who comes to the office referred by OBGYN as she is planning to become pregnant. \par She currently endorses shortness of breath on exertion, unsure if related to her asthma. \par Patient denies chest pain, no palpitations, PND, orthopnea,  leg edema,  claudication and syncope.\par

## 2023-01-01 NOTE — ED ADULT NURSE NOTE - ED COMFORT CARE
NB noted to be grunting after 18 mins of life after transitioning skin to skin; brought to warmer NB nasal flaring and retracting, CPAP started at that time Patient informed

## 2023-01-01 NOTE — ED ADULT NURSE NOTE - NS ED NOTE ABUSE SUSPICION NEGLECT YN
Mother states, "My babies both have redness and irritation of the penis. It is not swollen, no open areas but is is very red and I would like an appointment around 3:30 tomorrow. "    [facility] doesn't have any appointments open at that time tomorrow, would you like to see if [facility] does?      Mother states, "Yes, We can take them to [facility]. "    Appointment tomorrow [id number] [phone] no

## 2023-01-03 ENCOUNTER — APPOINTMENT (OUTPATIENT)
Dept: CARDIOLOGY | Facility: CLINIC | Age: 43
End: 2023-01-03
Payer: COMMERCIAL

## 2023-01-03 PROCEDURE — 93306 TTE W/DOPPLER COMPLETE: CPT

## 2023-01-04 NOTE — ED PROVIDER NOTE - CPE EDP ENMT NORM
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  He underwent a bifrontotemporoparietal craniectomy for evacuation of bilateral subdural empyema on 10/28/2022. OR cultures (+) Streptococcus intermedius.  He went back to the OR on 11/09/2022 for left craniotomy for repeat evacuation of subdural empyema and EVD placement and again on 11/11/2022 for a posterior craniotomy and empyema evacuation. EVD removed on 11/29/2022.  IV antibiotic course is completed.  He is accompanied by his mother and father this morning.  Family reports no acute concerns.  Vini denies headaches.  Vini denies nausea or emesis.  Family reports bifrontal cranial flat has remained flat and edema is resolved.  He continues to take PO vimpat and keppra.  No concern for seizure activity.     Review of Systems   Constitutional: Negative for fever and irritability.   HENT: Negative for congestion and rhinorrhea.    Eyes: Negative for photophobia and visual disturbance.   Respiratory: Negative for apnea, shortness of breath and wheezing.    Cardiovascular: Negative for chest pain and leg swelling.   Gastrointestinal: Negative for nausea and vomiting.   Genitourinary: Negative for decreased urine volume and difficulty urinating.   Musculoskeletal: Negative for gait problem, neck pain and neck stiffness.   Skin: Negative for rash and wound.   Neurological: Negative for seizures and headaches.   Hematological: Negative for adenopathy. Does not bruise/bleed easily.   Psychiatric/Behavioral:  Negative for behavioral problems and confusion.     Patient Active Problem List   Diagnosis   • Acute bacterial sinusitis   • Subdural empyema   • Cerebritis   • Thrombosis of superior sagittal sinus   • Influenza   • Acute nonintractable headache, unspecified headache type     Past Medical History:   Diagnosis Date   • Subdural empyema 10/28/2022    Streptococcus intermedius   • Thrombosis of sagittal sinus 10/28/2022     Past Surgical History:   Procedure Laterality Date   • Anes craniectomy; exc brain abscess-supr Left 10/28/2022    Bifrontotemporoparietal craniectomy for evacuation of bilateral subdural empyema   • Craniotomy Left 11/09/2022    Left craniotomy for subdural and abscess. Placement of EVD   • Craniotomy Left 11/11/2022    Left occipital craniotomy and evacuation of intracerebral abscess   • Intracranial pressure / external ventricular device management Left 11/09/2022    Removed 11/29/2022 at bedside     History reviewed. No pertinent family history.    Social History     Social History Narrative   • Not on file     Preferred language is English [1].    Current Outpatient Medications   Medication Sig Dispense Refill   • fluticasone (FLONASE) 50 MCG/ACT nasal spray Spray 2 sprays in each nostril daily. 16 g 1   • oxymetazoline (AFRIN) 0.05 % nasal spray Spray 2 sprays in each nostril in the morning and 2 sprays in the evening. 30 mL 1   • lacosamide ORAL (VIMPAT) 10 MG/ML oral solution Take 15 mLs by mouth daily. Divide dose to take 5mL by mouth in the morning and 10mL by mouth in the evening. 300 mL 1   • ibuprofen (CHILDRENS ADVIL) 100 MG/5ML suspension Take 19.8 mLs by mouth every 6 hours as needed for Fever (Give if patient is unable to receive acetaminophen first.).     • CARBOXYMethylcellulose (REFRESH TEARS) 0.5 % ophthalmic solution Place 1 drop into both eyes 4 times daily as needed (dry eyes). 15 mL 1   • levETIRAcetam ORAL (KepPRA) 100 MG/ML oral solution Take 12 mLs by mouth every 12  hours. 720 mL 1   • melatonin 1 MG/ML solution Take 3 mLs by mouth nightly as needed (Insomnia). 30 mL 1   • acetaminophen (TYLENOL) 160 MG/5ML suspension Take 18.6 mLs by mouth every 6 hours as needed for Fever.       No current facility-administered medications for this visit.     ALLERGIES:   Allergen Reactions   • Amoxicillin HIVES   • Seasonal Runny Nose     Objective  Visit Vitals  Ht 4' 9.68\" (1.465 m)   Wt 37.1 kg (81 lb 12.7 oz)   BMI 17.29 kg/m²     Physical Exam  Vitals reviewed  Constitutional:    He is in no acute distress.  He is awake and active today in good spirits.   HENT:   Bifrontal craniectomy is open to air and healing well.  Posterior craniotomy is open to air and healing well.  Frontal flap is flat and sunken.  Previously noted edema is completely resolved.   Neurological Exam  He is awake and oriented x 3.   He answers all questions and responds to all commands.   Speech is clear.   Pupils are equal, round, and reactive (3 mm)  Face is symmetric.    He moves all extremities spontaneously.  Sensation intact.  Coordination is intact.  Gait is intact and steady.     Imaging:   I have reviewed the pertinent imaging study reports. These are the pertinent findings:  • MRI brain W/WO 12/19/2022 IMPRESSION:Evolving blood products in the left frontal lobe (more T1 hyperintense) Similar bifrontal and left temporal FLAIR abnormality. Similar dural thickening and enhancement of the left middle cranial fossa  and left tentorial leaflet. No hydrocephalus. No acute infarct. No new areas of enhancement    Assessment/Plan:  Vini is a 14 year old male with history of bilateral subdural empyemas who underwent multiple surgical procedures including a bifrontotemporoparietal craniectomy, repeat left craniotomy and most recently a posterior craniotomy for evacuation.  IV antibiotic course completed.  He is clinically doing very well and is completely asymptomatic today.  Bifrontal cranial flap is sunken and we  will begin to plan for his cranioplasty with autologous bone.  Surgical indication as well as potential risks/complications was discussed with mother and father.  The family agrees and would like to proceed with surgery.     Patient continues to do well.    At his neurological baseline.  Wound healed  craniectomy flap soft  I again summarized the imaging results in detail with family.   I see no great gain by waiting more for the surgery.  Family understands cranioplasty procedure.  I d/w them timing, indications, possible risks and complications of cranioplasty. They understand different options, techniques, hardware and possible risks and complications including but not limited to: infection (that might require removal of hardware, prolonged hospital stay, prolonged administration of antibiotics), hemorrhage, injury to the brain, stroke, temporary or permanent neurological deficit, hemiparesis, paralysis, speech/memory/higher cognitive functions deficits, wound healing issues, CSF leak, pseudomeningocele, need for wound revision, need for additional procedures in the near or fr future, coma, death.  Family understands that it might not be possible to fit bone and if there is pressure from the brain that prohibits this we might need to abort procedure or alternatively place EVD (external ventricular drain) to decompress ventricles in order to facilitate bone placement.  If an EVD is placed, child might need another procedure - shunt placement.   I made it clear that fluid dynamics might change, and child might still need CSF diverting procedure (regardless if we put an EVD or not) in the near or far future.  I extensively discussed about our experience with cranioplasties and reviewed literature and told mom that there is a possibility that the autologous bone gets absorbed in the future in which case he will be a candidate for an implant.  Family understands that autologous bone will not cover all resected area.      I spent 10 minutes before the visit reviewing existing imaging studies and available physician notes.  I spent 15 minutes with the patient reviewing history, examining, and discussing treatment options.  I spent 10 minutes after the visit coordinating care with other providers and ordering the necessary studies.      On 01/04/23, I, Anjana Cardenas DNP, CPNP-AC, scribed the services personally performed by Dr. Ellis Witt.   I, Dr. Witt, saw patient and agree with scribe's note.  DCN     normal...

## 2023-01-09 ENCOUNTER — EMERGENCY (EMERGENCY)
Facility: HOSPITAL | Age: 43
LOS: 1 days | End: 2023-01-09
Admitting: EMERGENCY MEDICINE
Payer: COMMERCIAL

## 2023-01-09 VITALS
HEART RATE: 75 BPM | WEIGHT: 157.19 LBS | DIASTOLIC BLOOD PRESSURE: 88 MMHG | RESPIRATION RATE: 18 BRPM | TEMPERATURE: 98 F | HEIGHT: 64 IN | OXYGEN SATURATION: 98 % | SYSTOLIC BLOOD PRESSURE: 140 MMHG

## 2023-01-09 LAB
RAPID RVP RESULT: SIGNIFICANT CHANGE UP
SARS-COV-2 RNA SPEC QL NAA+PROBE: SIGNIFICANT CHANGE UP

## 2023-01-09 PROCEDURE — 70450 CT HEAD/BRAIN W/O DYE: CPT | Mod: MA

## 2023-01-09 PROCEDURE — 99284 EMERGENCY DEPT VISIT MOD MDM: CPT | Mod: 25

## 2023-01-09 PROCEDURE — 0225U NFCT DS DNA&RNA 21 SARSCOV2: CPT

## 2023-01-09 PROCEDURE — 70490 CT SOFT TISSUE NECK W/O DYE: CPT | Mod: MA

## 2023-01-09 PROCEDURE — 99284 EMERGENCY DEPT VISIT MOD MDM: CPT

## 2023-01-09 RX ORDER — ACETAMINOPHEN 500 MG
650 TABLET ORAL ONCE
Refills: 0 | Status: COMPLETED | OUTPATIENT
Start: 2023-01-09 | End: 2023-01-09

## 2023-01-09 RX ORDER — IBUPROFEN 200 MG
600 TABLET ORAL ONCE
Refills: 0 | Status: COMPLETED | OUTPATIENT
Start: 2023-01-09 | End: 2023-01-09

## 2023-01-09 RX ORDER — PSEUDOEPHEDRINE HCL 30 MG
30 TABLET ORAL ONCE
Refills: 0 | Status: COMPLETED | OUTPATIENT
Start: 2023-01-09 | End: 2023-01-09

## 2023-01-09 RX ADMIN — Medication 650 MILLIGRAM(S): at 08:06

## 2023-01-09 RX ADMIN — Medication 600 MILLIGRAM(S): at 08:07

## 2023-01-09 RX ADMIN — Medication 30 MILLIGRAM(S): at 08:06

## 2023-01-09 RX ADMIN — Medication 60 MILLIGRAM(S): at 08:07

## 2023-01-09 NOTE — ED PROVIDER NOTE - OBJECTIVE STATEMENT
This is a 42 year old female here for nasal congestion and headache x 1 week.  She is in ED with daughter with symptoms starting yesterday.  She endorses using nasal spray, tylenol and motrin last dose yesterday with no relief.  She reports pmhx of lymphoma in neck being followed by oncologist MD Rodriguez at [facility].  She underwent PET scan 12/1.  She reports is just being monitored, had biopsy for confirmation, denies being treated with chemo or radiation.  She denies any cough, recent travel or rashes, n/v/d or any abdominal pain.  Patient is UTD with covid vaccines with booster, denies getting flu vaccine.  LMP 12/25.

## 2023-01-09 NOTE — ED PROVIDER NOTE - PHYSICAL EXAMINATION
Constitutional - well-developed; well nourished. Head - NCAT. Airway patent. Ear: R ear +TM erythema, +sinus tenderness, Eyes - PERRL. CV - RRR. no murmur. no edema. Pulm - CTAB. Abd - soft, nt. no rebound. no guarding. Neuro - A&Ox3. strength 5/5 x4. sensation intact x4. normal gait. Skin - No rash. MSK - normal ROM.

## 2023-01-09 NOTE — ED ADULT NURSE NOTE - NS ED NURSE LEVEL OF CONSCIOUSNESS ORIENTATION
Spoke with Dr. Rivera and patients D-Dimer result came back negative and that there are no concerns for a blood clot. Called patient and informed her of this. Patient verbalized understanding and agrees with plan.     Flor Porter RN, BSN  [facility] [place]      
Oriented - self; Oriented - place; Oriented - time

## 2023-01-09 NOTE — ED PROVIDER NOTE - CLINICAL SUMMARY MEDICAL DECISION MAKING FREE TEXT BOX
sinus pressure/headache with h/o lymphoma: check CT head soft tissue neck, TX with tylenol and motrin, add on steroids, check RVP

## 2023-01-09 NOTE — ED PROVIDER NOTE - PATIENT PORTAL LINK FT
You can access the FollowMyHealth Patient Portal offered by [facility] by registering at the following website: [email or web address]. By joining [facility]’s FollowMyHealth portal, you will also be able to view your health information using other applications (apps) compatible with our system.

## 2023-01-09 NOTE — ED PROVIDER NOTE - CARE PLAN
1 Principal Discharge DX:	Headache  Secondary Diagnosis:	Acute otitis media  Secondary Diagnosis:	Sinus mucosal thickening

## 2023-01-09 NOTE — ED ADULT NURSE NOTE - OBJECTIVE STATEMENT
Assumed care of pt at 0726. Presents to ER c/o fever, sore throat and body aches. Pt is a&ox3. Pt afebrile,  98% on RA. RR even and unlabored. No c/o N/V/D.

## 2023-01-09 NOTE — ED PROVIDER NOTE - PROGRESS NOTE DETAILS
viral panel negative, pending CT CT reviewed with patient Similar appearing left sided  level 4 and supraclavicular adenopathy patient already being followed by heme/onc.  Given copies of all results, CT +sinus thickening, slight erythema on R TM, will TX with Augmentin, f/u with PCP.

## 2023-01-09 NOTE — ED PROVIDER NOTE - NS ED ROS FT
No fever/chills, No photophobia/eye pain/changes in vision, +nasal congestion, No ear pain/sore throat/dysphagia, No chest pain/palpitations, no SOB/cough/wheeze/stridor, No abdominal pain, No N/V/D, no dysuria/frequency/discharge, No neck/back pain, no rash, +headache.

## 2023-02-01 ENCOUNTER — APPOINTMENT (OUTPATIENT)
Dept: CARDIOLOGY | Facility: CLINIC | Age: 43
End: 2023-02-01
Payer: COMMERCIAL

## 2023-02-01 VITALS
HEIGHT: 64 IN | BODY MASS INDEX: 26.46 KG/M2 | OXYGEN SATURATION: 99 % | DIASTOLIC BLOOD PRESSURE: 80 MMHG | SYSTOLIC BLOOD PRESSURE: 126 MMHG | RESPIRATION RATE: 16 BRPM | HEART RATE: 68 BPM | WEIGHT: 155 LBS

## 2023-02-01 PROCEDURE — 99213 OFFICE O/P EST LOW 20 MIN: CPT

## 2023-02-01 NOTE — ASSESSMENT
[FreeTextEntry1] : 42F with hx of asthma, HTN, follicular lymphoma (low risk) on surveillance (has not received RT or chemo), who came  to the office last time referred by OBGYN as she is planning to become pregnant. \par During last visit patient was reporting shortness of breath on exertion, unsure if related to her asthma. \par On last visit patient was ordered a TTE, today she is here to review results.\par Patient denies chest pain, no palpitations, PND, orthopnea,  leg edema,  claudication and syncope.\par \par #Shortness of breath on exertion /PHTN \par Echo 1/3/23: Normal biventricular systolic function, mild TR, RVSP/PASP 40mmHg\par patient to be seen by pulmonary MD to assess for PHTN secondary to lung dz \par \par #HTN\par well controlled with current meds  \par Patient instructed to monitor BP at home and bring log to f/u.\par \par RTC in a month post pulm eval,\par \par \par \par

## 2023-02-01 NOTE — CARDIOLOGY SUMMARY
[de-identified] : 12/28/22: VIKTORIA [de-identified] : 1/3/23: Normal biventricular systolic function, mild TR, RVSP/PASP 40mmHg

## 2023-02-01 NOTE — HISTORY OF PRESENT ILLNESS
[FreeTextEntry1] : 42F with hx of asthma, HTN, follicular lymphoma (low risk) on surveillance (has not received RT or chemo), who came  to the office last time referred by OBGYN as she is planning to become pregnant. \par During last visit patient was reporting shortness of breath on exertion, unsure if related to her asthma. \par On last visit patient was ordered a TTE, today she is here to review results.\par Patient denies chest pain, no palpitations, PND, orthopnea,  leg edema,  claudication and syncope.\par

## 2023-03-09 ENCOUNTER — APPOINTMENT (OUTPATIENT)
Dept: CARDIOLOGY | Facility: CLINIC | Age: 43
End: 2023-03-09
Payer: COMMERCIAL

## 2023-03-09 VITALS
WEIGHT: 153 LBS | DIASTOLIC BLOOD PRESSURE: 80 MMHG | HEART RATE: 73 BPM | BODY MASS INDEX: 26.12 KG/M2 | HEIGHT: 64 IN | RESPIRATION RATE: 16 BRPM | OXYGEN SATURATION: 99 % | SYSTOLIC BLOOD PRESSURE: 120 MMHG

## 2023-03-09 DIAGNOSIS — I10 ESSENTIAL (PRIMARY) HYPERTENSION: ICD-10-CM

## 2023-03-09 DIAGNOSIS — I27.20 PULMONARY HYPERTENSION, UNSPECIFIED: ICD-10-CM

## 2023-03-09 PROCEDURE — 99213 OFFICE O/P EST LOW 20 MIN: CPT

## 2023-03-09 NOTE — CARDIOLOGY SUMMARY
[de-identified] : 12/28/22: VIKTORIA [de-identified] : 1/3/23: Normal biventricular systolic function, mild TR, RVSP/PASP 40mmHg

## 2023-03-09 NOTE — HISTORY OF PRESENT ILLNESS
[FreeTextEntry1] : 43F with hx of asthma, HTN, follicular lymphoma (low risk) on surveillance (has not received RT or chemo), who originally came  to the office  referred by OBGYN as she is planning to become pregnant. \par During last visit patient was reporting shortness of breath on exertion, unsure if related to her asthma. \par Most recent echo with slight elevation of RSVP/PASP \par Patient denies chest pain, no palpitations, PND, orthopnea,  leg edema,  claudication and syncope.\par

## 2023-03-09 NOTE — ASSESSMENT
[FreeTextEntry1] : 43F with hx of asthma, HTN, follicular lymphoma (low risk) on surveillance (has not received RT or chemo), who originally came  to the office  referred by OBGYN as she is planning to become pregnant. \par During last visit patient was reporting shortness of breath on exertion, unsure if related to her asthma. \par Most recent echo with slight elevation of RSVP/PASP \par Patient denies chest pain, no palpitations, PND, orthopnea,  leg edema,  claudication and syncope.\par \par #Shortness of breath on exertion /PHTN \par Echo 1/3/23: Normal biventricular systolic function, mild TR, RVSP/PASP 40mmHg\par patient to be seen by pulmonary MD ON 4/1 to assess for PHTN secondary to lung dz \par \par #HTN\par well controlled with current meds  \par REfill provided\par Patient instructed to monitor BP at home and bring log to f/u.\par \par RTC in 6 months \par \par \par \par

## 2023-03-31 ENCOUNTER — RESULT CHARGE (OUTPATIENT)
Age: 43
End: 2023-03-31

## 2023-03-31 ENCOUNTER — APPOINTMENT (OUTPATIENT)
Dept: OBGYN | Facility: CLINIC | Age: 43
End: 2023-03-31
Payer: COMMERCIAL

## 2023-03-31 VITALS
WEIGHT: 154 LBS | SYSTOLIC BLOOD PRESSURE: 118 MMHG | HEIGHT: 64 IN | BODY MASS INDEX: 26.29 KG/M2 | DIASTOLIC BLOOD PRESSURE: 70 MMHG

## 2023-03-31 PROCEDURE — 99213 OFFICE O/P EST LOW 20 MIN: CPT

## 2023-04-03 LAB
BASOPHILS # BLD AUTO: 0.06 K/UL
BASOPHILS NFR BLD AUTO: 0.8 %
EOSINOPHIL # BLD AUTO: 0.3 K/UL
EOSINOPHIL NFR BLD AUTO: 4.1 %
FSH SERPL-MCNC: 2.3 IU/L
HCG UR QL: NEGATIVE
HCT VFR BLD CALC: 38.2 %
HGB BLD-MCNC: 13.3 G/DL
IMM GRANULOCYTES NFR BLD AUTO: 0.1 %
LH SERPL-ACNC: 4.8 IU/L
LYMPHOCYTES # BLD AUTO: 2.95 K/UL
LYMPHOCYTES NFR BLD AUTO: 40 %
MAN DIFF?: NORMAL
MCHC RBC-ENTMCNC: 32 PG
MCHC RBC-ENTMCNC: 34.8 GM/DL
MCV RBC AUTO: 92 FL
MONOCYTES # BLD AUTO: 0.5 K/UL
MONOCYTES NFR BLD AUTO: 6.8 %
NEUTROPHILS # BLD AUTO: 3.55 K/UL
NEUTROPHILS NFR BLD AUTO: 48.2 %
PLATELET # BLD AUTO: 236 K/UL
PROLACTIN SERPL-MCNC: 15.8 NG/ML
QUALITY CONTROL: YES
RBC # BLD: 4.15 M/UL
RBC # FLD: 12.6 %
T3FREE SERPL-MCNC: 2.68 PG/ML
T4 FREE SERPL-MCNC: 1.2 NG/DL
TESTOST SERPL-MCNC: 3.2 NG/DL
TSH SERPL-ACNC: 1.06 UIU/ML
WBC # FLD AUTO: 7.37 K/UL

## 2023-04-10 LAB — ANTI-MUELLERIAN HORMONE: 4.14 NG/ML

## 2023-04-21 ENCOUNTER — APPOINTMENT (OUTPATIENT)
Dept: PULMONOLOGY | Facility: CLINIC | Age: 43
End: 2023-04-21

## 2023-05-04 ENCOUNTER — EMERGENCY (EMERGENCY)
Facility: HOSPITAL | Age: 43
LOS: 1 days | Discharge: HOME | End: 2023-05-04
Attending: EMERGENCY MEDICINE
Payer: COMMERCIAL

## 2023-05-04 VITALS
DIASTOLIC BLOOD PRESSURE: 78 MMHG | HEART RATE: 73 BPM | HEIGHT: 64 IN | RESPIRATION RATE: 18 BRPM | WEIGHT: 145.06 LBS | TEMPERATURE: 98 F | OXYGEN SATURATION: 97 % | SYSTOLIC BLOOD PRESSURE: 121 MMHG

## 2023-05-04 DIAGNOSIS — Z98.890 OTHER SPECIFIED POSTPROCEDURAL STATES: Chronic | ICD-10-CM

## 2023-05-04 LAB
ANION GAP SERPL CALC-SCNC: 10 MMOL/L — SIGNIFICANT CHANGE UP (ref 5–17)
BASOPHILS # BLD AUTO: 0.05 K/UL — SIGNIFICANT CHANGE UP (ref 0–0.2)
BASOPHILS NFR BLD AUTO: 0.8 % — SIGNIFICANT CHANGE UP (ref 0–2)
BUN SERPL-MCNC: 10.5 MG/DL — SIGNIFICANT CHANGE UP (ref 8–20)
CALCIUM SERPL-MCNC: 8.7 MG/DL — SIGNIFICANT CHANGE UP (ref 8.4–10.5)
CHLORIDE SERPL-SCNC: 103 MMOL/L — SIGNIFICANT CHANGE UP (ref 96–108)
CO2 SERPL-SCNC: 24 MMOL/L — SIGNIFICANT CHANGE UP (ref 22–29)
CREAT SERPL-MCNC: 0.6 MG/DL — SIGNIFICANT CHANGE UP (ref 0.5–1.3)
EGFR: 116 ML/MIN/1.73M2 — SIGNIFICANT CHANGE UP
EOSINOPHIL # BLD AUTO: 0.25 K/UL — SIGNIFICANT CHANGE UP (ref 0–0.5)
EOSINOPHIL NFR BLD AUTO: 3.9 % — SIGNIFICANT CHANGE UP (ref 0–6)
GLUCOSE SERPL-MCNC: 88 MG/DL — SIGNIFICANT CHANGE UP (ref 70–99)
HCG SERPL-ACNC: <4 MIU/ML — SIGNIFICANT CHANGE UP
HCT VFR BLD CALC: 38.2 % — SIGNIFICANT CHANGE UP (ref 34.5–45)
HGB BLD-MCNC: 13.4 G/DL — SIGNIFICANT CHANGE UP (ref 11.5–15.5)
IMM GRANULOCYTES NFR BLD AUTO: 0 % — SIGNIFICANT CHANGE UP (ref 0–0.9)
LYMPHOCYTES # BLD AUTO: 2.56 K/UL — SIGNIFICANT CHANGE UP (ref 1–3.3)
LYMPHOCYTES # BLD AUTO: 39.4 % — SIGNIFICANT CHANGE UP (ref 13–44)
MCHC RBC-ENTMCNC: 32.3 PG — SIGNIFICANT CHANGE UP (ref 27–34)
MCHC RBC-ENTMCNC: 35.1 GM/DL — SIGNIFICANT CHANGE UP (ref 32–36)
MCV RBC AUTO: 92 FL — SIGNIFICANT CHANGE UP (ref 80–100)
MONOCYTES # BLD AUTO: 0.66 K/UL — SIGNIFICANT CHANGE UP (ref 0–0.9)
MONOCYTES NFR BLD AUTO: 10.2 % — SIGNIFICANT CHANGE UP (ref 2–14)
NEUTROPHILS # BLD AUTO: 2.97 K/UL — SIGNIFICANT CHANGE UP (ref 1.8–7.4)
NEUTROPHILS NFR BLD AUTO: 45.7 % — SIGNIFICANT CHANGE UP (ref 43–77)
PLATELET # BLD AUTO: 203 K/UL — SIGNIFICANT CHANGE UP (ref 150–400)
POTASSIUM SERPL-MCNC: 4.3 MMOL/L — SIGNIFICANT CHANGE UP (ref 3.5–5.3)
POTASSIUM SERPL-SCNC: 4.3 MMOL/L — SIGNIFICANT CHANGE UP (ref 3.5–5.3)
RBC # BLD: 4.15 M/UL — SIGNIFICANT CHANGE UP (ref 3.8–5.2)
RBC # FLD: 12.6 % — SIGNIFICANT CHANGE UP (ref 10.3–14.5)
SODIUM SERPL-SCNC: 137 MMOL/L — SIGNIFICANT CHANGE UP (ref 135–145)
WBC # BLD: 6.49 K/UL — SIGNIFICANT CHANGE UP (ref 3.8–10.5)
WBC # FLD AUTO: 6.49 K/UL — SIGNIFICANT CHANGE UP (ref 3.8–10.5)

## 2023-05-04 PROCEDURE — 80048 BASIC METABOLIC PNL TOTAL CA: CPT

## 2023-05-04 PROCEDURE — 99284 EMERGENCY DEPT VISIT MOD MDM: CPT | Mod: 25

## 2023-05-04 PROCEDURE — 84702 CHORIONIC GONADOTROPIN TEST: CPT

## 2023-05-04 PROCEDURE — 99284 EMERGENCY DEPT VISIT MOD MDM: CPT

## 2023-05-04 PROCEDURE — 36415 COLL VENOUS BLD VENIPUNCTURE: CPT

## 2023-05-04 PROCEDURE — 70487 CT MAXILLOFACIAL W/DYE: CPT | Mod: 26,MA

## 2023-05-04 PROCEDURE — 70487 CT MAXILLOFACIAL W/DYE: CPT | Mod: MA

## 2023-05-04 PROCEDURE — 85025 COMPLETE CBC W/AUTO DIFF WBC: CPT

## 2023-05-04 RX ORDER — IBUPROFEN 200 MG
1 TABLET ORAL
Qty: 20 | Refills: 0
Start: 2023-05-04 | End: 2023-05-08

## 2023-05-04 RX ORDER — IBUPROFEN 200 MG
600 TABLET ORAL ONCE
Refills: 0 | Status: COMPLETED | OUTPATIENT
Start: 2023-05-04 | End: 2023-05-04

## 2023-05-04 RX ADMIN — Medication 600 MILLIGRAM(S): at 05:40

## 2023-05-04 NOTE — ED PROVIDER NOTE - NSFOLLOWUPINSTRUCTIONS_ED_ALL_ED_FT
Please take medications as prescribed    Follow up with your PCP    Return if symptoms worsen or persist

## 2023-05-04 NOTE — ED PROVIDER NOTE - NS ED ROS FT
Gen: +fever.   Skin: denies rashes  HEENT: +rt cheek swelling and pain. denies visual changes, ear pain, nasal congestion, throat pain  Respiratory: denies JACOBSON, SOB, cough  Cardiovascular: denies chest pain, palpitations  GI: denies abdominal pain, n/v/d  : denies dysuria, frequency, urgency, bowel/bladder incontinence  MSK: denies joint swelling/pain, back pain, neck pain  Neuro: denies headache, dizziness, LOC, weakness, numbness

## 2023-05-04 NOTE — ED PROVIDER NOTE - ATTENDING APP SHARED VISIT CONTRIBUTION OF CARE
43y F w/ hx lymphoma (not on treatment), HTN; presents for facial swelling. Pt reports onset yesterday of swelling/pain to R cheek. Had 103 fever at home prior to arrival. Took ibuprofen at 8 PM. No dental pain, cough, congestion, vomiting. On exam, pt with swelling/induration to right buccal area, no dental abscess noted. Tolerating own secretions. Lungs clear. Afebrile in the ED. DDx includes sialoadenitis, viral parotitis, abscess. Check labs, CT maxillofacial. F/u with ENT.

## 2023-05-04 NOTE — ED PROVIDER NOTE - PHYSICAL EXAMINATION
Gen: No acute distress, non toxic  HEENT: Mucous membranes moist, pink conjunctivae, EOMI. PERRL. Airway patent. +rt cheek swelling with induration near the parotid gland. multiple dental caries throughout mouth. no gum erythema or visualized abscess.   CV: RRR, nl s1/s2.  Resp: CTAB, normal rate and effort  GI: Abdomen soft, NT, ND. No rebound, no guarding  Neuro: A&O x 3, moving all 4 extremities  MSK: No spine or joint ttp  Skin: No rashes. intact and perfused.

## 2023-05-04 NOTE — ED PROVIDER NOTE - PATIENT PORTAL LINK FT
You can access the FollowMyHealth Patient Portal offered by [facility] by registering at the following website: [email or web address]. By joining [employer]’s FollowMyHealth portal, you will also be able to view your health information using other applications (apps) compatible with our system.

## 2023-05-04 NOTE — ED PROVIDER NOTE - CLINICAL SUMMARY MEDICAL DECISION MAKING FREE TEXT BOX
42 yo female with pmhx of lymphoma, HTN, asthma presents with rt cheek swelling since yesterday. Labs, meds and imaging to rule out infectious processes including abscess. No leukocytosis, no left shift, no electrolyte abnl.

## 2023-05-04 NOTE — ED PROVIDER NOTE - OBJECTIVE STATEMENT
42 yo female with pmhx of lymphoma, HTN, asthma presents with rt cheek swelling since yesterday. States she got an injection yesterday for allergies, first time she got it. Later in the day noticed that her rt cheek was swollen and painful. Denies ever experiencing this before. Denies scratching or itching in the throat, difficulties breathing. Has been able to eat/drink on that right side. Denies any pus or drainage coming from the rt side of the mouth. States she had a fever yesterday of 103F, took advil around 8pm.   Denies chills, body aches, dizziness, LOC, vision changes, CP, palpitations, SOB, abd pain, N/V/C/D, difficulties swallowing, paresthesias in the extremities, rashes. Denies any dental pain or known dental infection. 42 yo female with pmhx of lymphoma, HTN, asthma presents with rt cheek swelling since yesterday. States she got an injection yesterday for allergies, first time she got it. Later in the day noticed that her rt cheek was swollen and painful. Denies ever experiencing this before. Denies scratching or itching in the throat, difficulties breathing. Has been able to eat/drink on that right side. Denies any pus or drainage coming from the rt side of the mouth. States she had a fever yesterday of 103F, took advil around 8pm. Denies any recent dental procedures. Denies chills, body aches, dizziness, LOC, vision changes, CP, palpitations, SOB, abd pain, N/V/C/D, difficulties swallowing, paresthesias in the extremities, rashes. Denies any dental pain or known dental infection.

## 2023-05-04 NOTE — ED ADULT NURSE NOTE - OBJECTIVE STATEMENT
PT reports to ED with complaints of facial swelling, airway patent and speaking in unbroken sentence.

## 2023-05-04 NOTE — ED ADULT TRIAGE NOTE - CHIEF COMPLAINT QUOTE
ambulatory c/o facial (right) swelling, woke up today to her face being swollen. denies any teeth involvement. 9/10 endorses having a allergy shot yesterday due to seasonal allergies.

## 2023-05-11 ENCOUNTER — APPOINTMENT (OUTPATIENT)
Dept: OBGYN | Facility: CLINIC | Age: 43
End: 2023-05-11
Payer: COMMERCIAL

## 2023-05-11 VITALS
WEIGHT: 155 LBS | DIASTOLIC BLOOD PRESSURE: 82 MMHG | HEIGHT: 64 IN | BODY MASS INDEX: 26.46 KG/M2 | SYSTOLIC BLOOD PRESSURE: 130 MMHG

## 2023-05-11 DIAGNOSIS — Z01.419 ENCOUNTER FOR GYNECOLOGICAL EXAMINATION (GENERAL) (ROUTINE) W/OUT ABNORMAL FINDINGS: ICD-10-CM

## 2023-05-11 PROCEDURE — 99214 OFFICE O/P EST MOD 30 MIN: CPT

## 2023-06-23 ENCOUNTER — APPOINTMENT (OUTPATIENT)
Dept: PULMONOLOGY | Facility: CLINIC | Age: 43
End: 2023-06-23

## 2023-06-26 NOTE — ED STATDOCS - SKIN, MLM
skin normal color for race, warm, dry and intact except for eczema to R hand Tetracycline Counseling: Patient counseled regarding possible photosensitivity and increased risk for sunburn.  Patient instructed to avoid sunlight, if possible.  When exposed to sunlight, patients should wear protective clothing, sunglasses, and sunscreen.  The patient was instructed to call the office immediately if the following severe adverse effects occur:  hearing changes, easy bruising/bleeding, severe headache, or vision changes.  The patient verbalized understanding of the proper use and possible adverse effects of tetracycline.  All of the patient's questions and concerns were addressed. Patient understands to avoid pregnancy while on therapy due to potential birth defects.

## 2023-07-05 NOTE — ED PROVIDER NOTE - WR ORDER DATE AND TIME 1

## 2023-07-20 ENCOUNTER — APPOINTMENT (OUTPATIENT)
Dept: OBGYN | Facility: CLINIC | Age: 43
End: 2023-07-20
Payer: COMMERCIAL

## 2023-07-20 VITALS
BODY MASS INDEX: 29.48 KG/M2 | SYSTOLIC BLOOD PRESSURE: 120 MMHG | DIASTOLIC BLOOD PRESSURE: 82 MMHG | HEIGHT: 60 IN | WEIGHT: 150.19 LBS

## 2023-07-20 PROCEDURE — 99214 OFFICE O/P EST MOD 30 MIN: CPT

## 2023-07-20 RX ORDER — PRENATAL VIT NO.130/IRON/FOLIC 27MG-0.8MG
28-0.8 TABLET ORAL DAILY
Qty: 90 | Refills: 3 | Status: ACTIVE | COMMUNITY
Start: 2022-12-27 | End: 1900-01-01

## 2023-07-20 NOTE — ED ADULT TRIAGE NOTE - HEIGHT IN CM
preliminary duplex reports reveal acute L DVTs. unable to reach MD. Will hold PT and follow up as appropriate.
165.1

## 2023-08-17 ENCOUNTER — APPOINTMENT (OUTPATIENT)
Dept: OBGYN | Facility: CLINIC | Age: 43
End: 2023-08-17
Payer: COMMERCIAL

## 2023-08-17 VITALS
WEIGHT: 147 LBS | HEIGHT: 60 IN | SYSTOLIC BLOOD PRESSURE: 116 MMHG | DIASTOLIC BLOOD PRESSURE: 74 MMHG | BODY MASS INDEX: 28.86 KG/M2

## 2023-08-17 DIAGNOSIS — N97.9 FEMALE INFERTILITY, UNSPECIFIED: ICD-10-CM

## 2023-08-17 PROCEDURE — 99214 OFFICE O/P EST MOD 30 MIN: CPT

## 2023-08-17 NOTE — HISTORY OF PRESENT ILLNESS
[N] : Patient does not use contraception [Y] : Patient is sexually active [FreeTextEntry1] : 43-year-old G2, P2 last menstrual cycle was July 27, 2023 presents for GYN evaluation.  She is desirous of pregnancy.  Partner has no children and he is yet to do the semen analysis.  She is ovulating based on the ovulatory kit [PGHxTotal] : 2 [[facility]] : 2 [PGHxPremature] : 0 [PGHxAbortions] : 0 [[facility]Living] : 2 [PGHxABInduced] : 0 [PGHxABSpont] : 0 [PGHxMultBirths] : 0 [PGHxEctopic] : 0

## 2023-08-17 NOTE — PHYSICAL EXAM
[Chaperone Present] : A chaperone was present in the examining room during all aspects of the physical examination [FreeTextEntry1] : Noé [Appropriately responsive] : appropriately responsive [Alert] : alert [No Acute Distress] : no acute distress [No Lymphadenopathy] : no lymphadenopathy [Regular Rate Rhythm] : regular rate rhythm [No Murmurs] : no murmurs [Clear to Auscultation B/L] : clear to auscultation bilaterally [Soft] : soft [Non-tender] : non-tender [Non-distended] : non-distended [No HSM] : No HSM [No Lesions] : no lesions [No Mass] : no mass [Oriented x3] : oriented x3 [Examination Of The Breasts] : a normal appearance [No Masses] : no breast masses were palpable [Labia Majora] : normal [Labia Minora] : normal [Normal] : normal [Uterine Adnexae] : normal

## 2023-08-17 NOTE — DISCUSSION/SUMMARY
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  Her hematology oncologist cleared her for pregnancy.  Pap GC chlamydia sent and hysterosalpingogram ordered.  Partner going for semen analysis.  Return in 4 weeks for follow-up.

## 2023-08-18 LAB
BILIRUB UR QL STRIP: NORMAL
C TRACH RRNA SPEC QL NAA+PROBE: NOT DETECTED
GLUCOSE UR-MCNC: NORMAL
HCG UR QL: 0.2 EU/DL
HGB UR QL STRIP.AUTO: NORMAL
HPV HIGH+LOW RISK DNA PNL CVX: NOT DETECTED
KETONES UR-MCNC: NORMAL
LEUKOCYTE ESTERASE UR QL STRIP: NORMAL
N GONORRHOEA RRNA SPEC QL NAA+PROBE: NOT DETECTED
NITRITE UR QL STRIP: NORMAL
PH UR STRIP: 7
PROT UR STRIP-MCNC: NORMAL
SOURCE TP AMPLIFICATION: NORMAL
SP GR UR STRIP: 1.01

## 2023-08-23 LAB — CYTOLOGY CVX/VAG DOC THIN PREP: NORMAL

## 2023-09-10 ENCOUNTER — EMERGENCY (EMERGENCY)
Facility: HOSPITAL | Age: 43
LOS: 1 days | Discharge: HOME | End: 2023-09-10
Attending: EMERGENCY MEDICINE
Payer: COMMERCIAL

## 2023-09-10 VITALS
TEMPERATURE: 99 F | HEIGHT: 64 IN | WEIGHT: 139.99 LBS | SYSTOLIC BLOOD PRESSURE: 116 MMHG | HEART RATE: 81 BPM | RESPIRATION RATE: 18 BRPM | DIASTOLIC BLOOD PRESSURE: 78 MMHG

## 2023-09-10 DIAGNOSIS — Z98.890 OTHER SPECIFIED POSTPROCEDURAL STATES: Chronic | ICD-10-CM

## 2023-09-10 LAB
ALBUMIN SERPL ELPH-MCNC: 4.2 G/DL — SIGNIFICANT CHANGE UP (ref 3.3–5.2)
ALP SERPL-CCNC: 55 U/L — SIGNIFICANT CHANGE UP (ref 40–120)
ALT FLD-CCNC: 13 U/L — SIGNIFICANT CHANGE UP
ANION GAP SERPL CALC-SCNC: 10 MMOL/L — SIGNIFICANT CHANGE UP (ref 5–17)
AST SERPL-CCNC: 18 U/L — SIGNIFICANT CHANGE UP
BASOPHILS # BLD AUTO: 0.05 K/UL — SIGNIFICANT CHANGE UP (ref 0–0.2)
BASOPHILS NFR BLD AUTO: 0.5 % — SIGNIFICANT CHANGE UP (ref 0–2)
BILIRUB SERPL-MCNC: 0.8 MG/DL — SIGNIFICANT CHANGE UP (ref 0.4–2)
BUN SERPL-MCNC: 12 MG/DL — SIGNIFICANT CHANGE UP (ref 8–20)
CALCIUM SERPL-MCNC: 8.6 MG/DL — SIGNIFICANT CHANGE UP (ref 8.4–10.5)
CHLORIDE SERPL-SCNC: 103 MMOL/L — SIGNIFICANT CHANGE UP (ref 96–108)
CO2 SERPL-SCNC: 23 MMOL/L — SIGNIFICANT CHANGE UP (ref 22–29)
CREAT SERPL-MCNC: 0.62 MG/DL — SIGNIFICANT CHANGE UP (ref 0.5–1.3)
EGFR: 115 ML/MIN/1.73M2 — SIGNIFICANT CHANGE UP
EOSINOPHIL # BLD AUTO: 0.23 K/UL — SIGNIFICANT CHANGE UP (ref 0–0.5)
EOSINOPHIL NFR BLD AUTO: 2.3 % — SIGNIFICANT CHANGE UP (ref 0–6)
GLUCOSE SERPL-MCNC: 100 MG/DL — HIGH (ref 70–99)
HCG SERPL-ACNC: 123.4 MIU/ML — HIGH
HCT VFR BLD CALC: 38.7 % — SIGNIFICANT CHANGE UP (ref 34.5–45)
HGB BLD-MCNC: 13.6 G/DL — SIGNIFICANT CHANGE UP (ref 11.5–15.5)
IMM GRANULOCYTES NFR BLD AUTO: 0.3 % — SIGNIFICANT CHANGE UP (ref 0–0.9)
LACTATE BLDV-MCNC: 0.8 MMOL/L — SIGNIFICANT CHANGE UP (ref 0.5–2)
LYMPHOCYTES # BLD AUTO: 2.14 K/UL — SIGNIFICANT CHANGE UP (ref 1–3.3)
LYMPHOCYTES # BLD AUTO: 21.3 % — SIGNIFICANT CHANGE UP (ref 13–44)
MCHC RBC-ENTMCNC: 32.1 PG — SIGNIFICANT CHANGE UP (ref 27–34)
MCHC RBC-ENTMCNC: 35.1 GM/DL — SIGNIFICANT CHANGE UP (ref 32–36)
MCV RBC AUTO: 91.3 FL — SIGNIFICANT CHANGE UP (ref 80–100)
MONOCYTES # BLD AUTO: 0.54 K/UL — SIGNIFICANT CHANGE UP (ref 0–0.9)
MONOCYTES NFR BLD AUTO: 5.4 % — SIGNIFICANT CHANGE UP (ref 2–14)
NEUTROPHILS # BLD AUTO: 7.08 K/UL — SIGNIFICANT CHANGE UP (ref 1.8–7.4)
NEUTROPHILS NFR BLD AUTO: 70.2 % — SIGNIFICANT CHANGE UP (ref 43–77)
PLATELET # BLD AUTO: 223 K/UL — SIGNIFICANT CHANGE UP (ref 150–400)
POTASSIUM SERPL-MCNC: 4.8 MMOL/L — SIGNIFICANT CHANGE UP (ref 3.5–5.3)
POTASSIUM SERPL-SCNC: 4.8 MMOL/L — SIGNIFICANT CHANGE UP (ref 3.5–5.3)
PROT SERPL-MCNC: 6.9 G/DL — SIGNIFICANT CHANGE UP (ref 6.6–8.7)
RBC # BLD: 4.24 M/UL — SIGNIFICANT CHANGE UP (ref 3.8–5.2)
RBC # FLD: 12.5 % — SIGNIFICANT CHANGE UP (ref 10.3–14.5)
SODIUM SERPL-SCNC: 136 MMOL/L — SIGNIFICANT CHANGE UP (ref 135–145)
WBC # BLD: 10.07 K/UL — SIGNIFICANT CHANGE UP (ref 3.8–10.5)
WBC # FLD AUTO: 10.07 K/UL — SIGNIFICANT CHANGE UP (ref 3.8–10.5)

## 2023-09-10 PROCEDURE — 96374 THER/PROPH/DIAG INJ IV PUSH: CPT

## 2023-09-10 PROCEDURE — 99284 EMERGENCY DEPT VISIT MOD MDM: CPT

## 2023-09-10 PROCEDURE — 99284 EMERGENCY DEPT VISIT MOD MDM: CPT | Mod: 25

## 2023-09-10 RX ORDER — ACETAMINOPHEN 500 MG
1000 TABLET ORAL ONCE
Refills: 0 | Status: COMPLETED | OUTPATIENT
Start: 2023-09-10 | End: 2023-09-10

## 2023-09-10 RX ORDER — SODIUM CHLORIDE 9 MG/ML
1000 INJECTION INTRAMUSCULAR; INTRAVENOUS; SUBCUTANEOUS ONCE
Refills: 0 | Status: COMPLETED | OUTPATIENT
Start: 2023-09-10 | End: 2023-09-10

## 2023-09-10 RX ADMIN — Medication 400 MILLIGRAM(S): at 09:43

## 2023-09-10 RX ADMIN — SODIUM CHLORIDE 1000 MILLILITER(S): 9 INJECTION INTRAMUSCULAR; INTRAVENOUS; SUBCUTANEOUS at 09:41

## 2023-09-10 NOTE — ED ADULT NURSE NOTE - OBJECTIVE STATEMENT
Assumed pt care at 0930 in [facility].  Pt reports right side upper toothache with swelling of right side of jaw/face.

## 2023-09-10 NOTE — ED PROVIDER NOTE - ENMT, MLM
Airway patent, Nasal mucosa clear. Mouth with normal mucosa. Throat has no vesicles, no oropharyngeal exudates and uvula is midline. rt facial swelling in maxillary area with lower jaw swelling and appears to be abscess

## 2023-09-10 NOTE — ED PROVIDER NOTE - CLINICAL SUMMARY MEDICAL DECISION MAKING FREE TEXT BOX
Patient has significant right maxillary swelling despite taking amoxicillin likely tooth abscess given history of lymphoma and potential pregnancy we will check labs to rule out pregnancy if not pregnant will consider CT max face with IV contrast and reassess

## 2023-09-10 NOTE — ED PROVIDER NOTE - OBJECTIVE STATEMENT
43-year-old female with history of lymphoma under observation but not on any treatment in presents with right facial swelling for 2 days.  Patient took amoxicillin without relief.  Patient is supposed to have right-sided root canal done.  Patient has seen dentist 1 month ago.  Last menstrual period was June 27 and unsure if she is pregnant.

## 2023-09-10 NOTE — ED PROVIDER NOTE - NSFOLLOWUPINSTRUCTIONS_ED_ALL_ED_FT
congratulations!   please follow with OBGYN and Primary Care Doctor   please only take tylenol for pain control every 6- 8 hours   warm salt water gargles.   please follow with DENTAL ASAP   new or worsening symptoms return to the ED           Dental Pain    Dental pain (toothache) may be caused by many things including tooth decay (cavities or caries), abscess or infection, or trauma. If you were prescribed an antibiotic medicine, finish all of it even if you start to feel better. Rinsing your mouth with salt water or applying ice to the painful area of your face may help with the pain. Follow up with a dentist is important in ensuring good oral health and preventing the worsening of dental disease.    SEEK IMMEDIATE MEDICAL CARE IF YOU HAVE ANY OF THE FOLLOWING SYMPTOMS: unable to open your mouth, trouble breathing or swallowing, fever, or swelling of the face, neck, or jaw.

## 2023-09-10 NOTE — ED PROVIDER NOTE - PROGRESS NOTE DETAILS
I performed the initial face to face bedside interview with this patient regarding history of present illness, review of symptoms and past medical, social and family history.  I completed an independent physical examination.  I was the initial provider who evaluated this patient.  The history, review of symptoms and examination was documented by the scribe in my presence and I attest to the accuracy of the documentation.  I have signed out the follow up of any pending tests (i.e. labs, radiological studies) to the PA.  I have discussed the patient’s plan of care and disposition with the PA. ENDY HO: PT evaluated by intake physician. HPI/PE/ROS as noted above. Will follow up plan per intake physician   + HCG, LMP july 27th, + swelling to the right superior gum line without induration, poor appearing molar. tongue and uvula midline. no pooling secretions.   advised for pt to fu with GYN and with DENTAL, OMFS referral given   advised to continue amoxicillin and tylenol only for pain control.

## 2023-09-10 NOTE — ED PROVIDER NOTE - CARE PROVIDER_API CALL
Ernesto Torres  Oral/Maxillofacial Surgery  [address] [place] [address], [address]  [place] [address]  Phone: [phone]  Fax: [phone]  Follow Up Time: Urgent

## 2023-09-10 NOTE — ED PROVIDER NOTE - PATIENT PORTAL LINK FT
You can access the FollowMyHealth Patient Portal offered by [facility] by registering at the following website: [email or web address]. By joining [employer]’s FollowMyHealth portal, you will also be able to view your health information using other applications (apps) compatible with our system.

## 2023-09-11 ENCOUNTER — APPOINTMENT (OUTPATIENT)
Dept: OBGYN | Facility: CLINIC | Age: 43
End: 2023-09-11
Payer: COMMERCIAL

## 2023-09-11 ENCOUNTER — NON-APPOINTMENT (OUTPATIENT)
Age: 43
End: 2023-09-11

## 2023-09-11 VITALS
SYSTOLIC BLOOD PRESSURE: 130 MMHG | OXYGEN SATURATION: 99 % | WEIGHT: 139.99 LBS | HEIGHT: 64 IN | DIASTOLIC BLOOD PRESSURE: 82 MMHG | HEART RATE: 85 BPM | RESPIRATION RATE: 18 BRPM | TEMPERATURE: 99 F

## 2023-09-11 VITALS
HEIGHT: 60 IN | DIASTOLIC BLOOD PRESSURE: 78 MMHG | BODY MASS INDEX: 28.47 KG/M2 | SYSTOLIC BLOOD PRESSURE: 117 MMHG | WEIGHT: 145 LBS

## 2023-09-11 PROCEDURE — 99214 OFFICE O/P EST MOD 30 MIN: CPT

## 2023-09-11 PROCEDURE — 83605 ASSAY OF LACTIC ACID: CPT

## 2023-09-11 PROCEDURE — 85025 COMPLETE CBC W/AUTO DIFF WBC: CPT

## 2023-09-11 PROCEDURE — 80053 COMPREHEN METABOLIC PANEL: CPT

## 2023-09-11 PROCEDURE — 99283 EMERGENCY DEPT VISIT LOW MDM: CPT

## 2023-09-11 PROCEDURE — 84702 CHORIONIC GONADOTROPIN TEST: CPT

## 2023-09-11 PROCEDURE — 36415 COLL VENOUS BLD VENIPUNCTURE: CPT

## 2023-09-11 RX ORDER — OXYCODONE HYDROCHLORIDE 5 MG/1
1 TABLET ORAL
Qty: 2 | Refills: 0
Start: 2023-09-11

## 2023-09-11 RX ORDER — PRENATAL VIT NO.130/IRON/FOLIC 27MG-0.8MG
28-0.8 TABLET ORAL DAILY
Qty: 90 | Refills: 3 | Status: ACTIVE | COMMUNITY
Start: 2023-09-11 | End: 1900-01-01

## 2023-09-11 RX ADMIN — Medication 1 TABLET(S): at 02:01

## 2023-09-11 NOTE — ED ADULT NURSE NOTE - NSFALLUNIVINTERV_ED_ALL_ED
Bed/Stretcher in lowest position, wheels locked, appropriate side rails in place/Call bell, personal items and telephone in reach/Instruct patient to call for assistance before getting out of bed/chair/stretcher/Non-slip footwear applied when patient is off stretcher/[place] to call system/Physically safe environment - no spills, clutter or unnecessary equipment/Purposeful proactive rounding/Room/bathroom lighting operational, light cord in reach

## 2023-09-11 NOTE — ED ADULT NURSE REASSESSMENT NOTE - NS ED NURSE REASSESS COMMENT FT1
pt has a penicillin allergy but has been taking amoxicillin x 3 days. pt said it is ok to have medication.

## 2023-09-11 NOTE — ED PROVIDER NOTE - OBJECTIVE STATEMENT
44 yo F hx lymphoma being observed, approx 3 weeks pregnant presents c/o R facial swelling and  R upper dental pain. Pt reports sx started a few days ago and was prescribed amoxicillin 500mg TID she has been taking. was seen here in the morning for continued sx , found out pregnant, advised to continue abx and tylenol prn, advised OMFS f/u. pt returns to ER for worsening pain, feels that swelling is getting worse and had fever 100.3F at home. last tylenol 2 hrs pta. denies n/v. notes supposed to have root canal done.

## 2023-09-11 NOTE — ED PROVIDER NOTE - PATIENT PORTAL LINK FT
You can access the FollowMyHealth Patient Portal offered by [facility] by registering at the following website: [email or web address]. By joining [employer]’s FollowMyHealth portal, you will also be able to view your health information using other applications (apps) compatible with our system.

## 2023-09-11 NOTE — ED ADULT NURSE NOTE - CHIEF COMPLAINT QUOTE
patient seen here [facility] ED earlier today for 2 days of right sided facial swelling due to right side molar infection. taking amoxicillin x 2 days and advised to take tylenol for pain. states now had fever at home so she came back tonight. patient states she is 3 weeks pregnant.

## 2023-09-11 NOTE — ED PROVIDER NOTE - CONSTITUTIONAL [+], MLM
Subjective   Ashok Vinson is a 24 y.o. male.     Chief Complaint   Patient presents with   • Hypertension       /64 (BP Location: Left arm, Patient Position: Sitting, Cuff Size: Large Adult)   Pulse 97   Temp 97.8 °F (36.6 °C) (Temporal)   Resp 18   Wt 109 kg (240 lb)   SpO2 99%   BMI 28.46 kg/m²     BP Readings from Last 3 Encounters:   07/17/20 154/64   01/24/20 132/54   11/25/19 156/58       Wt Readings from Last 3 Encounters:   07/17/20 109 kg (240 lb)   01/24/20 110 kg (243 lb)   11/25/19 109 kg (241 lb)       HPI patient presents to the clinic with complaints of intermittent nosebleeds. The nosebleeds are present every 1-2 days and last for minutes. The nosebleeds go away with pressure. He denies any trauma to the nose. He does have a history of occasional mild allergies but nothing new or out of the ordinary. He is not on any blood thinning medication. He does have a history of elevated blood pressures and has undergone a workup for secondary htn. He is currently on 5mg of norvasc and 40mg of lisinopril. He continues to have elevated blood pressures despite treatment.     The following portions of the patient's history were reviewed and updated as appropriate: allergies, current medications, past family history, past medical history, past social history, past surgical history and problem list.    Review of Systems   Constitutional: Negative for activity change, appetite change and fatigue.   HENT: Positive for nosebleeds. Negative for congestion, rhinorrhea and sore throat.    Eyes: Negative for blurred vision, pain and visual disturbance.   Respiratory: Negative for cough, chest tightness and shortness of breath.    Cardiovascular: Negative for chest pain and leg swelling.   Gastrointestinal: Negative for abdominal pain, blood in stool and nausea.   Endocrine: Negative for polydipsia and polyuria.   Genitourinary: Negative for dysuria and urgency.   Musculoskeletal: Negative for arthralgias  and back pain.   Skin: Negative for rash and bruise.   Allergic/Immunologic: Negative for environmental allergies.   Neurological: Negative for headache and confusion.   Hematological: Negative for adenopathy. Does not bruise/bleed easily.   Psychiatric/Behavioral: Negative for stress. The patient is not nervous/anxious.        Objective   Physical Exam   Constitutional: He is oriented to person, place, and time. He appears well-developed and well-nourished.   HENT:   Head: Normocephalic and atraumatic.   Eyes: Pupils are equal, round, and reactive to light. Conjunctivae and EOM are normal.   Neck: Normal range of motion. Neck supple.   Cardiovascular: Normal rate and regular rhythm.   No murmur heard.  Pulmonary/Chest: Effort normal and breath sounds normal.   Abdominal: Soft. Bowel sounds are normal. There is no tenderness.   Musculoskeletal: Normal range of motion. He exhibits no deformity.   Lymphadenopathy:     He has no cervical adenopathy.   Neurological: He is alert and oriented to person, place, and time. No cranial nerve deficit.   Skin: Skin is warm and dry. Capillary refill takes less than 2 seconds. No rash noted.   Psychiatric: He has a normal mood and affect. His behavior is normal. Judgment and thought content normal.         Diagnoses and all orders for this visit:    1. Hypertension, unspecified type (Primary)  -     Renin Activity & Aldosterone; Future  -     TSH; Future  -     Basic metabolic panel; Future    Other orders  -     amLODIPine (Norvasc) 10 MG tablet; Take 1 tablet by mouth Daily.  Dispense: 90 tablet; Refill: 3    we will see him back in one week and at that time we will consider a beta blocker. He is to come in between 8-9 in the morning for renin and aldosterone levels. He is to wake up at 6 am to start ambulation prior to the blood draw. He is to remain seated between 5-15 minutes prior to the blood draw. He will also likely benefit from a 24 hour urine at some point. I will also  check a repeat bmp to evaluate his potassium level and calcium level when he has his blood drawn as well as a TSH.     Return in about 1 week (around 7/24/2020).   FEVER

## 2023-09-11 NOTE — ED PROVIDER NOTE - CLINICAL SUMMARY MEDICAL DECISION MAKING FREE TEXT BOX
42 yo F 3 weeks pregnant c/o worsened pain R upper dental region, worsened R facial swelling and now fever despite 3 days of taking amoxicillin 500mg tid. took tylenol pta and temp downtrending. most likely dental infection/abscess, possible salivary gland infection? will change abx to augmentin and f/u dentist. given her significant pain discussed risk benefit oxycodone in pregnancy pt accepts risk , given dose in ED with improvement in pain and dced with a few additional tablets if needed for severe pain. pt agreeable w plan, understood return precautions

## 2023-09-11 NOTE — ED ADULT TRIAGE NOTE - CHIEF COMPLAINT QUOTE
patient seen here [facility] ED earlier today for 2 days of right sided facial swelling due to right side molar infection. taking amoxicillin x 2 days and advised to take tylenol for pain. states now had fever at home so she came back tonight. patient states she is 3 weeks pregnant.

## 2023-09-11 NOTE — ED ADULT NURSE NOTE - OBJECTIVE STATEMENT
42 yo F hx lymphoma being observed, approx 3 weeks pregnant presents c/o R facial swelling and  R upper dental pain. Pt reports sx started a few days ago and was prescribed amoxicillin 500mg TID she has been taking. was seen here in the morning for continued sx , found out pregnant, advised to continue abx and tylenol prn, advised OMFS f/u. pt returns to ER for worsening pain, feels that swelling is getting worse and had fever 100.3F at home. last tylenol 2 hrs pta. denies n/v. notes supposed to have root canal done.

## 2023-09-11 NOTE — ED PROVIDER NOTE - PHYSICAL EXAMINATION
Gen: mild distress, non toxic  HEENT: Mucous membranes moist, pink conjunctivae, EOMI. R maxillary facial swelling, likely abscess, poor dentition  CV: RRR, nl s1/s2.  Resp: CTAB, normal rate and effort  GI: Abdomen soft, NT, ND. No rebound, no guarding  : No CVAT  Neuro: A&O x 3, moving all 4 extremities  MSK: No spine or joint tenderness to palpation  Skin: No rashes. intact and perfused.

## 2023-09-11 NOTE — ED PROVIDER NOTE - NSFOLLOWUPINSTRUCTIONS_ED_ALL_ED_FT
Please take antibiotics as prescribed  Take tylenol 650mg every 6 hours as needed for pain  Take oxycodone if needed for severe pain  Follow up with primary care doctor and dentist   Return to ER for fevers, worsening pain/swelling, vomiting, or other new or worsening symptoms

## 2023-09-13 NOTE — ED PROVIDER NOTE - RESPIRATORY, MLM
Last Seen:8/31/23    Last Refilled:7/14/23    Next Appointment:3/07/24    No protocol, will fwd to clinician for review.     Breath sounds clear and equal bilaterally.

## 2023-09-15 ENCOUNTER — APPOINTMENT (OUTPATIENT)
Age: 43
End: 2023-09-15

## 2023-09-15 ENCOUNTER — APPOINTMENT (OUTPATIENT)
Dept: OBGYN | Facility: CLINIC | Age: 43
End: 2023-09-15
Payer: COMMERCIAL

## 2023-09-15 VITALS
SYSTOLIC BLOOD PRESSURE: 120 MMHG | WEIGHT: 147.38 LBS | DIASTOLIC BLOOD PRESSURE: 82 MMHG | BODY MASS INDEX: 28.93 KG/M2 | HEIGHT: 60 IN

## 2023-09-15 DIAGNOSIS — N91.1 SECONDARY AMENORRHEA: ICD-10-CM

## 2023-09-15 DIAGNOSIS — K08.89 OTHER SPECIFIED DISORDERS OF TEETH AND SUPPORTING STRUCTURES: ICD-10-CM

## 2023-09-15 LAB
HCG UR QL: POSITIVE
QUALITY CONTROL: YES

## 2023-09-15 PROCEDURE — 99213 OFFICE O/P EST LOW 20 MIN: CPT

## 2023-09-16 ENCOUNTER — APPOINTMENT (OUTPATIENT)
Dept: MAMMOGRAPHY | Facility: CLINIC | Age: 43
End: 2023-09-16

## 2023-09-17 ENCOUNTER — EMERGENCY (EMERGENCY)
Facility: HOSPITAL | Age: 43
LOS: 1 days | Discharge: HOME | End: 2023-09-17
Attending: EMERGENCY MEDICINE
Payer: COMMERCIAL

## 2023-09-17 VITALS
HEIGHT: 64 IN | RESPIRATION RATE: 18 BRPM | TEMPERATURE: 99 F | HEART RATE: 67 BPM | OXYGEN SATURATION: 97 % | SYSTOLIC BLOOD PRESSURE: 138 MMHG | DIASTOLIC BLOOD PRESSURE: 92 MMHG | WEIGHT: 145.73 LBS

## 2023-09-17 DIAGNOSIS — Z98.890 OTHER SPECIFIED POSTPROCEDURAL STATES: Chronic | ICD-10-CM

## 2023-09-17 LAB
ALBUMIN SERPL ELPH-MCNC: 4.1 G/DL — SIGNIFICANT CHANGE UP (ref 3.3–5.2)
ALP SERPL-CCNC: 52 U/L — SIGNIFICANT CHANGE UP (ref 40–120)
ALT FLD-CCNC: 18 U/L — SIGNIFICANT CHANGE UP
ANION GAP SERPL CALC-SCNC: 12 MMOL/L — SIGNIFICANT CHANGE UP (ref 5–17)
APPEARANCE UR: CLEAR — SIGNIFICANT CHANGE UP
AST SERPL-CCNC: 31 U/L — SIGNIFICANT CHANGE UP
BASOPHILS # BLD AUTO: 0.07 K/UL — SIGNIFICANT CHANGE UP (ref 0–0.2)
BASOPHILS NFR BLD AUTO: 0.7 % — SIGNIFICANT CHANGE UP (ref 0–2)
BILIRUB SERPL-MCNC: 0.3 MG/DL — LOW (ref 0.4–2)
BILIRUB UR-MCNC: NEGATIVE — SIGNIFICANT CHANGE UP
BUN SERPL-MCNC: 12 MG/DL — SIGNIFICANT CHANGE UP (ref 8–20)
CALCIUM SERPL-MCNC: 8.6 MG/DL — SIGNIFICANT CHANGE UP (ref 8.4–10.5)
CHLORIDE SERPL-SCNC: 101 MMOL/L — SIGNIFICANT CHANGE UP (ref 96–108)
CO2 SERPL-SCNC: 23 MMOL/L — SIGNIFICANT CHANGE UP (ref 22–29)
COLOR SPEC: YELLOW — SIGNIFICANT CHANGE UP
CREAT SERPL-MCNC: 0.54 MG/DL — SIGNIFICANT CHANGE UP (ref 0.5–1.3)
DIFF PNL FLD: ABNORMAL
EGFR: 119 ML/MIN/1.73M2 — SIGNIFICANT CHANGE UP
EOSINOPHIL # BLD AUTO: 0.28 K/UL — SIGNIFICANT CHANGE UP (ref 0–0.5)
EOSINOPHIL NFR BLD AUTO: 2.8 % — SIGNIFICANT CHANGE UP (ref 0–6)
EPI CELLS # UR: SIGNIFICANT CHANGE UP
GLUCOSE SERPL-MCNC: 94 MG/DL — SIGNIFICANT CHANGE UP (ref 70–99)
GLUCOSE UR QL: NEGATIVE MG/DL — SIGNIFICANT CHANGE UP
HCG SERPL-ACNC: 93.6 MIU/ML — HIGH
HCT VFR BLD CALC: 34.8 % — SIGNIFICANT CHANGE UP (ref 34.5–45)
HGB BLD-MCNC: 12.3 G/DL — SIGNIFICANT CHANGE UP (ref 11.5–15.5)
IMM GRANULOCYTES NFR BLD AUTO: 0.2 % — SIGNIFICANT CHANGE UP (ref 0–0.9)
KETONES UR-MCNC: NEGATIVE — SIGNIFICANT CHANGE UP
LEUKOCYTE ESTERASE UR-ACNC: NEGATIVE — SIGNIFICANT CHANGE UP
LYMPHOCYTES # BLD AUTO: 4.45 K/UL — HIGH (ref 1–3.3)
LYMPHOCYTES # BLD AUTO: 44.4 % — HIGH (ref 13–44)
MCHC RBC-ENTMCNC: 32.5 PG — SIGNIFICANT CHANGE UP (ref 27–34)
MCHC RBC-ENTMCNC: 35.3 GM/DL — SIGNIFICANT CHANGE UP (ref 32–36)
MCV RBC AUTO: 91.8 FL — SIGNIFICANT CHANGE UP (ref 80–100)
MONOCYTES # BLD AUTO: 0.56 K/UL — SIGNIFICANT CHANGE UP (ref 0–0.9)
MONOCYTES NFR BLD AUTO: 5.6 % — SIGNIFICANT CHANGE UP (ref 2–14)
NEUTROPHILS # BLD AUTO: 4.64 K/UL — SIGNIFICANT CHANGE UP (ref 1.8–7.4)
NEUTROPHILS NFR BLD AUTO: 46.3 % — SIGNIFICANT CHANGE UP (ref 43–77)
NITRITE UR-MCNC: NEGATIVE — SIGNIFICANT CHANGE UP
PH UR: 7 — SIGNIFICANT CHANGE UP (ref 5–8)
PLATELET # BLD AUTO: 250 K/UL — SIGNIFICANT CHANGE UP (ref 150–400)
POTASSIUM SERPL-MCNC: 3.9 MMOL/L — SIGNIFICANT CHANGE UP (ref 3.5–5.3)
POTASSIUM SERPL-SCNC: 3.9 MMOL/L — SIGNIFICANT CHANGE UP (ref 3.5–5.3)
PROT SERPL-MCNC: 6.6 G/DL — SIGNIFICANT CHANGE UP (ref 6.6–8.7)
PROT UR-MCNC: NEGATIVE — SIGNIFICANT CHANGE UP
RBC # BLD: 3.79 M/UL — LOW (ref 3.8–5.2)
RBC # FLD: 12.2 % — SIGNIFICANT CHANGE UP (ref 10.3–14.5)
RBC CASTS # UR COMP ASSIST: ABNORMAL /HPF (ref 0–4)
SODIUM SERPL-SCNC: 136 MMOL/L — SIGNIFICANT CHANGE UP (ref 135–145)
SP GR SPEC: 1 — LOW (ref 1.01–1.02)
UROBILINOGEN FLD QL: NEGATIVE MG/DL — SIGNIFICANT CHANGE UP
WBC # BLD: 10.02 K/UL — SIGNIFICANT CHANGE UP (ref 3.8–10.5)
WBC # FLD AUTO: 10.02 K/UL — SIGNIFICANT CHANGE UP (ref 3.8–10.5)
WBC UR QL: NEGATIVE /HPF — SIGNIFICANT CHANGE UP (ref 0–5)

## 2023-09-17 PROCEDURE — 99284 EMERGENCY DEPT VISIT MOD MDM: CPT

## 2023-09-17 RX ORDER — ACETAMINOPHEN 500 MG
975 TABLET ORAL ONCE
Refills: 0 | Status: COMPLETED | OUTPATIENT
Start: 2023-09-17 | End: 2023-09-17

## 2023-09-17 RX ADMIN — Medication 975 MILLIGRAM(S): at 22:15

## 2023-09-17 NOTE — ED PROVIDER NOTE - OBJECTIVE STATEMENT
42 yo female PMHx HTN, LMP: 7/27 presents to ED c/o vaginal bleeding. Reports wiped tonight and had red blood. +Lower abdominal cramping. Has not had confirmed IUP yet. Appt. with OBGYN scheduled for Friday. No further complaints at this time.

## 2023-09-17 NOTE — ED ADULT TRIAGE NOTE - GLASGOW COMA SCALE: BEST VERBAL RESPONSE, MLM
Notified that patient has thick white cheesy vaginal discharge.  No other symptoms.  OK for Diflucan 150 mg X1.  
(V5) oriented

## 2023-09-17 NOTE — ED PROVIDER NOTE - PATIENT PORTAL LINK FT
You can access the FollowMyHealth Patient Portal offered by [facility] by registering at the following website: [email or web address]. By joining [employer]’s FollowMyHealth portal, you will also be able to view your health information using other applications (apps) compatible with our system.

## 2023-09-17 NOTE — ED PROVIDER NOTE - PHYSICAL EXAMINATION
Gen: Nontoxic, well appearing, in NAD.  Skin: Warm and dry as visualized.  Head: NC/AT.  Eyes: PERRLA. EOMI.  Neck: Supple, FROM. Trachea midline.   Resp: No distress.  Cardio: Well perfused.  Abd: Nondistended. Soft, nontender. No CVAT. No guarding.   Ext: No deformities. MAEx4. FROM.   Neuro: A&Ox3. Appears nonfocal.   Psych: Normal affect and mood.

## 2023-09-17 NOTE — ED PROVIDER NOTE - ATTENDING APP SHARED VISIT CONTRIBUTION OF CARE
I, Nura Pelaez, have personally performed a face to face diagnostic evaluation on this patient. I have reviewed the ROSHAN note and agree with the history, exam and plan of care, except as noted.     43-year-old female approximately 7 weeks pregnant presents with left lower quadrant pain and vaginal spotting.   Patient has not follow-up with GYN yet. hemoglobin 12, hCG 93.  Ultrasound unable to visualize IUP.  Patient will need to follow-up with OB for repeat hCG and ultrasound.

## 2023-09-17 NOTE — ED PROVIDER NOTE - NSFOLLOWUPINSTRUCTIONS_ED_ALL_ED_FT
- Acetaminophen 650mg every 6 hours as needed for pain.   - Please call your OBGYN today and inform on ED visit.   - Please bring all documentation from your ED visit to any related future follow up appointment.  - Please call to schedule follow up appointment with your primary care physician within 24-48 hours.  - Please seek immediate medical attention or return to the ED for any new/worsening, signs/symptoms, or concerns.        Vaginal Bleeding During Pregnancy, First Trimester       A small amount of bleeding from the vagina (spotting) is relatively common during early pregnancy. It usually stops on its own. Various things may cause bleeding or spotting during early pregnancy. Some bleeding may be related to the pregnancy, and some may not. In many cases, the bleeding is normal and is not a problem. However, bleeding can also be a sign of something serious. Be sure to tell your health care provider about any vaginal bleeding right away.    Some possible causes of vaginal bleeding during the first trimester include:    Infection or inflammation of the cervix.  Growths (polyps) on the cervix.  Miscarriage or threatened miscarriage.  Pregnancy tissue developing outside of the uterus (ectopic pregnancy).  A mass of tissue developing in the uterus due to an egg being fertilized incorrectly (molar pregnancy).    Follow these instructions at home:      Activity    Follow instructions from your health care provider about limiting your activity. Ask what activities are safe for you.  If needed, make plans for someone to help with your regular activities.  Do not have sex or orgasms until your health care provider says that this is safe.        General instructions    Take over-the-counter and prescription medicines only as told by your health care provider.  Pay attention to any changes in your symptoms.  Do not use tampons or douche.  Write down how many pads you use each day, how often you change pads, and how soaked (saturated) they are.  If you pass any tissue from your vagina, save the tissue so you can show it to your health care provider.  Keep all follow-up visits as told by your health care provider. This is important.    Contact a health care provider if:  You have vaginal bleeding during any part of your pregnancy.  You have cramps or labor pains.  You have a fever.    Get help right away if:  You have severe cramps in your back or abdomen.  You pass large clots or a large amount of tissue from your vagina.  Your bleeding increases.  You feel light-headed or weak, or you faint.  You have chills.  You are leaking fluid or have a gush of fluid from your vagina.    Summary  A small amount of bleeding (spotting) from the vagina is relatively common during early pregnancy.  Various things may cause bleeding or spotting in early pregnancy.  Be sure to tell your health care provider about any vaginal bleeding right away.    ADDITIONAL NOTES AND INSTRUCTIONS    Please follow up with your Primary MD in 24-48 hr.  Seek immediate medical care for any new/worsening signs or symptoms.

## 2023-09-17 NOTE — ED PROVIDER NOTE - CLINICAL SUMMARY MEDICAL DECISION MAKING FREE TEXT BOX
42 yo female PMHx HTN, LMP: 7/27 presents to ED c/o vaginal bleeding. Beta from 123 to now 93. No IUP on US. Has appt. with OB Friday. Likely miscarrying. Results and clinical concern discussed with patient. Medically stable for discharge.

## 2023-09-17 NOTE — ED ADULT NURSE NOTE - NSFALLRISKFACTORS_ED_ALL_ED
START Levaquin ONCE DAILY x 10 DAYS 
 
START prednisone TWICE DAILY x 5 DAYS Can use Zofran as directed, for nausea or vomiting Use Albuterol 3-4 times daily for the next 5 days, for wheeze, chest tightness Sinusitis in Teens: Care Instructions Your Care Instructions Sinusitis is an infection of the lining of the sinus cavities in your head. Sinusitis often follows a cold. It causes pain and pressure in your head and face. In most cases, sinusitis gets better on its own in 1 to 2 weeks. But some mild symptoms may last for several weeks. Sometimes antibiotics are needed. Follow-up care is a key part of your treatment and safety. Be sure to make and go to all appointments, and call your doctor if you are having problems. It's also a good idea to know your test results and keep a list of the medicines you take. How can you care for yourself at home? · Take an over-the-counter pain medicine, such as acetaminophen (Tylenol), ibuprofen (Advil, Motrin), or naproxen (Aleve). Be safe with medicines. Read and follow all instructions on the label. No one younger than 20 should take aspirin. It has been linked to Reye syndrome, a serious illness. · If the doctor prescribed antibiotics, take them as directed. Do not stop taking them just because you feel better. You need to take the full course of antibiotics. · Be careful when taking over-the-counter cold or flu medicines and Tylenol at the same time. Many of these medicines have acetaminophen, which is Tylenol. Read the labels to make sure that you are not taking more than the recommended dose. Too much acetaminophen (Tylenol) can be harmful. · Use a nasal spray medicine that relieves a stuffy nose. Do not use the medicine longer than the label says. · Breathe warm, moist air from a steamy shower, a hot bath, or a sink filled with hot water. Avoid cold, dry air. Using a humidifier in your home may help. Follow the directions for cleaning the machine. · Use saline (saltwater) nasal washes to help keep your nasal passages open and wash out mucus and bacteria. You can buy saline nose drops at a grocery store or drugstore. Or you can make your own at home by adding 1 teaspoon of salt and 1 teaspoon of baking soda to 2 cups of distilled water. If you make your own, fill a bulb syringe with the solution, insert the tip into your nostril, and squeeze gently. Arnav Si your nose. · Put a hot, wet towel or a warm gel pack on your face 3 or 4 times a day for 5 to 10 minutes each time. When should you call for help? Call your doctor now or seek immediate medical care if: 
  · You have new or worse symptoms of infection, such as: 
? Increased pain, swelling, warmth, or redness. ? Red streaks leading from the area. ? Pus draining from the area. ? A fever.  
 Watch closely for changes in your health, and be sure to contact your doctor if: 
  · You are not getting better as expected. Where can you learn more? Go to http://jackie-carolynn.info/. Enter D067 in the search box to learn more about \"Sinusitis in Teens: Care Instructions. \" Current as of: October 21, 2018 Content Version: 12.2 © 3499-1914 [employer]. Care instructions adapted under license by [employer] (which disclaims liability or warranty for this information). If you have questions about a medical condition or this instruction, always ask your healthcare professional. [place] [address] any warranty or liability for your use of this information. Wheezing or Bronchoconstriction: Care Instructions Your Care Instructions Wheezing is a whistling noise made during breathing. It occurs when the small airways, or bronchial tubes, that lead to your lungs swell or contract (spasm) and become narrow. This narrowing is called bronchoconstriction.  When your airways constrict, it is hard for air to pass through and this makes it hard for you to breathe. Wheezing and bronchoconstriction can be caused by many problems, including: · An infection such as the flu or a cold. · Allergies such as hay fever. · Diseases such as asthma or chronic obstructive pulmonary disease. · Smoking. Treatment for your wheezing depends on what is causing the problem. Your wheezing may get better without treatment. But you may need to pay attention to things that cause your wheezing and avoid them. Or you may need medicine to help treat the wheezing and to reduce the swelling or to relieve spasms in your lungs. Follow-up care is a key part of your treatment and safety. Be sure to make and go to all appointments, and call your doctor if you are having problems. It is also a good idea to know your test results and keep a list of the medicines you take. How can you care for yourself at home? · Take your medicine exactly as prescribed. Call your doctor if you think you are having a problem with your medicine. You will get more details on the specific medicine your doctor prescribes. · If your doctor prescribed antibiotics, take them as directed. Do not stop taking them just because you feel better. You need to take the full course of antibiotics. · Breathe moist air from a humidifier, hot shower, or sink filled with hot water. This may help ease your symptoms and make it easier for you to breathe. · If you have congestion in your nose and throat, drinking plenty of fluids, especially hot fluids, may help relieve your symptoms. If you have kidney, heart, or liver disease and have to limit fluids, talk with your doctor before you increase the amount of fluids you drink. · If you have mucus in your airways, it may help to breathe deeply and cough. · Do not smoke or allow others to smoke around you. Smoking can make your wheezing worse.  If you need help quitting, talk to your doctor about stop-smoking programs and medicines. These can increase your chances of quitting for good. · Avoid things that may cause your wheezing. These may include colds, smoke, air pollution, dust, pollen, pets, cockroaches, stress, and cold air. When should you call for help? Call 911 anytime you think you may need emergency care. For example, call if: 
  · You have severe trouble breathing.  
  · You passed out (lost consciousness).  
 Call your doctor now or seek immediate medical care if: 
  · You cough up yellow, dark brown, or bloody mucus (sputum).  
  · You have new or worse shortness of breath.  
  · Your wheezing is not getting better or it gets worse after you start taking your medicine.  
 Watch closely for changes in your health, and be sure to contact your doctor if: 
  · You do not get better as expected. Where can you learn more? Go to http://jackie-carolynn.info/. Enter 822 3266 in the search box to learn more about \"Wheezing or Bronchoconstriction: Care Instructions. \" Current as of: June 9, 2019 Content Version: 12.2 © 1189-1094 [employer]. Care instructions adapted under license by [employer] (which disclaims liability or warranty for this information). If you have questions about a medical condition or this instruction, always ask your healthcare professional. [address] any warranty or liability for your use of this information. No indicators present

## 2023-09-17 NOTE — ED ADULT NURSE REASSESSMENT NOTE - NS ED NURSE REASSESS COMMENT FT1
patient upset about wati time, spoke with her with , called ultrasound, will take shortly after midnight, will bring down myself.  patientupdated about plan, in no distress, respirations even andnonlabored

## 2023-09-18 ENCOUNTER — APPOINTMENT (OUTPATIENT)
Dept: OBGYN | Facility: CLINIC | Age: 43
End: 2023-09-18
Payer: COMMERCIAL

## 2023-09-18 ENCOUNTER — APPOINTMENT (OUTPATIENT)
Dept: OBGYN | Facility: CLINIC | Age: 43
End: 2023-09-18

## 2023-09-18 VITALS
HEIGHT: 60 IN | SYSTOLIC BLOOD PRESSURE: 114 MMHG | BODY MASS INDEX: 28.27 KG/M2 | WEIGHT: 144 LBS | DIASTOLIC BLOOD PRESSURE: 70 MMHG

## 2023-09-18 DIAGNOSIS — O36.80X0 PREGNANCY WITH INCONCLUSIVE FETAL VIABILITY, NOT APPLICABLE OR UNSPECIFIED: ICD-10-CM

## 2023-09-18 LAB
BLD GP AB SCN SERPL QL: SIGNIFICANT CHANGE UP
HCG SERPL QL: POSITIVE
PAPP-A SERPL-ACNC: 100 MIU/ML

## 2023-09-18 PROCEDURE — 87086 URINE CULTURE/COLONY COUNT: CPT

## 2023-09-18 PROCEDURE — 99213 OFFICE O/P EST LOW 20 MIN: CPT

## 2023-09-18 PROCEDURE — 99284 EMERGENCY DEPT VISIT MOD MDM: CPT

## 2023-09-18 PROCEDURE — 36415 COLL VENOUS BLD VENIPUNCTURE: CPT

## 2023-09-18 PROCEDURE — 86901 BLOOD TYPING SEROLOGIC RH(D): CPT

## 2023-09-18 PROCEDURE — 81001 URINALYSIS AUTO W/SCOPE: CPT

## 2023-09-18 PROCEDURE — 76801 OB US < 14 WKS SINGLE FETUS: CPT

## 2023-09-18 PROCEDURE — 86850 RBC ANTIBODY SCREEN: CPT

## 2023-09-18 PROCEDURE — 76801 OB US < 14 WKS SINGLE FETUS: CPT | Mod: 26

## 2023-09-18 PROCEDURE — 85025 COMPLETE CBC W/AUTO DIFF WBC: CPT

## 2023-09-18 PROCEDURE — 84702 CHORIONIC GONADOTROPIN TEST: CPT

## 2023-09-18 PROCEDURE — 76817 TRANSVAGINAL US OBSTETRIC: CPT | Mod: 26

## 2023-09-18 PROCEDURE — 80053 COMPREHEN METABOLIC PANEL: CPT

## 2023-09-18 PROCEDURE — 86900 BLOOD TYPING SEROLOGIC ABO: CPT

## 2023-09-18 PROCEDURE — 76817 TRANSVAGINAL US OBSTETRIC: CPT

## 2023-09-20 ENCOUNTER — APPOINTMENT (OUTPATIENT)
Dept: CARDIOLOGY | Facility: CLINIC | Age: 43
End: 2023-09-20

## 2023-09-20 LAB
CULTURE RESULTS: NO GROWTH — SIGNIFICANT CHANGE UP
SPECIMEN SOURCE: SIGNIFICANT CHANGE UP

## 2023-09-22 ENCOUNTER — APPOINTMENT (OUTPATIENT)
Dept: OBGYN | Facility: CLINIC | Age: 43
End: 2023-09-22
Payer: COMMERCIAL

## 2023-09-22 VITALS
BODY MASS INDEX: 28.27 KG/M2 | DIASTOLIC BLOOD PRESSURE: 74 MMHG | WEIGHT: 144 LBS | SYSTOLIC BLOOD PRESSURE: 124 MMHG | HEIGHT: 60 IN

## 2023-09-22 PROBLEM — O36.80X0 PREGNANCY, LOCATION UNKNOWN: Status: ACTIVE | Noted: 2023-09-18

## 2023-09-22 PROCEDURE — 99213 OFFICE O/P EST LOW 20 MIN: CPT

## 2023-09-23 ENCOUNTER — APPOINTMENT (OUTPATIENT)
Dept: MAMMOGRAPHY | Facility: CLINIC | Age: 43
End: 2023-09-23

## 2023-09-25 LAB
ABO + RH PNL BLD: NORMAL
BLD GP AB SCN SERPL QL: NORMAL
HCG SERPL-MCNC: 4 MIU/ML
HCG UR QL: NEGATIVE
QUALITY CONTROL: YES

## 2023-09-27 ENCOUNTER — APPOINTMENT (OUTPATIENT)
Dept: OBGYN | Facility: CLINIC | Age: 43
End: 2023-09-27
Payer: COMMERCIAL

## 2023-09-27 VITALS
BODY MASS INDEX: 28.8 KG/M2 | HEIGHT: 60 IN | WEIGHT: 146.7 LBS | SYSTOLIC BLOOD PRESSURE: 120 MMHG | DIASTOLIC BLOOD PRESSURE: 84 MMHG

## 2023-09-27 DIAGNOSIS — O03.9 COMPLETE OR UNSPECIFIED SPONTANEOUS ABORTION W/OUT COMPLICATION: ICD-10-CM

## 2023-09-27 PROCEDURE — 99214 OFFICE O/P EST MOD 30 MIN: CPT

## 2023-09-27 NOTE — ASU PATIENT PROFILE, ADULT - FALL HARM RISK CONCLUSION
Subjective: \"I'm doing alright. I still have that headache. \"   Falls since last visit: (if yes complete the Fall Tracking Form and include bsrifallreport): No   Caregiver involvement changes: none   Home health supplies by type and quantity ordered/delivered this visit include: Theraband    Clinician asked if patient has had any physician contact since last home care visit and patient states: No   Clinician asked if patient has any new or changed medications and patient states:  No   If Yes, were medications reconciled? N/A  Was the certifying physician notified of changes in medications? N/A     Clinical assessment (what this visit means for the patient overall and need for ongoing skilled care) and progress or lack of progress towards SPECIFIC goals:  Patient continues to demonstrate decreased strength needed for increased independence and reducing risk of falls ADLs, IADLs and functional transfers requiring additional instruction from skilled [facility] OT to achieve higher level of functional independence and reduce falls as caregiver unable to provide level of needed assistance safely at this time. Patient making steady progress to independent level goal set for UE HEP using theraband. Written Teaching Material Utilized: UE HEP handout consisting of theraband exercises   Interdisciplinary communication with: Kristian Fay PT for POC colloboration   Discharge planning as follows: Discharge from [facility] OT when goals are met or maximum level of function is achieved.      Specific plan for next visit:  Focus on increasing independence and reducing risk of falls with toilet transfers and toileting tasks Universal Safety Interventions

## 2023-09-28 RX ORDER — LOSARTAN POTASSIUM 50 MG/1
50 TABLET, FILM COATED ORAL DAILY
Qty: 90 | Refills: 0 | Status: ACTIVE | COMMUNITY
Start: 1900-01-01 | End: 1900-01-01

## 2023-10-06 ENCOUNTER — APPOINTMENT (OUTPATIENT)
Dept: OBGYN | Facility: CLINIC | Age: 43
End: 2023-10-06

## 2023-10-21 ENCOUNTER — NON-APPOINTMENT (OUTPATIENT)
Age: 43
End: 2023-10-21

## 2023-11-18 ENCOUNTER — EMERGENCY (EMERGENCY)
Facility: HOSPITAL | Age: 43
LOS: 1 days | Discharge: HOME | End: 2023-11-18
Attending: EMERGENCY MEDICINE
Payer: COMMERCIAL

## 2023-11-18 VITALS
HEART RATE: 69 BPM | SYSTOLIC BLOOD PRESSURE: 151 MMHG | OXYGEN SATURATION: 98 % | RESPIRATION RATE: 20 BRPM | TEMPERATURE: 98 F | HEIGHT: 64 IN | WEIGHT: 139.99 LBS | DIASTOLIC BLOOD PRESSURE: 72 MMHG

## 2023-11-18 DIAGNOSIS — Z98.890 OTHER SPECIFIED POSTPROCEDURAL STATES: Chronic | ICD-10-CM

## 2023-11-18 PROCEDURE — 93971 EXTREMITY STUDY: CPT | Mod: 26,LT

## 2023-11-18 PROCEDURE — 73610 X-RAY EXAM OF ANKLE: CPT

## 2023-11-18 PROCEDURE — 99284 EMERGENCY DEPT VISIT MOD MDM: CPT

## 2023-11-18 PROCEDURE — 73610 X-RAY EXAM OF ANKLE: CPT | Mod: 26,LT

## 2023-11-18 PROCEDURE — 93971 EXTREMITY STUDY: CPT

## 2023-11-18 PROCEDURE — 99284 EMERGENCY DEPT VISIT MOD MDM: CPT | Mod: 25

## 2023-11-18 PROCEDURE — 96372 THER/PROPH/DIAG INJ SC/IM: CPT

## 2023-11-18 RX ORDER — KETOROLAC TROMETHAMINE 30 MG/ML
30 SYRINGE (ML) INJECTION ONCE
Refills: 0 | Status: DISCONTINUED | OUTPATIENT
Start: 2023-11-18 | End: 2023-11-18

## 2023-11-18 RX ADMIN — Medication 30 MILLIGRAM(S): at 15:34

## 2023-11-18 NOTE — ED ADULT NURSE NOTE - NSFALLASSESSNEED_ED_ALL_ED
Patient should continue with flonase nasal spray, twice daily. Use the flonase after the saline rinses.    Patient should use plain saline rinses twice daily without the added antibiotics and steroids. Patient can use saline spray as needed.    Blow nose gently.        
no

## 2023-11-18 NOTE — ED ADULT NURSE NOTE - NSFALLUNIVINTERV_ED_ALL_ED
Bed/Stretcher in lowest position, wheels locked, appropriate side rails in place/Call bell, personal items and telephone in reach/Instruct patient to call for assistance before getting out of bed/chair/stretcher/Non-slip footwear applied when patient is off stretcher/[place] to call system/Physically safe environment - no spills, clutter or unnecessary equipment/Purposeful proactive rounding/Room/bathroom lighting operational, light cord in reach

## 2023-11-18 NOTE — ED PROVIDER NOTE - CARE PROVIDER_API CALL
Luis Eduardo Echols  Orthopaedic Surgery  [address] [place] [id number]  Phone: [phone]  Fax: [phone]  Follow Up Time:

## 2023-11-18 NOTE — ED PROVIDER NOTE - CHIEF COMPLAINT
The patient is a 43y Female complaining of knee pain/injury. The patient is a 43y Female complaining of ankle pain/injury.

## 2023-11-18 NOTE — ED PROVIDER NOTE - NS ED ATTENDING STATEMENT MOD
impaired postural control This was a shared visit with the ROSHAN. I reviewed and verified the documentation and independently performed the documented:

## 2023-11-18 NOTE — ED ADULT NURSE NOTE - OBJECTIVE STATEMENT
Pt has c/o left lower leg pain for the past week. denies injury or trauma. reports going to PCP and was given presc for MRI but didn't grow out of it

## 2023-11-18 NOTE — ED PROVIDER NOTE - CLINICAL SUMMARY MEDICAL DECISION MAKING FREE TEXT BOX
43F h/o lymphoma presenting to the ED c/o left ankle pain x 2 days.  u/s negative for dvt. xrays reviewed- wnl. Pt stable for d/c with ortho f/u as needed.

## 2023-11-18 NOTE — ED PROVIDER NOTE - PATIENT PORTAL LINK FT
You can access the FollowMyHealth Patient Portal offered by [facility] by registering at the following website: [email or web address]. By joining [facility]’s FollowMyHealth portal, you will also be able to view your health information using other applications (apps) compatible with our system.

## 2023-11-18 NOTE — ED PROVIDER NOTE - OBJECTIVE STATEMENT
43F h/o lymphoma presenting to the ED c/o left ankle pain x 2 days. Pt otherwise denies recent trauma/injury, fever/chills, c/p, sob, abd pain, n/v/c/d, dysuria, numbness/tingling/weakness and has no other complaints at this time.

## 2023-12-14 ENCOUNTER — APPOINTMENT (OUTPATIENT)
Dept: CARDIOLOGY | Facility: CLINIC | Age: 43
End: 2023-12-14

## 2024-05-09 ENCOUNTER — EMERGENCY (EMERGENCY)
Facility: HOSPITAL | Age: 44
LOS: 1 days | Discharge: HOME | End: 2024-05-09
Attending: EMERGENCY MEDICINE
Payer: COMMERCIAL

## 2024-05-09 VITALS
RESPIRATION RATE: 17 BRPM | HEIGHT: 61 IN | TEMPERATURE: 98 F | DIASTOLIC BLOOD PRESSURE: 83 MMHG | WEIGHT: 147.27 LBS | OXYGEN SATURATION: 100 % | HEART RATE: 68 BPM | SYSTOLIC BLOOD PRESSURE: 120 MMHG

## 2024-05-09 DIAGNOSIS — Z98.890 OTHER SPECIFIED POSTPROCEDURAL STATES: Chronic | ICD-10-CM

## 2024-05-09 PROCEDURE — 99282 EMERGENCY DEPT VISIT SF MDM: CPT

## 2024-05-09 PROCEDURE — 99283 EMERGENCY DEPT VISIT LOW MDM: CPT

## 2024-05-09 RX ORDER — DIPHENHYDRAMINE HCL 50 MG
1 CAPSULE ORAL
Qty: 20 | Refills: 0
Start: 2024-05-09 | End: 2024-05-13

## 2024-05-09 RX ORDER — DIPHENHYDRAMINE HCL 50 MG
25 CAPSULE ORAL ONCE
Refills: 0 | Status: COMPLETED | OUTPATIENT
Start: 2024-05-09 | End: 2024-05-09

## 2024-05-09 RX ADMIN — Medication 25 MILLIGRAM(S): at 22:34

## 2024-05-09 NOTE — ED PROVIDER NOTE - ATTENDING APP SHARED VISIT CONTRIBUTION OF CARE
I, Afshin Stearns MD, performed the initial face to face bedside interview with this patient regarding history of present illness, review of symptoms and relevant past medical, social and family history.  I completed an independent physical examination.  I was the initial provider who evaluated this patient. I have signed out the follow up of any pending tests (i.e. labs, radiological studies) to the ACP.  I have communicated the patient’s plan of care and disposition with the ACP.

## 2024-05-09 NOTE — ED PROVIDER NOTE - CLINICAL SUMMARY MEDICAL DECISION MAKING FREE TEXT BOX
Patient with significant past medical history of asthma presents emergency department for evaluation of tongue and mouth itchiness status post starting any medication.  Patient states that she took first dose of montelukast earlier today to help control her asthma.  Patient states in the afternoon she developed mild itchiness of her tongue and mouth without shortness of breath difficulty breathing lip swelling cough wheeze.  Patient [place] fever chills headache dizziness rash nausea vomiting headache. Number on recall surgery patient no acute findings on exam no signs and symptoms of anaphylaxis and/or angioedema.  Patient instructed to stop montelukast at this time placed on Benadryl DC home with follow-up to prescribing physician supportive care otherwise.  Patient and family educated about when to return to the ED if needed. PT verbalizes that he understands all instructions and results. Pt infomred that ED is open and availible 24/7 365 days a yr, encouraged to return to the ED if they have any change in condition, or feel the need for revaluation.

## 2024-05-09 NOTE — ED PROVIDER NOTE - OBJECTIVE STATEMENT
Patient with significant past medical history of asthma presents emergency department for evaluation of tongue and mouth itchiness status post starting any medication.  Patient states that she took first dose of montelukast earlier today to help control her asthma.  Patient states in the afternoon she developed mild itchiness of her tongue and mouth without shortness of breath difficulty breathing lip swelling cough wheeze.  Patient [place] fever chills headache dizziness rash nausea vomiting headache.

## 2024-05-09 NOTE — ED PROVIDER NOTE - NSFOLLOWUPINSTRUCTIONS_ED_ALL_ED_FT
Patient education: Asthma in adults (The Basics)  Written by the doctors and editors at [facility]  Please read the Disclaimer at the end of this page.    What is asthma?  Asthma is a condition that can make it hard to breathe. Asthma symptoms can be mild or severe. They can come and go. An asthma "attack" is when symptoms start suddenly. This happens when the airways in the lungs become more narrow and inflamed (figure 1).    Asthma can run in families.    What are the symptoms of asthma?  Asthma symptoms can include:    ?Wheezing or noisy breathing    ?Coughing    ?Tight feeling in the chest    ?Shortness of breath    Symptoms can happen each day, each week, or less often. Symptoms can range from mild to severe. Although it is rare, an asthma attack can sometimes even lead to death.    Is there a test for asthma?  Yes. Your doctor will ask you about your symptoms and have you do a breathing test to check how your lungs are working.    If your doctor thinks that allergies might be making your asthma worse, they might suggest allergy testing. This can include skin tests or blood tests.    How is asthma treated?  Asthma is treated with different types of medicines. The medicines can be inhalers, liquids, or pills. Your doctor will prescribe medicine based on how often you have symptoms and how serious your symptoms are. There are 2 main types of asthma medicines:    ?Quick-relief medicines stop symptoms quickly, in 5 to 15 minutes. Almost everyone with asthma carries a quick-relief inhaler with them. People use these medicines whenever they have asthma symptoms. Most people need these medicines 1 or 2 times a week, or less often. But when asthma symptoms get worse, more doses might be needed.    ?Long-term controller medicines control asthma and help prevent future attacks. People who get asthma symptoms more than 2 times a week should use a controller medicine every day.    Some medicines can work as both a controller medicine and a quick-relief medicine. These are taken once or twice a day as controller medicines. They can also be used for quick relief.    It is very important to take all of the medicines the doctor prescribes, exactly how you are supposed to take them. You might have to take medicines a few times a day. Your doctor, nurse, or pharmacist will show you the right way to use your inhaler(s).    If your symptoms get much worse all of a sudden, use your quick-relief medicine and contact your doctor or nurse. You might need to go to the hospital for treatment.    What is an asthma action plan?  An asthma action plan is a list of instructions that tell you (form 1):    ?Which medicines to use each day at home    ?Which medicines to take if your symptoms get worse    ?When to get help or call for an ambulance    If you have frequent or severe asthma symptoms, your doctor might suggest that you have an asthma action plan. If so, you and your doctor will work together to make one. As part of your action plan, you might need to use something called a "peak flow meter." Breathing into this device will show how your lungs are working. Your doctor will show you the right way to use your peak flow meter.    Can asthma symptoms be prevented?  There are things that you can do to help prevent asthma attacks. Your doctor or nurse can talk to you about what is most important for you.    In general, you can:    ?Avoid "triggers" – These are things that make your symptoms worse. Common triggers include smoke, air pollution, dust, mold, pollen, strong chemicals or smells, and very cold or dry air. For some people, being around certain animals can trigger symptoms. Exercise and stress can also be triggers.    Some adults with asthma have worse symptoms if they take aspirin or medicines called NSAIDs. NSAIDs include ibuprofen (sample brand names: Advil, Motrin) and naproxen (sample brand names: Aleve, Naprosyn). Ask your doctor if you need to avoid these medicines.    If you can't avoid certain triggers, talk with your doctor about what you can do. For example, you might need to take an extra dose of your quick-relief inhaler medicine before you exercise or are around things you are allergic to.    ?Lower your risk of getting sick – Some infections can make asthma symptoms worse. These include the common cold, the flu, and coronavirus disease 2019 ("COVID-19").    It's important to get the COVID-19 vaccine. This will lower the risk of severe illness if you do get COVID-19. You should also get a flu shot every year. Plus, some people need to get a vaccine to help prevent pneumonia.    If you think that you might have an infection, tell your doctor or nurse. They can help you figure out if you need treatment.    ?Make sure that you know how and when to take your medicines – If you take controller medicines, follow all instructions to help prevent symptoms. You should also make sure that you know how and when to use your quick-relief medicine.    ?See your doctor or nurse regularly – If you need asthma medicine every day, you should see your doctor or nurse at least every 6 months. At these appointments, they will ask about your symptoms, check how well your lungs are working, and talk about your treatment plan.    What if I want to get pregnant?  If you want to get pregnant, talk to your doctor about how to control your asthma. Keeping your asthma well controlled is important for the health of your baby. Most asthma medicines are safe to take if you are pregnant.    When should I call the doctor?  Call for an ambulance (in the [place] and [place], call 9-1-1) if you have severe symptoms like:    ?You have so much trouble breathing that you cannot talk.    ?Your lips or fingernails turn gray or blue.    Call your doctor or nurse if:    ?You have an asthma attack and the symptoms do not improve, or get worse, after using a quick-relief medicine.    ?You need to use your quick-relief medicine more than 2 times a week.    ?You cannot do your normal activities because of your asthma symptoms.    ?You have any questions about your medicines.

## 2024-05-09 NOTE — ED PROVIDER NOTE - ADDITIONAL NOTES AND INSTRUCTIONS:
PT was evaluated At [facility] ED and was found to have a condition that warranted time of to rest and heal from WORK/SCHOOL.   Dell Lee PA-C

## 2024-05-09 NOTE — ED PROVIDER NOTE - PATIENT PORTAL LINK FT
You can access the FollowMyHealth Patient Portal offered by [facility] by registering at the following website: [email or web address]. By joining [employer]’s FollowMyHealth portal, you will also be able to view your health information using other applications (apps) compatible with our system.

## 2024-05-09 NOTE — ED ADULT TRIAGE NOTE - CHIEF COMPLAINT QUOTE
Patient states she feels like she was mouth inflammation after starting Singulair yesterday. Mouth examined, no swelling noted. No rash. No airway compromise noted. No signs of respiratory distress noted. No additional complaints @ this time.

## 2024-08-18 ENCOUNTER — NON-APPOINTMENT (OUTPATIENT)
Age: 44
End: 2024-08-18

## 2024-08-19 ENCOUNTER — APPOINTMENT (OUTPATIENT)
Dept: OBGYN | Facility: CLINIC | Age: 44
End: 2024-08-19
Payer: COMMERCIAL

## 2024-08-19 VITALS
HEIGHT: 60 IN | SYSTOLIC BLOOD PRESSURE: 120 MMHG | WEIGHT: 148 LBS | BODY MASS INDEX: 29.06 KG/M2 | DIASTOLIC BLOOD PRESSURE: 84 MMHG

## 2024-08-19 DIAGNOSIS — Z01.411 ENCOUNTER FOR GYNECOLOGICAL EXAMINATION (GENERAL) (ROUTINE) WITH ABNORMAL FINDINGS: ICD-10-CM

## 2024-08-19 DIAGNOSIS — Z01.419 ENCOUNTER FOR GYNECOLOGICAL EXAMINATION (GENERAL) (ROUTINE) W/OUT ABNORMAL FINDINGS: ICD-10-CM

## 2024-08-19 DIAGNOSIS — N94.6 DYSMENORRHEA, UNSPECIFIED: ICD-10-CM

## 2024-08-19 PROCEDURE — 99213 OFFICE O/P EST LOW 20 MIN: CPT | Mod: 25

## 2024-08-19 PROCEDURE — 99396 PREV VISIT EST AGE 40-64: CPT

## 2024-08-19 RX ORDER — PRENATAL WITH FERROUS FUM AND FOLIC ACID 3080; 920; 120; 400; 22; 1.84; 3; 20; 10; 1; 12; 200; 27; 25; 2 [IU]/1; [IU]/1; MG/1; [IU]/1; MG/1; MG/1; MG/1; MG/1; MG/1; MG/1; UG/1; MG/1; MG/1; MG/1; MG/1
27-1 TABLET ORAL
Qty: 90 | Refills: 3 | Status: ACTIVE | COMMUNITY
Start: 2024-08-19 | End: 1900-01-01

## 2024-08-19 RX ORDER — PNV NO.95/FERROUS FUM/FOLIC AC 28MG-0.8MG
28-0.8 TABLET ORAL DAILY
Qty: 90 | Refills: 3 | Status: ACTIVE | COMMUNITY
Start: 2024-08-19 | End: 1900-01-01

## 2024-08-19 NOTE — HISTORY OF PRESENT ILLNESS
[N] : Patient is not sexually active [Y] : Positive pregnancy history [FreeTextEntry1] : 44-year-old -0-1-2 last menstrual cycle was 2024 for 3 days presents for GYN evaluation.  She is sexually active and desires pregnancy.  No pain bleeding or discharge. [PGHxTotal] : 2 [[facility]] : 2 [PGHxPremature] : 0 [PGHxAbortions] : 0 [[facility]] : 2 [PGHxABInduced] : 0 [PGHxABSpont] : 0 [PGHxEctopic] : 0 [PGHxMultBirths] : 0

## 2024-08-19 NOTE — DISCUSSION/SUMMARY
[FreeTextEntry1] : 44-year-old -0-1-2 for GYN evaluation.  No pain bleeding or discharge.  Patient desires pregnancy and prenatal vitamins reordered.  Pap GC chlamydia sent and mammogram ordered.  Diet and exercise stressed.  Return in 6 months for follow-up.

## 2024-08-23 LAB — CYTOLOGY CVX/VAG DOC THIN PREP: NORMAL

## 2024-09-04 ENCOUNTER — NON-APPOINTMENT (OUTPATIENT)
Age: 44
End: 2024-09-04

## 2024-09-05 ENCOUNTER — APPOINTMENT (OUTPATIENT)
Dept: OBGYN | Facility: CLINIC | Age: 44
End: 2024-09-05
Payer: COMMERCIAL

## 2024-09-05 VITALS
SYSTOLIC BLOOD PRESSURE: 110 MMHG | WEIGHT: 152 LBS | BODY MASS INDEX: 29.84 KG/M2 | DIASTOLIC BLOOD PRESSURE: 70 MMHG | HEIGHT: 60 IN

## 2024-09-05 DIAGNOSIS — N91.1 SECONDARY AMENORRHEA: ICD-10-CM

## 2024-09-05 PROCEDURE — 99459 PELVIC EXAMINATION: CPT

## 2024-09-05 PROCEDURE — 81025 URINE PREGNANCY TEST: CPT

## 2024-09-05 PROCEDURE — 99213 OFFICE O/P EST LOW 20 MIN: CPT

## 2024-09-05 NOTE — HISTORY OF PRESENT ILLNESS
[N] : Patient does not use contraception [Y] : Positive pregnancy history [Regular Cycle Intervals] : periods have been regular [Frequency: Q ___ days] : menstrual periods occur approximately every [unfilled] days [Menarche Age: ____] : age at menarche was [unfilled] [FreeTextEntry1] : 44-year-old -0-1-2 last menstrual cycle was 2024 presents with positive pregnancy test.  Bedside sonogram showed questionable gestational sac in the uterus.  No pain nausea or vomiting. [PGHxTotal] : 4 [[facility]] : 2 [PGHxPremature] : 0 [PGHxAbortions] : 1 [[facility]] : 2 [PGHxABInduced] : 0 [PGHxABSpont] : 1 [PGHxEctopic] : 0 [PGHxMultBirths] : 0

## 2024-09-05 NOTE — PHYSICAL EXAM
[Chaperone Present] : A chaperone was present in the examining room during all aspects of the physical examination [54781] : A chaperone was present during the pelvic exam. [Appropriately responsive] : appropriately responsive [Alert] : alert [No Acute Distress] : no acute distress [No Lymphadenopathy] : no lymphadenopathy [Regular Rate Rhythm] : regular rate rhythm [No Murmurs] : no murmurs [Clear to Auscultation B/L] : clear to auscultation bilaterally [Soft] : soft [Non-tender] : non-tender [Non-distended] : non-distended [No HSM] : No HSM [No Lesions] : no lesions [No Mass] : no mass [Oriented x3] : oriented x3 [Examination Of The Breasts] : a normal appearance [No Masses] : no breast masses were palpable [Labia Majora] : normal [Labia Minora] : normal [Normal] : normal [Uterine Adnexae] : normal [Declined] : Patient declined rectal exam [FreeTextEntry2] : Noé

## 2024-09-05 NOTE — PHYSICAL EXAM
[Chaperone Present] : A chaperone was present in the examining room during all aspects of the physical examination [[id number]] : A chaperone was present during the pelvic exam. [Appropriately responsive] : appropriately responsive [Alert] : alert [No Acute Distress] : no acute distress [No Lymphadenopathy] : no lymphadenopathy [Regular Rate Rhythm] : regular rate rhythm [No Murmurs] : no murmurs [Clear to Auscultation B/L] : clear to auscultation bilaterally [Soft] : soft [Non-tender] : non-tender [Non-distended] : non-distended [No HSM] : No HSM [No Lesions] : no lesions [No Mass] : no mass [Oriented x3] : oriented x3 [Examination Of The Breasts] : a normal appearance [No Masses] : no breast masses were palpable [Labia Majora] : normal [Labia Minora] : normal [Normal] : normal [Uterine Adnexae] : normal [Declined] : Patient declined rectal exam [FreeTextEntry2] : Noé

## 2024-09-05 NOTE — DISCUSSION/SUMMARY
[FreeTextEntry1] : 44-year-old -0-1-2 last menstrual cycle was 2024 presents with positive pregnancy test and bedside sonogram shows questionable intrauterine gestational sac.  No pain or bleeding.  Physical exam is normal.  Quantitative beta-hCG ordered.  Prenatal vitamins prescribed.  Return in 2 weeks for follow-up.  Should bleeding or pain occur, come to the emergency room immediately.

## 2024-09-05 NOTE — HISTORY OF PRESENT ILLNESS
[N] : Patient does not use contraception [Y] : Positive pregnancy history [Regular Cycle Intervals] : periods have been regular [Frequency: Q ___ days] : menstrual periods occur approximately every [unfilled] days [Menarche Age: ____] : age at menarche was [unfilled] [FreeTextEntry1] : 44-year-old -0-1-2 last menstrual cycle was 2024 presents with positive pregnancy test.  Bedside sonogram showed questionable gestational sac in the uterus.  No pain nausea or vomiting. [PGHxTotal] : 4 [[facility]] : 2 [PGHxPremature] : 0 [PGHxAbortions] : 1 [[facility]] : 2 [PGHxABInduced] : 0 [PGHxABSpont] : 1 [PGHxEctopic] : 0 [PGHxMultBirths] : 0

## 2024-09-06 LAB
HCG SERPL QL: POSITIVE
HCG UR QL: POSITIVE
PAPP-A SERPL-ACNC: 84 MIU/ML
QUALITY CONTROL: YES

## 2024-09-11 ENCOUNTER — APPOINTMENT (OUTPATIENT)
Dept: OBGYN | Facility: CLINIC | Age: 44
End: 2024-09-11
Payer: COMMERCIAL

## 2024-09-11 VITALS
HEIGHT: 60 IN | BODY MASS INDEX: 29.88 KG/M2 | SYSTOLIC BLOOD PRESSURE: 126 MMHG | DIASTOLIC BLOOD PRESSURE: 84 MMHG | WEIGHT: 152.2 LBS

## 2024-09-11 PROBLEM — O20.0 MISCARRIAGE, THREATENED, EARLY PREGNANCY: Status: ACTIVE | Noted: 2024-09-11

## 2024-09-11 PROCEDURE — 99459 PELVIC EXAMINATION: CPT

## 2024-09-11 PROCEDURE — 99214 OFFICE O/P EST MOD 30 MIN: CPT

## 2024-09-11 NOTE — DISCUSSION/SUMMARY
[FreeTextEntry1] : 44-year-old -0-1-2 presents with positive pregnancy test and vaginal bleeding now begins to have some cramps.  Sonogram failed to show anything in the uterus or in the adnexa.  Quantitative beta-hCG ordered.  Patient is Rh+.  Return in 1 week for follow-up and should pain or bleeding occur come to the ED for evaluation.

## 2024-09-11 NOTE — PHYSICAL EXAM
[Chaperone Present] : A chaperone was present in the examining room during all aspects of the physical examination [[id number]] : A chaperone was present during the pelvic exam. [Labia Majora] : normal [Labia Minora] : normal [Moderate] : There was moderate vaginal bleeding [Normal] : normal [Uterine Adnexae] : normal [Declined] : Patient declined rectal exam [FreeTextEntry2] : lyndon

## 2024-09-11 NOTE — HISTORY OF PRESENT ILLNESS
[FreeTextEntry1] : 44-year-old -0-1-2 last menstrual cycle was 2024 presents with vaginal bleeding.  Denies pelvic pain or shoulder pain.  Quantitative beta-hCG of 2024 was 84.  Bedside sonogram showed nothing in the uterus and nothing in the adnexa and some blood clot per vagina.

## 2024-09-12 LAB
HCG SERPL QL: POSITIVE
PAPP-A SERPL-ACNC: 74 MIU/ML

## 2024-09-16 ENCOUNTER — APPOINTMENT (OUTPATIENT)
Dept: OBGYN | Facility: CLINIC | Age: 44
End: 2024-09-16
Payer: COMMERCIAL

## 2024-09-16 VITALS
WEIGHT: 151 LBS | HEIGHT: 60 IN | BODY MASS INDEX: 29.64 KG/M2 | DIASTOLIC BLOOD PRESSURE: 72 MMHG | SYSTOLIC BLOOD PRESSURE: 112 MMHG

## 2024-09-16 DIAGNOSIS — N91.1 SECONDARY AMENORRHEA: ICD-10-CM

## 2024-09-16 DIAGNOSIS — O20.0 THREATENED ABORTION: ICD-10-CM

## 2024-09-16 PROCEDURE — 99213 OFFICE O/P EST LOW 20 MIN: CPT

## 2024-09-16 NOTE — DISCUSSION/SUMMARY
[FreeTextEntry1] : 44-year-old -0-2-2 with vaginal bleeding perhaps indicative of spontaneous miscarriage.  Quantitative beta-hCG is dropping.  Repeat study ordered.  Return in 4 weeks for follow-up.

## 2024-09-16 NOTE — HISTORY OF PRESENT ILLNESS
[FreeTextEntry1] : 44-year-old -0-2-2 presents for follow-up care.  Quantitative beta-hCG has been dropping which is indicative of miscarriage and she is no longer bleeding.  Quantitative beta-hCG to be reordered.

## 2024-09-17 LAB
HCG SERPL QL: NEGATIVE
PAPP-A SERPL-ACNC: 3 MIU/ML

## 2024-10-21 ENCOUNTER — APPOINTMENT (OUTPATIENT)
Dept: OBGYN | Facility: CLINIC | Age: 44
End: 2024-10-21
Payer: COMMERCIAL

## 2024-10-21 VITALS
WEIGHT: 147 LBS | HEIGHT: 60 IN | BODY MASS INDEX: 28.86 KG/M2 | DIASTOLIC BLOOD PRESSURE: 70 MMHG | SYSTOLIC BLOOD PRESSURE: 110 MMHG

## 2024-10-21 DIAGNOSIS — O20.0 THREATENED ABORTION: ICD-10-CM

## 2024-10-21 DIAGNOSIS — Z01.419 ENCOUNTER FOR GYNECOLOGICAL EXAMINATION (GENERAL) (ROUTINE) W/OUT ABNORMAL FINDINGS: ICD-10-CM

## 2024-10-21 LAB
APPEARANCE: CLEAR
BILIRUBIN URINE: NEGATIVE
BLOOD URINE: ABNORMAL
COLOR: YELLOW
GLUCOSE QUALITATIVE U: NEGATIVE
KETONES URINE: NEGATIVE
LEUKOCYTE ESTERASE URINE: ABNORMAL
NITRITE URINE: NEGATIVE
PH URINE: 7.5
PROTEIN URINE: NEGATIVE
SPECIFIC GRAVITY URINE: 1.01
UROBILINOGEN URINE: 0.2 (ref 0.2–?)

## 2024-10-21 PROCEDURE — 99214 OFFICE O/P EST MOD 30 MIN: CPT

## 2024-10-28 ENCOUNTER — APPOINTMENT (OUTPATIENT)
Dept: OBGYN | Facility: CLINIC | Age: 44
End: 2024-10-28

## 2024-11-12 NOTE — ED ADULT NURSE NOTE - NSFALLUNIVINTERV_ED_ALL_ED
Bed/Stretcher in lowest position, wheels locked, appropriate side rails in place/Call bell, personal items and telephone in reach/Instruct patient to call for assistance before getting out of bed/chair/stretcher/Non-slip footwear applied when patient is off stretcher/[place] to call system/Physically safe environment - no spills, clutter or unnecessary equipment/Purposeful proactive rounding/Room/bathroom lighting operational, light cord in reach
no

## 2025-01-03 NOTE — ED PROVIDER NOTE - BIRTH SEX
Medication: glipiZIDE (GLUCOTROL XL) 5 MG 24 hr tablet  passed protocol.   Last office visit date: 10/15/2024  Next appointment scheduled?: Yes   Number of refills given: 90 day, R1    
Female

## 2025-02-17 ENCOUNTER — OFFICE (OUTPATIENT)
Dept: URBAN - METROPOLITAN AREA CLINIC 116 | Facility: CLINIC | Age: 45
Setting detail: OPHTHALMOLOGY
End: 2025-02-17
Payer: COMMERCIAL

## 2025-02-17 DIAGNOSIS — H10.432: ICD-10-CM

## 2025-02-17 PROCEDURE — 92004 COMPRE OPH EXAM NEW PT 1/>: CPT | Performed by: OPTOMETRIST

## 2025-02-17 ASSESSMENT — REFRACTION_MANIFEST
OD_AXIS: 100
OD_AXIS: 100
OS_VA1: 20/25--
OS_SPHERE: PLANO
OD_CYLINDER: -1.25
OD_CYLINDER: -1.25
OD_VA1: 20/25
OD_VA1: 20/25
OS_AXIS: 060
OS_AXIS: 060
OD_AXIS: 100
OD_CYLINDER: -1.25
OS_CYLINDER: -1.75
OD_SPHERE: PLANO
OS_AXIS: 060
OD_VA1: 20/20
OS_CYLINDER: -2.00
OS_CYLINDER: -1.25
OD_SPHERE: PLANO
OS_CYLINDER: -1.75
OD_SPHERE: PLANO
OS_SPHERE: PLANO
OS_AXIS: 060
OD_SPHERE: PLANO
OS_VA1: 20/20
OS_VA1: 20/25-
OD_CYLINDER: -1.25
OS_VA1: 20/25
OD_VA1: 20/25
OS_SPHERE: PLANO
OD_AXIS: 105
OS_SPHERE: +0.50

## 2025-02-17 ASSESSMENT — REFRACTION_AUTOREFRACTION
OD_AXIS: 099
OS_AXIS: 056
OS_CYLINDER: -2.50
OD_SPHERE: 0.00
OS_SPHERE: +0.25
OD_CYLINDER: -1.50

## 2025-02-17 ASSESSMENT — TONOMETRY
OD_IOP_MMHG: 15
OS_IOP_MMHG: 17

## 2025-02-17 ASSESSMENT — CONFRONTATIONAL VISUAL FIELD TEST (CVF)
OS_FINDINGS: FULL
OD_FINDINGS: FULL

## 2025-02-17 ASSESSMENT — KERATOMETRY
OS_K2POWER_DIOPTERS: 47.75
OD_AXISANGLE_DEGREES: 029
OD_K2POWER_DIOPTERS: 47.00
OD_K1POWER_DIOPTERS: 46.25
OS_K1POWER_DIOPTERS: 46.25
OS_AXISANGLE_DEGREES: 127

## 2025-02-17 ASSESSMENT — SUPERFICIAL PUNCTATE KERATITIS (SPK)
OS_SPK: 2+ 3+
OD_SPK: T

## 2025-02-17 ASSESSMENT — REFRACTION_CURRENTRX
OS_VPRISM_DIRECTION: SV
OS_SPHERE: PLANO
OD_SPHERE: PLANO
OD_OVR_VA: 20/
OD_CYLINDER: -1.25
OS_CYLINDER: -1.75
OD_VPRISM_DIRECTION: SV
OS_AXIS: 060
OD_AXIS: 100
OS_OVR_VA: 20/

## 2025-02-17 ASSESSMENT — VISUAL ACUITY
OD_BCVA: 20/80
OS_BCVA: 20/70

## 2025-03-10 NOTE — ED PROVIDER NOTE - CROS ED CARDIOVAS ALL NEG
No protocol for requested medication.    Medication: tiZANidine (ZANAFLEX) 4 MG tablet   Last office visit date: 02/28/25  Pharmacy: [facility]/PHARMACY #[id number] - [address]  Order pended, routed to clinician for review.     Pt got rx this medication on 02/28/28, #15 tab   Now pt is asking for 90 day supply   
negative...

## 2025-04-21 ENCOUNTER — APPOINTMENT (OUTPATIENT)
Dept: OBGYN | Facility: CLINIC | Age: 45
End: 2025-04-21
Payer: COMMERCIAL

## 2025-04-21 ENCOUNTER — NON-APPOINTMENT (OUTPATIENT)
Age: 45
End: 2025-04-21

## 2025-04-21 VITALS
SYSTOLIC BLOOD PRESSURE: 120 MMHG | HEIGHT: 60 IN | DIASTOLIC BLOOD PRESSURE: 84 MMHG | WEIGHT: 154.1 LBS | BODY MASS INDEX: 30.25 KG/M2

## 2025-04-21 DIAGNOSIS — N94.6 DYSMENORRHEA, UNSPECIFIED: ICD-10-CM

## 2025-04-21 DIAGNOSIS — Z01.419 ENCOUNTER FOR GYNECOLOGICAL EXAMINATION (GENERAL) (ROUTINE) W/OUT ABNORMAL FINDINGS: ICD-10-CM

## 2025-04-21 PROCEDURE — 99214 OFFICE O/P EST MOD 30 MIN: CPT

## 2025-04-21 RX ORDER — PNV NO.95/FERROUS FUM/FOLIC AC 28MG-0.8MG
28-0.8 TABLET ORAL DAILY
Qty: 90 | Refills: 3 | Status: ACTIVE | COMMUNITY
Start: 2025-04-21 | End: 1900-01-01

## 2025-04-30 ENCOUNTER — EMERGENCY (EMERGENCY)
Facility: HOSPITAL | Age: 45
LOS: 1 days | End: 2025-04-30
Attending: EMERGENCY MEDICINE
Payer: COMMERCIAL

## 2025-04-30 VITALS
TEMPERATURE: 98 F | HEART RATE: 78 BPM | SYSTOLIC BLOOD PRESSURE: 153 MMHG | WEIGHT: 155.43 LBS | OXYGEN SATURATION: 100 % | RESPIRATION RATE: 18 BRPM | DIASTOLIC BLOOD PRESSURE: 87 MMHG

## 2025-04-30 DIAGNOSIS — Z98.890 OTHER SPECIFIED POSTPROCEDURAL STATES: Chronic | ICD-10-CM

## 2025-04-30 LAB
ALBUMIN SERPL ELPH-MCNC: 4.1 G/DL — SIGNIFICANT CHANGE UP (ref 3.3–5.2)
ALP SERPL-CCNC: 57 U/L — SIGNIFICANT CHANGE UP (ref 40–120)
ALT FLD-CCNC: 16 U/L — SIGNIFICANT CHANGE UP
ANION GAP SERPL CALC-SCNC: 13 MMOL/L — SIGNIFICANT CHANGE UP (ref 5–17)
AST SERPL-CCNC: 25 U/L — SIGNIFICANT CHANGE UP
BASOPHILS # BLD AUTO: 0.06 K/UL — SIGNIFICANT CHANGE UP (ref 0–0.2)
BASOPHILS NFR BLD AUTO: 0.7 % — SIGNIFICANT CHANGE UP (ref 0–2)
BILIRUB SERPL-MCNC: 0.3 MG/DL — LOW (ref 0.4–2)
BUN SERPL-MCNC: 15.7 MG/DL — SIGNIFICANT CHANGE UP (ref 8–20)
CALCIUM SERPL-MCNC: 9.1 MG/DL — SIGNIFICANT CHANGE UP (ref 8.4–10.5)
CHLORIDE SERPL-SCNC: 102 MMOL/L — SIGNIFICANT CHANGE UP (ref 96–108)
CO2 SERPL-SCNC: 24 MMOL/L — SIGNIFICANT CHANGE UP (ref 22–29)
CREAT SERPL-MCNC: 0.62 MG/DL — SIGNIFICANT CHANGE UP (ref 0.5–1.3)
EGFR: 113 ML/MIN/1.73M2 — SIGNIFICANT CHANGE UP
EGFR: 113 ML/MIN/1.73M2 — SIGNIFICANT CHANGE UP
EOSINOPHIL # BLD AUTO: 0.3 K/UL — SIGNIFICANT CHANGE UP (ref 0–0.5)
EOSINOPHIL NFR BLD AUTO: 3.7 % — SIGNIFICANT CHANGE UP (ref 0–6)
GLUCOSE SERPL-MCNC: 102 MG/DL — HIGH (ref 70–99)
HCG SERPL-ACNC: <4 MIU/ML — SIGNIFICANT CHANGE UP
HCT VFR BLD CALC: 39.3 % — SIGNIFICANT CHANGE UP (ref 34.5–45)
HGB BLD-MCNC: 13.9 G/DL — SIGNIFICANT CHANGE UP (ref 11.5–15.5)
IMM GRANULOCYTES # BLD AUTO: 0.02 K/UL — SIGNIFICANT CHANGE UP (ref 0–0.07)
IMM GRANULOCYTES NFR BLD AUTO: 0.2 % — SIGNIFICANT CHANGE UP (ref 0–0.9)
LYMPHOCYTES # BLD AUTO: 2.87 K/UL — SIGNIFICANT CHANGE UP (ref 1–3.3)
LYMPHOCYTES NFR BLD AUTO: 35.5 % — SIGNIFICANT CHANGE UP (ref 13–44)
MCHC RBC-ENTMCNC: 31.9 PG — SIGNIFICANT CHANGE UP (ref 27–34)
MCHC RBC-ENTMCNC: 35.4 G/DL — SIGNIFICANT CHANGE UP (ref 32–36)
MCV RBC AUTO: 90.1 FL — SIGNIFICANT CHANGE UP (ref 80–100)
MONOCYTES # BLD AUTO: 0.48 K/UL — SIGNIFICANT CHANGE UP (ref 0–0.9)
MONOCYTES NFR BLD AUTO: 5.9 % — SIGNIFICANT CHANGE UP (ref 2–14)
NEUTROPHILS # BLD AUTO: 4.35 K/UL — SIGNIFICANT CHANGE UP (ref 1.8–7.4)
NEUTROPHILS NFR BLD AUTO: 54 % — SIGNIFICANT CHANGE UP (ref 43–77)
NRBC # BLD AUTO: 0 K/UL — SIGNIFICANT CHANGE UP (ref 0–0)
NRBC # FLD: 0 K/UL — SIGNIFICANT CHANGE UP (ref 0–0)
NRBC BLD AUTO-RTO: 0 /100 WBCS — SIGNIFICANT CHANGE UP (ref 0–0)
PLATELET # BLD AUTO: 262 K/UL — SIGNIFICANT CHANGE UP (ref 150–400)
PMV BLD: 10.4 FL — SIGNIFICANT CHANGE UP (ref 7–13)
POTASSIUM SERPL-MCNC: 4.5 MMOL/L — SIGNIFICANT CHANGE UP (ref 3.5–5.3)
POTASSIUM SERPL-SCNC: 4.5 MMOL/L — SIGNIFICANT CHANGE UP (ref 3.5–5.3)
PROT SERPL-MCNC: 7 G/DL — SIGNIFICANT CHANGE UP (ref 6.6–8.7)
RBC # BLD: 4.36 M/UL — SIGNIFICANT CHANGE UP (ref 3.8–5.2)
RBC # FLD: 12.8 % — SIGNIFICANT CHANGE UP (ref 10.3–14.5)
SODIUM SERPL-SCNC: 139 MMOL/L — SIGNIFICANT CHANGE UP (ref 135–145)
WBC # BLD: 8.08 K/UL — SIGNIFICANT CHANGE UP (ref 3.8–10.5)
WBC # FLD AUTO: 8.08 K/UL — SIGNIFICANT CHANGE UP (ref 3.8–10.5)

## 2025-04-30 PROCEDURE — 72148 MRI LUMBAR SPINE W/O DYE: CPT | Mod: MC

## 2025-04-30 PROCEDURE — 72148 MRI LUMBAR SPINE W/O DYE: CPT | Mod: 26

## 2025-04-30 PROCEDURE — 96374 THER/PROPH/DIAG INJ IV PUSH: CPT

## 2025-04-30 PROCEDURE — 85025 COMPLETE CBC W/AUTO DIFF WBC: CPT

## 2025-04-30 PROCEDURE — 99285 EMERGENCY DEPT VISIT HI MDM: CPT

## 2025-04-30 PROCEDURE — 84702 CHORIONIC GONADOTROPIN TEST: CPT

## 2025-04-30 PROCEDURE — 36415 COLL VENOUS BLD VENIPUNCTURE: CPT

## 2025-04-30 PROCEDURE — 99284 EMERGENCY DEPT VISIT MOD MDM: CPT | Mod: 25

## 2025-04-30 PROCEDURE — 80053 COMPREHEN METABOLIC PANEL: CPT

## 2025-04-30 RX ORDER — ACETAMINOPHEN 500 MG/5ML
1000 LIQUID (ML) ORAL ONCE
Refills: 0 | Status: COMPLETED | OUTPATIENT
Start: 2025-04-30 | End: 2025-04-30

## 2025-04-30 RX ORDER — LIDOCAINE HYDROCHLORIDE 20 MG/ML
1 JELLY TOPICAL ONCE
Refills: 0 | Status: COMPLETED | OUTPATIENT
Start: 2025-04-30 | End: 2025-04-30

## 2025-04-30 RX ADMIN — LIDOCAINE HYDROCHLORIDE 1 PATCH: 20 JELLY TOPICAL at 10:00

## 2025-04-30 RX ADMIN — Medication 400 MILLIGRAM(S): at 10:01

## 2025-04-30 NOTE — ED PROVIDER NOTE - ATTENDING APP SHARED VISIT CONTRIBUTION OF CARE
45 year old female here c/o lower back pain x 2 weeks with radiation down left leg.  She reports her leg at times feels asleep.  She went to [facility] MD given pain medication with no relief.  She notes h/o asthma, lymphoma  [facility] states is being watched. She notes no fevers, no trauma or falls, no h/o fever or IVDA. no urinary or fecal incontinence. no saddle anesthesia.   Denies f/c/n/v/cp/sob/palpitations/ cough/rash/headache/dizziness/abd.pain/d/c/dysuria/hematuria    Head: atraumatic, normacephalic  Face: atraumatic, no crepitus no orbiral/maxillary/mandibular ttp  throat: uvula midline no exudates  eyes: perrla eomi  heart: rrr s1s2  lungs: ctab  abd: soft, nt nd +bs no rebound/guarding no cva ttp  skin: warm  LE: no swelling, no calf ttp  back: no midline cervical/thoracic + midline lumbar ttp and left buttocks ttp  normal gait neurovasc intact    --msk vs sciatica; however given pts hx of lymphoma will check mr lumbar pain control reassess

## 2025-04-30 NOTE — ED PROVIDER NOTE - PROGRESS NOTE DETAILS
spoke to radiologist Adarsh, advises best diagnostic test for patient is an MRI patient re-assessed feeling better, MRI reviewed with patient ZAHRA, advised outpatient f/u with spine

## 2025-04-30 NOTE — ED PROVIDER NOTE - CARE PROVIDER_API CALL
Amaya Browning  Neurosurgery  [address] [place] [id number]  Phone: [phone]  Fax: [phone]  Follow Up Time:

## 2025-04-30 NOTE — ED ADULT TRIAGE NOTE - CHIEF COMPLAINT QUOTE
Pt presents to ED c/o lower back pain. Pt denies injury, states the pain began two weeks ago. Saw a doctor and was prescribed an anti-inflammatory without relief.

## 2025-04-30 NOTE — ED PROVIDER NOTE - PATIENT PORTAL LINK FT
You can access the FollowMyHealth Patient Portal offered by [facility] by registering at the following website: [email or web address]. By joining [employer]’s FollowMyHealth portal, you will also be able to view your health information using other applications (apps) compatible with our system.

## 2025-04-30 NOTE — ED ADULT NURSE NOTE - OBJECTIVE STATEMENT
Assumed care of pt at 0817 in . Pt A&Ox4 c/o lower back pain .Pt denies CP and SOB RR Even and unlabored

## 2025-04-30 NOTE — ED PROVIDER NOTE - PHYSICAL EXAMINATION
Constitutional - well-developed; well nourished. Head - NCAT. Airway patent. Neuro - A&Ox3. strength 5/5 x4. sensation intact x4. normal gait. Skin - No rash. MSK - +midline lower back tenderness, no step offs no skin changes, able to straight leg raise and walk, limited ROM secondary to pain.

## 2025-04-30 NOTE — ED PROVIDER NOTE - OBJECTIVE STATEMENT
This is a 45 year old female here c/o lower back pain x 2 weeks with radiation down left leg.  She reports her leg at times feels asleep.  She went to [facility] MD given pain medication with no relief.  She notes h/o asthma, [facility] on [facility] states is being watched. She notes no fevers, no trauma or falls, no h/o fever or IVDA.

## 2025-04-30 NOTE — ED ADULT NURSE NOTE - NS ED NURSE IV DC DT
7/1/2023  ILauren DO, saw and evaluated the patient. I have discussed the patient with the resident/non-physician practitioner and agree with the resident's/non-physician practitioner's findings, Plan of Care, and MDM as documented in the resident's/non-physician practitioner's note, except where noted. All available labs and Radiology studies were reviewed. I was present for key portions of any procedure(s) performed by the resident/non-physician practitioner and I was immediately available to provide assistance. At this point I agree with the current assessment done in the Emergency Department. I have conducted an independent evaluation of this patient a history and physical is as follows:    Patient had been experiencing symptoms since concussion earlier in the year. Apparent orthostatic hypotension in chart note to PCP, was started on salt tab daily and tilt table test ordered. Pt underwent MRI brain on 6/29/23, not resulted yet. Had echo in 1/2023.    18yo presenting for headaches, started on Thursday with menstrual cycle, frontal forehead without radiation. CoQ10, salt, vitamins, as prescribed by PCP for orthostasis; no pain meds at home. Patient does not want needlestick, IM. No red flags, will provide oral migraine cocktail. Physical Exam  Vitals reviewed. Constitutional:       General: She is not in acute distress. Appearance: Normal appearance. She is not toxic-appearing or diaphoretic. HENT:      Head: Normocephalic and atraumatic. Right Ear: External ear normal.      Left Ear: External ear normal.      Nose: Nose normal.   Eyes:      General:         Right eye: No discharge. Left eye: No discharge. Extraocular Movements: Extraocular movements intact. Cardiovascular:      Rate and Rhythm: Normal rate. Pulmonary:      Effort: Pulmonary effort is normal. No respiratory distress. Musculoskeletal:         General: No deformity or signs of injury. Right lower leg: No edema. Left lower leg: No edema. Skin:     General: Skin is warm. Coloration: Skin is not jaundiced or pale. Neurological:      General: No focal deficit present. Mental Status: She is alert.       Gait: Gait normal.             ED Course         Critical Care Time  Procedures 30-Apr-2025 13:28

## 2025-04-30 NOTE — ED PROVIDER NOTE - CLINICAL SUMMARY MEDICAL DECISION MAKING FREE TEXT BOX
back pain with h/o lymphoma radiation down left leg with paresthesias intermittently, able to walk in ED  midline on exam  check CT  check labs, pain control, re-assess

## 2025-05-02 ENCOUNTER — APPOINTMENT (OUTPATIENT)
Age: 45
End: 2025-05-02
Payer: COMMERCIAL

## 2025-05-02 VITALS
HEIGHT: 64 IN | DIASTOLIC BLOOD PRESSURE: 89 MMHG | SYSTOLIC BLOOD PRESSURE: 132 MMHG | WEIGHT: 150 LBS | HEART RATE: 82 BPM | BODY MASS INDEX: 25.61 KG/M2

## 2025-05-02 DIAGNOSIS — R20.0 ANESTHESIA OF SKIN: ICD-10-CM

## 2025-05-02 DIAGNOSIS — M47.816 SPONDYLOSIS W/OUT MYELOPATHY OR RADICULOPATHY, LUMBAR REGION: ICD-10-CM

## 2025-05-02 PROCEDURE — 72100 X-RAY EXAM L-S SPINE 2/3 VWS: CPT

## 2025-05-02 PROCEDURE — 99204 OFFICE O/P NEW MOD 45 MIN: CPT

## 2025-05-02 RX ORDER — METHYLPREDNISOLONE 4 MG/1
4 TABLET ORAL
Qty: 1 | Refills: 0 | Status: ACTIVE | COMMUNITY
Start: 2025-05-02 | End: 1900-01-01

## 2025-05-02 RX ORDER — MELOXICAM 15 MG/1
15 TABLET ORAL
Qty: 30 | Refills: 0 | Status: ACTIVE | COMMUNITY
Start: 2025-05-02 | End: 1900-01-01

## 2025-05-03 DIAGNOSIS — M54.50 LOW BACK PAIN, UNSPECIFIED: ICD-10-CM

## 2025-05-03 DIAGNOSIS — J45.909 UNSPECIFIED ASTHMA, UNCOMPLICATED: ICD-10-CM

## 2025-05-03 DIAGNOSIS — Z85.79 PERSONAL HISTORY OF OTHER MALIGNANT NEOPLASMS OF LYMPHOID, HEMATOPOIETIC AND RELATED TISSUES: ICD-10-CM

## 2025-05-03 DIAGNOSIS — Z88.0 ALLERGY STATUS TO PENICILLIN: ICD-10-CM

## 2025-05-03 DIAGNOSIS — Z88.8 ALLERGY STATUS TO OTHER DRUGS, MEDICAMENTS AND BIOLOGICAL SUBSTANCES: ICD-10-CM

## 2025-06-13 ENCOUNTER — APPOINTMENT (OUTPATIENT)
Dept: ORTHOPEDIC SURGERY | Facility: CLINIC | Age: 45
End: 2025-06-13

## 2025-07-22 ENCOUNTER — APPOINTMENT (OUTPATIENT)
Dept: OBGYN | Facility: CLINIC | Age: 45
End: 2025-07-22